# Patient Record
Sex: FEMALE | Race: WHITE | Employment: OTHER | URBAN - METROPOLITAN AREA
[De-identification: names, ages, dates, MRNs, and addresses within clinical notes are randomized per-mention and may not be internally consistent; named-entity substitution may affect disease eponyms.]

---

## 2017-05-01 ENCOUNTER — OFFICE VISIT (OUTPATIENT)
Dept: PODIATRY | Age: 52
End: 2017-05-01
Payer: COMMERCIAL

## 2017-05-01 VITALS
SYSTOLIC BLOOD PRESSURE: 152 MMHG | DIASTOLIC BLOOD PRESSURE: 80 MMHG | WEIGHT: 293 LBS | HEART RATE: 86 BPM | HEIGHT: 63 IN | BODY MASS INDEX: 51.91 KG/M2

## 2017-05-01 DIAGNOSIS — M79.674 PAIN IN TOES OF BOTH FEET: ICD-10-CM

## 2017-05-01 DIAGNOSIS — M79.671 RIGHT FOOT PAIN: ICD-10-CM

## 2017-05-01 DIAGNOSIS — B35.3 TINEA PEDIS OF BOTH FEET: ICD-10-CM

## 2017-05-01 DIAGNOSIS — L85.3 XEROSIS OF SKIN: ICD-10-CM

## 2017-05-01 DIAGNOSIS — M79.675 PAIN IN TOES OF BOTH FEET: ICD-10-CM

## 2017-05-01 DIAGNOSIS — B35.1 ONYCHOMYCOSIS OF TOENAIL: Primary | ICD-10-CM

## 2017-05-01 PROCEDURE — 1036F TOBACCO NON-USER: CPT | Performed by: PODIATRIST

## 2017-05-01 PROCEDURE — G8417 CALC BMI ABV UP PARAM F/U: HCPCS | Performed by: PODIATRIST

## 2017-05-01 PROCEDURE — 3014F SCREEN MAMMO DOC REV: CPT | Performed by: PODIATRIST

## 2017-05-01 PROCEDURE — G8427 DOCREV CUR MEDS BY ELIG CLIN: HCPCS | Performed by: PODIATRIST

## 2017-05-01 PROCEDURE — 99203 OFFICE O/P NEW LOW 30 MIN: CPT | Performed by: PODIATRIST

## 2017-05-01 RX ORDER — CICLOPIROX 7.7 MG/G
GEL TOPICAL
Qty: 1 TUBE | Refills: 0 | Status: SHIPPED | OUTPATIENT
Start: 2017-05-01 | End: 2018-09-13 | Stop reason: ALTCHOICE

## 2017-05-01 RX ORDER — DIAPER,BRIEF,INFANT-TODD,DISP
EACH MISCELLANEOUS
Qty: 1 TUBE | Refills: 1 | Status: SHIPPED | OUTPATIENT
Start: 2017-05-01 | End: 2017-05-08

## 2017-05-01 ASSESSMENT — ENCOUNTER SYMPTOMS
COLOR CHANGE: 0
SHORTNESS OF BREATH: 0
NAUSEA: 0
DIARRHEA: 0
BACK PAIN: 0

## 2017-05-08 ENCOUNTER — CLINICAL DOCUMENTATION (OUTPATIENT)
Dept: PODIATRY | Age: 52
End: 2017-05-08

## 2018-04-23 ENCOUNTER — TELEPHONE (OUTPATIENT)
Dept: BARIATRICS/WEIGHT MGMT | Age: 53
End: 2018-04-23

## 2018-09-13 ENCOUNTER — OFFICE VISIT (OUTPATIENT)
Dept: FAMILY MEDICINE CLINIC | Age: 53
End: 2018-09-13
Payer: COMMERCIAL

## 2018-09-13 VITALS
TEMPERATURE: 97.9 F | DIASTOLIC BLOOD PRESSURE: 90 MMHG | SYSTOLIC BLOOD PRESSURE: 122 MMHG | RESPIRATION RATE: 16 BRPM | OXYGEN SATURATION: 96 % | BODY MASS INDEX: 48.82 KG/M2 | HEIGHT: 65 IN | HEART RATE: 109 BPM | WEIGHT: 293 LBS

## 2018-09-13 DIAGNOSIS — J01.90 ACUTE SINUSITIS, RECURRENCE NOT SPECIFIED, UNSPECIFIED LOCATION: Primary | ICD-10-CM

## 2018-09-13 DIAGNOSIS — J20.9 ACUTE BRONCHITIS, UNSPECIFIED ORGANISM: ICD-10-CM

## 2018-09-13 DIAGNOSIS — H66.90 ACUTE OTITIS MEDIA, UNSPECIFIED OTITIS MEDIA TYPE: ICD-10-CM

## 2018-09-13 DIAGNOSIS — R03.0 ELEVATED BLOOD PRESSURE READING: ICD-10-CM

## 2018-09-13 PROCEDURE — G8417 CALC BMI ABV UP PARAM F/U: HCPCS | Performed by: FAMILY MEDICINE

## 2018-09-13 PROCEDURE — 1036F TOBACCO NON-USER: CPT | Performed by: FAMILY MEDICINE

## 2018-09-13 PROCEDURE — 99213 OFFICE O/P EST LOW 20 MIN: CPT | Performed by: FAMILY MEDICINE

## 2018-09-13 PROCEDURE — 3017F COLORECTAL CA SCREEN DOC REV: CPT | Performed by: FAMILY MEDICINE

## 2018-09-13 PROCEDURE — G8427 DOCREV CUR MEDS BY ELIG CLIN: HCPCS | Performed by: FAMILY MEDICINE

## 2018-09-13 RX ORDER — CLARITHROMYCIN 500 MG/1
500 TABLET, COATED ORAL 2 TIMES DAILY
Qty: 20 TABLET | Refills: 0 | Status: SHIPPED | OUTPATIENT
Start: 2018-09-13 | End: 2018-09-23

## 2018-09-13 RX ORDER — PREDNISONE 20 MG/1
20 TABLET ORAL 2 TIMES DAILY
Qty: 10 TABLET | Refills: 0 | Status: SHIPPED | OUTPATIENT
Start: 2018-09-13 | End: 2018-09-18

## 2018-09-13 ASSESSMENT — ENCOUNTER SYMPTOMS
NAUSEA: 0
SINUS PRESSURE: 1
WHEEZING: 0
HEMOPTYSIS: 1
SORE THROAT: 1
COUGH: 1
DIARRHEA: 0
RHINORRHEA: 1
ABDOMINAL PAIN: 0
VOMITING: 0
BACK PAIN: 0
CHANGE IN BOWEL HABIT: 0
SINUS PAIN: 1
SHORTNESS OF BREATH: 0
CHEST TIGHTNESS: 0

## 2018-09-13 ASSESSMENT — PATIENT HEALTH QUESTIONNAIRE - PHQ9
SUM OF ALL RESPONSES TO PHQ9 QUESTIONS 1 & 2: 0
SUM OF ALL RESPONSES TO PHQ QUESTIONS 1-9: 0
SUM OF ALL RESPONSES TO PHQ QUESTIONS 1-9: 0
1. LITTLE INTEREST OR PLEASURE IN DOING THINGS: 0
2. FEELING DOWN, DEPRESSED OR HOPELESS: 0

## 2018-09-13 NOTE — PROGRESS NOTES
History   Problem Relation Age of Onset    Cancer Mother         thyroid    Diabetes Mother     Anemia Other     Cancer Other     Breast Cancer Other     Diabetes Other     High Blood Pressure Other     Heart Disease Other        Social History   Substance Use Topics    Smoking status: Never Smoker    Smokeless tobacco: Never Used    Alcohol use No      Current Outpatient Prescriptions   Medication Sig Dispense Refill    clarithromycin (BIAXIN) 500 MG tablet Take 1 tablet by mouth 2 times daily for 10 days 20 tablet 0    predniSONE (DELTASONE) 20 MG tablet Take 1 tablet by mouth 2 times daily for 5 days Take one tablet twice a day with food for 5 days 10 tablet 0     No current facility-administered medications for this visit. Allergies   Allergen Reactions    Latex Itching and Rash    Penicillins Anaphylaxis and Other (See Comments)     Passes out, all penicillins        Health Maintenance   Topic Date Due    HIV screen  11/04/1981    DTaP/Tdap/Td vaccine (1 - Tdap) 11/04/1985    Cervical cancer screen  11/04/1987    Diabetes screen  11/04/2006    Lipid screen  01/08/2016    Breast cancer screen  11/04/2016    Shingles Vaccine (1 of 2 - 2 Dose Series) 11/04/2016    Colon cancer screen colonoscopy  11/04/2016    Flu vaccine (1) 09/01/2018       Subjective:      Review of Systems   Constitutional: Positive for appetite change, diaphoresis, fatigue and fever. Negative for chills. HENT: Positive for congestion, ear pain, postnasal drip, rhinorrhea, sinus pain, sinus pressure and sore throat. Negative for sneezing. Eyes:        Eyes water   Respiratory: Positive for cough and hemoptysis. Negative for chest tightness, shortness of breath and wheezing. Cardiovascular: Negative for chest pain. Gastrointestinal: Negative for abdominal pain, change in bowel habit, diarrhea, nausea and vomiting. Genitourinary: Negative. Musculoskeletal: Negative for back pain and neck pain.    Skin: Wt (!) 425 lb (192.8 kg)   SpO2 96%   BMI 70.72 kg/m²     Assessment:       Diagnosis Orders   1. Acute sinusitis, recurrence not specified, unspecified location  clarithromycin (BIAXIN) 500 MG tablet    predniSONE (DELTASONE) 20 MG tablet   2. Acute bronchitis, unspecified organism  clarithromycin (BIAXIN) 500 MG tablet    predniSONE (DELTASONE) 20 MG tablet   3. Acute otitis media, unspecified otitis media type  clarithromycin (BIAXIN) 500 MG tablet   4. Elevated blood pressure reading         Plan:      Patient advised to have her BP monitored. No orders of the defined types were placed in this encounter. Orders Placed This Encounter   Medications    clarithromycin (BIAXIN) 500 MG tablet     Sig: Take 1 tablet by mouth 2 times daily for 10 days     Dispense:  20 tablet     Refill:  0    predniSONE (DELTASONE) 20 MG tablet     Sig: Take 1 tablet by mouth 2 times daily for 5 days Take one tablet twice a day with food for 5 days     Dispense:  10 tablet     Refill:  0       Patient given educational materials - see patient instructions. Discussed use, benefit, and side effects of prescribed medications. All patient questions answered. Pt voiced understanding. Reviewed health maintenance. Instructed to continue current medications, diet and exercise. Patient agreed with treatment plan. Follow up as directed.      Electronically signed by Royal Fam MD on 9/13/2018 at 7:59 PM

## 2018-09-14 NOTE — PATIENT INSTRUCTIONS
Patient Education        Bronchitis: Care Instructions  Your Care Instructions    Bronchitis is inflammation of the bronchial tubes, which carry air to the lungs. The tubes swell and produce mucus, or phlegm. The mucus and inflamed bronchial tubes make you cough. You may have trouble breathing. Most cases of bronchitis are caused by viruses like those that cause colds. Antibiotics usually do not help and they may be harmful. Bronchitis usually develops rapidly and lasts about 2 to 3 weeks in otherwise healthy people. Follow-up care is a key part of your treatment and safety. Be sure to make and go to all appointments, and call your doctor if you are having problems. It's also a good idea to know your test results and keep a list of the medicines you take. How can you care for yourself at home? · Take all medicines exactly as prescribed. Call your doctor if you think you are having a problem with your medicine. · Get some extra rest.  · Take an over-the-counter pain medicine, such as acetaminophen (Tylenol), ibuprofen (Advil, Motrin), or naproxen (Aleve) to reduce fever and relieve body aches. Read and follow all instructions on the label. · Do not take two or more pain medicines at the same time unless the doctor told you to. Many pain medicines have acetaminophen, which is Tylenol. Too much acetaminophen (Tylenol) can be harmful. · Take an over-the-counter cough medicine that contains dextromethorphan to help quiet a dry, hacking cough so that you can sleep. Avoid cough medicines that have more than one active ingredient. Read and follow all instructions on the label. · Breathe moist air from a humidifier, hot shower, or sink filled with hot water. The heat and moisture will thin mucus so you can cough it out. · Do not smoke. Smoking can make bronchitis worse. If you need help quitting, talk to your doctor about stop-smoking programs and medicines.  These can increase your chances of quitting for good.  When should you call for help? Call 911 anytime you think you may need emergency care. For example, call if:    · You have severe trouble breathing.    Call your doctor now or seek immediate medical care if:    · You have new or worse trouble breathing.     · You cough up dark brown or bloody mucus (sputum).     · You have a new or higher fever.     · You have a new rash.    Watch closely for changes in your health, and be sure to contact your doctor if:    · You cough more deeply or more often, especially if you notice more mucus or a change in the color of your mucus.     · You are not getting better as expected. Where can you learn more? Go to https://Orbital Traction.Diagnostic Hybrids. org and sign in to your [employer] account. Enter H333 in the Rec[employer] box to learn more about \"Bronchitis: Care Instructions. \"     If you do not have an account, please click on the \"Sign Up Now\" link. Current as of: December 6, 2017  Content Version: 11.7  © 0181-3312 Smeet. Care instructions adapted under license by [facility] ([facility]). If you have questions about a medical condition or this instruction, always ask your healthcare professional. [place] [address] any warranty or liability for your use of this information. Patient Education        Elevated Blood Pressure: Care Instructions  Your Care Instructions    Blood pressure is a measure of how hard the blood pushes against the walls of your arteries. It's normal for blood pressure to go up and down throughout the day. But if it stays up over time, you have high blood pressure. Two numbers tell you your blood pressure. The first number is the systolic pressure. It shows how hard the blood pushes when your heart is pumping. The second number is the diastolic pressure. It shows how hard the blood pushes between heartbeats, when your heart is relaxed and filling with blood.  An ideal blood pressure in adults is less than license by [facility] ([facility]). If you have questions about a medical condition or this instruction, always ask your healthcare professional. [place] [address] any warranty or liability for your use of this information. Patient Education        Ear Infection (Otitis Media): Care Instructions  Your Care Instructions    An ear infection may start with a cold and affect the middle ear (otitis media). It can hurt a lot. Most ear infections clear up on their own in a couple of days. Most often you will not need antibiotics. This is because many ear infections are caused by a virus. Antibiotics don't work against a virus. Regular doses of pain medicines are the best way to reduce your fever and help you feel better. Follow-up care is a key part of your treatment and safety. Be sure to make and go to all appointments, and call your doctor if you are having problems. It's also a good idea to know your test results and keep a list of the medicines you take. How can you care for yourself at home? · Take pain medicines exactly as directed. ¨ If the doctor gave you a prescription medicine for pain, take it as prescribed. ¨ If you are not taking a prescription pain medicine, take an over-the-counter medicine, such as acetaminophen (Tylenol), ibuprofen (Advil, Motrin), or naproxen (Aleve). Read and follow all instructions on the label. ¨ Do not take two or more pain medicines at the same time unless the doctor told you to. Many pain medicines have acetaminophen, which is Tylenol. Too much acetaminophen (Tylenol) can be harmful. · Plan to take a full dose of pain reliever before bedtime. Getting enough sleep will help you get better. · Try a warm, moist washcloth on the ear. It may help relieve pain. · If your doctor prescribed antibiotics, take them as directed. Do not stop taking them just because you feel better. You need to take the full course of antibiotics. When should you call for help?   Call your doctor now or seek immediate medical care if:    · You have new or increasing ear pain.     · You have new or increasing pus or blood draining from your ear.     · You have a fever with a stiff neck or a severe headache.    Watch closely for changes in your health, and be sure to contact your doctor if:    · You have new or worse symptoms.     · You are not getting better after taking an antibiotic for 2 days. Where can you learn more? Go to https://Ocscpepiceweb.Inventorum. org and sign in to your [employer] account. Enter Z175 in the SimplyGiving.com box to learn more about \"Ear Infection (Otitis Media): Care Instructions. \"     If you do not have an account, please click on the \"Sign Up Now\" link. Current as of: May 12, 2017  Content Version: 11.7  © 5435-8417 Patient Feed, [employer]. Care instructions adapted under license by [facility] ([facility]). If you have questions about a medical condition or this instruction, always ask your healthcare professional. [place] [address] any warranty or liability for your use of this information. Patient Education        Saline Nasal Washes: Care Instructions  Your Care Instructions  Saline nasal washes help keep the nasal passages open by washing out thick or dried mucus. This simple remedy can help relieve symptoms of allergies, sinusitis, and colds. It also can make the nose feel more comfortable by keeping the mucous membranes moist. You may notice a little burning sensation in your nose the first few times you use the solution, but this usually gets better in a few days. Follow-up care is a key part of your treatment and safety. Be sure to make and go to all appointments, and call your doctor if you are having problems. It's also a good idea to know your test results and keep a list of the medicines you take. How can you care for yourself at home?   · You can buy premixed saline solution in a squeeze bottle or other sinus rinse products at a drugstore. Read and follow the instructions on the label. · You also can make your own saline solution by adding 1 teaspoon of salt and 1 teaspoon of baking soda to 2 cups of distilled water. · If you use a homemade solution, pour a small amount into a clean bowl. Using a rubber bulb syringe, squeeze the syringe and place the tip in the salt water. Pull a small amount of the salt water into the syringe by relaxing your hand. · Sit down with your head tilted slightly back. Do not lie down. Put the tip of the bulb syringe or the squeeze bottle a little way into one of your nostrils. Gently drip or squirt a few drops into the nostril. Repeat with the other nostril. Some sneezing and gagging are normal at first.  · Gently blow your nose. · Wipe the syringe or bottle tip clean after each use. · Repeat this 2 or 3 times a day. · Use nasal washes gently if you have nosebleeds often. When should you call for help? Watch closely for changes in your health, and be sure to contact your doctor if:    · You often get nosebleeds.     · You have problems doing the nasal washes. Where can you learn more? Go to https://Zhengtai Datape3D Control Systemseb.Zimory. org and sign in to your Squid Facil account. Enter [id number] [phone] in the [facility] box to learn more about \"Saline Nasal Washes: Care Instructions. \"     If you do not have an account, please click on the \"Sign Up Now\" link. Current as of: May 12, 2017  Content Version: 11.7  © 6200-8963 Godigex[employer]. Care instructions adapted under license by [facility] [employer] [facility] ([facility]). If you have questions about a medical condition or this instruction, always ask your healthcare professional. [place] [address] any warranty or liability for your use of this information. Patient Education        DASH Diet: Care Instructions  Your Care Instructions    The DASH diet is an eating plan that can help lower your blood pressure.  DASH stands for Dietary Approaches to Stop to 2 servings each day. A serving is 3 ounces, about the size of a deck of cards. · Eat 4 to 5 servings of nuts, seeds, and legumes (cooked dried beans, lentils, and split peas) each week. A serving is 1/3 cup of nuts, 2 tablespoons of seeds, or ½ cup of cooked beans or peas. · Limit fats and oils to 2 to 3 servings each day. A serving is 1 teaspoon of vegetable oil or 2 tablespoons of salad dressing. · Limit sweets and added sugars to 5 servings or less a week. A serving is 1 tablespoon jelly or jam, ½ cup sorbet, or 1 cup of lemonade. · Eat less than 2,300 milligrams (mg) of sodium a day. If you limit your sodium to 1,500 mg a day, you can lower your blood pressure even more. Tips for success  · Start small. Do not try to make dramatic changes to your diet all at once. You might feel that you are missing out on your favorite foods and then be more likely to not follow the plan. Make small changes, and stick with them. Once those changes become habit, add a few more changes. · Try some of the following:  ¨ Make it a goal to eat a fruit or vegetable at every meal and at snacks. This will make it easy to get the recommended amount of fruits and vegetables each day. ¨ Try yogurt topped with fruit and nuts for a snack or healthy dessert. ¨ Add lettuce, tomato, cucumber, and onion to sandwiches. ¨ Combine a ready-made pizza crust with low-fat mozzarella cheese and lots of vegetable toppings. Try using tomatoes, squash, spinach, broccoli, carrots, cauliflower, and onions. ¨ Have a variety of cut-up vegetables with a low-fat dip as an appetizer instead of chips and dip. ¨ Sprinkle sunflower seeds or chopped almonds over salads. Or try adding chopped walnuts or almonds to cooked vegetables. ¨ Try some vegetarian meals using beans and peas. Add garbanzo or kidney beans to salads. Make burritos and tacos with mashed lopez beans or black beans. Where can you learn more?   Go to https://chpepiceweb.Matches Fashion. org and sign in to your DIGIONE Companyhart account. Enter E795 in the EZ LIFT [employer] System[employer] box to learn more about \"DASH Diet: Care Instructions. \"     If you do not have an account, please click on the \"Sign Up Now\" link. Current as of: December 6, 2017  Content Version: 11.7  © 6041-6746 Pikum, Healthonomy. Care instructions adapted under license by [address] If you have questions about a medical condition or this instruction, always ask your healthcare professional. [address] any warranty or liability for your use of this information.      Please have your BP monitored

## 2018-09-19 ENCOUNTER — HOSPITAL ENCOUNTER (OUTPATIENT)
Age: 53
Discharge: HOME | End: 2018-09-19
Payer: COMMERCIAL

## 2018-09-19 DIAGNOSIS — M25.50 ARTHRALGIA, UNSPECIFIED JOINT: ICD-10-CM

## 2018-09-19 DIAGNOSIS — R63.5 WEIGHT GAIN: ICD-10-CM

## 2018-09-19 LAB
ANION GAP SERPL CALCULATED.3IONS-SCNC: 18 MMOL/L (ref 9–17)
BUN BLDV-MCNC: 15 MG/DL (ref 6–20)
BUN/CREAT BLD: ABNORMAL (ref 9–20)
CALCIUM SERPL-MCNC: 9.7 MG/DL (ref 8.6–10.4)
CHLORIDE BLD-SCNC: 90 MMOL/L (ref 98–107)
CO2: 24 MMOL/L (ref 20–31)
CREAT SERPL-MCNC: 0.61 MG/DL (ref 0.5–0.9)
GFR AFRICAN AMERICAN: >60 ML/MIN
GFR NON-AFRICAN AMERICAN: >60 ML/MIN
GFR SERPL CREATININE-BSD FRML MDRD: ABNORMAL ML/MIN/{1.73_M2}
GFR SERPL CREATININE-BSD FRML MDRD: ABNORMAL ML/MIN/{1.73_M2}
GLUCOSE BLD-MCNC: 294 MG/DL (ref 70–99)
HCT VFR BLD CALC: 45 % (ref 36–46)
HEMOGLOBIN: 15.4 G/DL (ref 12–16)
INSULIN COMMENT: NORMAL
INSULIN REFERENCE RANGE:: NORMAL
INSULIN: 26.7 MU/L
MCH RBC QN AUTO: 31.2 PG (ref 26–34)
MCHC RBC AUTO-ENTMCNC: 34.2 G/DL (ref 31–37)
MCV RBC AUTO: 91.1 FL (ref 80–100)
NRBC AUTOMATED: NORMAL PER 100 WBC
PDW BLD-RTO: 13 % (ref 11.5–14.9)
PLATELET # BLD: 283 K/UL (ref 150–450)
PMV BLD AUTO: 8.5 FL (ref 6–12)
POTASSIUM SERPL-SCNC: 3.5 MMOL/L (ref 3.7–5.3)
RBC # BLD: 4.93 M/UL (ref 4–5.2)
SODIUM BLD-SCNC: 132 MMOL/L (ref 135–144)
TSH SERPL DL<=0.05 MIU/L-ACNC: 1.32 MIU/L (ref 0.3–5)
WBC # BLD: 8.4 K/UL (ref 3.5–11)

## 2018-09-19 PROCEDURE — 80048 BASIC METABOLIC PNL TOTAL CA: CPT

## 2018-09-19 PROCEDURE — 36415 COLL VENOUS BLD VENIPUNCTURE: CPT

## 2018-09-19 PROCEDURE — 84443 ASSAY THYROID STIM HORMONE: CPT

## 2018-09-19 PROCEDURE — 83525 ASSAY OF INSULIN: CPT

## 2018-09-19 PROCEDURE — 85027 COMPLETE CBC AUTOMATED: CPT

## 2018-09-20 PROBLEM — E11.9 NEW ONSET TYPE 2 DIABETES MELLITUS (HCC): Chronic | Status: ACTIVE | Noted: 2018-09-20

## 2018-09-26 PROBLEM — E66.9 ADULT-ONSET OBESITY: Status: ACTIVE | Noted: 2018-09-26

## 2018-09-27 PROBLEM — Z79.4 TYPE 2 DIABETES MELLITUS WITHOUT COMPLICATION, WITH LONG-TERM CURRENT USE OF INSULIN (HCC): Status: ACTIVE | Noted: 2018-09-27

## 2018-09-27 PROBLEM — E11.9 TYPE 2 DIABETES MELLITUS WITHOUT COMPLICATION, WITH LONG-TERM CURRENT USE OF INSULIN (HCC): Status: ACTIVE | Noted: 2018-09-27

## 2018-10-01 ENCOUNTER — HOSPITAL ENCOUNTER (OUTPATIENT)
Dept: DIABETES SERVICES | Age: 53
Setting detail: THERAPIES SERIES
Discharge: HOME | End: 2018-10-01
Payer: COMMERCIAL

## 2018-10-01 VITALS — HEIGHT: 63 IN | BODY MASS INDEX: 51.91 KG/M2 | WEIGHT: 293 LBS

## 2018-10-01 PROCEDURE — G0108 DIAB MANAGE TRN  PER INDIV: HCPCS

## 2018-10-01 NOTE — LETTER
[facility]  [address] [address]  Phone: [phone]         October 1, 2018    To Betty Manrique[address], [facility]    From: Marina Pappas RN    Patient: Umu Buitrago   YOB: 1965   Date of Visit: 10/1/18     An initial assessment to determine diabetes education needs was completed on 10/1/18. Education plan includes :referred to Diabetes Educator, interpretation of lab results, blood sugar goals, complications of diabetes mellitus, hypoglycemia prevention and treatment, exercise, illness management, self-monitoring of blood glucose skills, nutrition, carbohydrate counting and site rotation. An ongoing plan was created to include: follow up individual    Thank you for the opportunity to provide Diabetes Self Management Education to your patient.

## 2018-10-01 NOTE — PROGRESS NOTES
appointment  after      []ADA  Where do I Begin, Living with Type 2 diabetes ADA home support program     [] Healthy U - Diabetes workshops via [facility] ([facility]) link       [] Starting [employer] program /  [employer] [phone]    []  Support Group / Brian  6 week diabetes education support   classes Naif Bekah 24 [phone]      []   [employer] application  / scholarship program     []ADA care 4 life trent      []  Internet web sites - ADA and D- life    Post Education Referrals:      [] [facility] information sheet and [address] [facility] , [address]       [] Dental care - Dental care of [employer]     [] [facility] ([facility]) link  phone number - for information and referral to [facility]  Clinically  [address] [facility], WEIGHT MANAGEMENT        []Rosa Wilder RN

## 2018-10-30 ENCOUNTER — HOSPITAL ENCOUNTER (OUTPATIENT)
Dept: DIABETES SERVICES | Age: 53
Setting detail: THERAPIES SERIES
Discharge: HOME | End: 2018-10-30
Payer: COMMERCIAL

## 2018-10-30 PROCEDURE — G0108 DIAB MANAGE TRN  PER INDIV: HCPCS

## 2018-12-17 DIAGNOSIS — E11.9 TYPE 2 DIABETES MELLITUS WITHOUT COMPLICATION, WITHOUT LONG-TERM CURRENT USE OF INSULIN (HCC): ICD-10-CM

## 2018-12-17 DIAGNOSIS — E11.9 NEW ONSET TYPE 2 DIABETES MELLITUS (HCC): Chronic | ICD-10-CM

## 2018-12-17 DIAGNOSIS — E11.9 TYPE 2 DIABETES MELLITUS WITHOUT COMPLICATION, WITH LONG-TERM CURRENT USE OF INSULIN (HCC): ICD-10-CM

## 2018-12-17 DIAGNOSIS — M25.50 ARTHRALGIA, UNSPECIFIED JOINT: ICD-10-CM

## 2018-12-17 DIAGNOSIS — I10 ESSENTIAL HYPERTENSION: ICD-10-CM

## 2018-12-17 DIAGNOSIS — Z79.4 TYPE 2 DIABETES MELLITUS WITHOUT COMPLICATION, WITH LONG-TERM CURRENT USE OF INSULIN (HCC): ICD-10-CM

## 2018-12-17 RX ORDER — MELOXICAM 15 MG/1
15 TABLET ORAL DAILY
Qty: 90 TABLET | Refills: 3 | Status: SHIPPED | OUTPATIENT
Start: 2018-12-17 | End: 2020-05-26 | Stop reason: ALTCHOICE

## 2018-12-17 RX ORDER — TRIAMTERENE AND HYDROCHLOROTHIAZIDE 75; 50 MG/1; MG/1
1 TABLET ORAL DAILY
Qty: 90 TABLET | Refills: 3 | Status: SHIPPED | OUTPATIENT
Start: 2018-12-17 | End: 2019-11-15 | Stop reason: SDUPTHER

## 2018-12-20 DIAGNOSIS — E11.9 TYPE 2 DIABETES MELLITUS WITHOUT COMPLICATION, WITH LONG-TERM CURRENT USE OF INSULIN (HCC): ICD-10-CM

## 2018-12-20 DIAGNOSIS — E11.9 NEW ONSET TYPE 2 DIABETES MELLITUS (HCC): Chronic | ICD-10-CM

## 2018-12-20 DIAGNOSIS — M25.50 ARTHRALGIA, UNSPECIFIED JOINT: ICD-10-CM

## 2018-12-20 DIAGNOSIS — E11.9 TYPE 2 DIABETES MELLITUS WITHOUT COMPLICATION, WITHOUT LONG-TERM CURRENT USE OF INSULIN (HCC): ICD-10-CM

## 2018-12-20 DIAGNOSIS — Z79.4 TYPE 2 DIABETES MELLITUS WITHOUT COMPLICATION, WITH LONG-TERM CURRENT USE OF INSULIN (HCC): ICD-10-CM

## 2018-12-20 DIAGNOSIS — I10 ESSENTIAL HYPERTENSION: ICD-10-CM

## 2018-12-20 RX ORDER — MELOXICAM 15 MG/1
15 TABLET ORAL DAILY
Qty: 30 TABLET | Refills: 3 | Status: CANCELLED | OUTPATIENT
Start: 2018-12-20

## 2018-12-20 RX ORDER — TRIAMTERENE AND HYDROCHLOROTHIAZIDE 75; 50 MG/1; MG/1
1 TABLET ORAL DAILY
Qty: 30 TABLET | Refills: 3 | Status: CANCELLED | OUTPATIENT
Start: 2018-12-20

## 2018-12-20 RX ORDER — LANCETS 30 GAUGE
EACH MISCELLANEOUS
Qty: 100 EACH | Refills: 11 | Status: SHIPPED | OUTPATIENT
Start: 2018-12-20 | End: 2019-11-15 | Stop reason: SDUPTHER

## 2019-01-29 ENCOUNTER — OFFICE VISIT (OUTPATIENT)
Dept: PRIMARY CARE CLINIC | Age: 54
End: 2019-01-29
Payer: COMMERCIAL

## 2019-01-29 VITALS
BODY MASS INDEX: 72.2 KG/M2 | SYSTOLIC BLOOD PRESSURE: 124 MMHG | WEIGHT: 293 LBS | DIASTOLIC BLOOD PRESSURE: 82 MMHG | HEART RATE: 108 BPM | OXYGEN SATURATION: 97 %

## 2019-01-29 DIAGNOSIS — R09.81 NASAL CONGESTION: ICD-10-CM

## 2019-01-29 DIAGNOSIS — E11.9 TYPE 2 DIABETES MELLITUS WITHOUT COMPLICATION, WITH LONG-TERM CURRENT USE OF INSULIN (HCC): Primary | ICD-10-CM

## 2019-01-29 DIAGNOSIS — R51.9 SINUS HEADACHE: ICD-10-CM

## 2019-01-29 DIAGNOSIS — Z79.4 TYPE 2 DIABETES MELLITUS WITHOUT COMPLICATION, WITH LONG-TERM CURRENT USE OF INSULIN (HCC): Primary | ICD-10-CM

## 2019-01-29 DIAGNOSIS — E11.9 NEW ONSET TYPE 2 DIABETES MELLITUS (HCC): Chronic | ICD-10-CM

## 2019-01-29 LAB — HBA1C MFR BLD: 8.8 %

## 2019-01-29 PROCEDURE — 83036 HEMOGLOBIN GLYCOSYLATED A1C: CPT | Performed by: FAMILY MEDICINE

## 2019-01-29 PROCEDURE — 99214 OFFICE O/P EST MOD 30 MIN: CPT | Performed by: FAMILY MEDICINE

## 2019-01-29 ASSESSMENT — ENCOUNTER SYMPTOMS: ROS SKIN COMMENTS: DRY SKIN

## 2019-01-31 LAB
CREATINE, URINE: 111.3 MG/DL (ref 28–217)
MICROALBUMIN/CREAT 24H UR: 2.5 MG/DL (ref 1.2–40)
MICROALBUMIN/CREAT UR-RTO: 22 MG/G

## 2019-04-16 ENCOUNTER — OFFICE VISIT (OUTPATIENT)
Dept: PRIMARY CARE CLINIC | Age: 54
End: 2019-04-16
Payer: COMMERCIAL

## 2019-04-16 ENCOUNTER — PROCEDURE VISIT (OUTPATIENT)
Dept: PRIMARY CARE CLINIC | Age: 54
End: 2019-04-16
Payer: COMMERCIAL

## 2019-04-16 VITALS
HEART RATE: 103 BPM | DIASTOLIC BLOOD PRESSURE: 92 MMHG | OXYGEN SATURATION: 96 % | SYSTOLIC BLOOD PRESSURE: 116 MMHG | WEIGHT: 293 LBS | BODY MASS INDEX: 71.67 KG/M2

## 2019-04-16 DIAGNOSIS — G47.9 SLEEP DISORDER: Primary | ICD-10-CM

## 2019-04-16 PROCEDURE — 99213 OFFICE O/P EST LOW 20 MIN: CPT | Performed by: FAMILY MEDICINE

## 2019-04-16 NOTE — PATIENT INSTRUCTIONS
Patient Education        Learning About CPAP for Sleep Apnea  What is CPAP? CPAP is a small machine that you use at home every night while you sleep. It increases air pressure in your throat to keep your airway open. When you have sleep apnea, this can help you sleep better so you feel much better. CPAP stands for \"continuous positive airway pressure. \"  The CPAP machine will have one of the following:  · A mask that covers your nose and mouth  · Prongs that fit into your nose  · A mask that covers your nose only, the most common type. This type is called NCPAP. The N stands for \"nasal.\"  Why is it done? CPAP is usually the best treatment for obstructive sleep apnea. It is the first treatment choice and the most widely used. Your doctor may suggest CPAP if you have:  · Moderate to severe sleep apnea. · Sleep apnea and coronary artery disease (CAD). · Sleep apnea and heart failure. How does it help? · CPAP can help you have more normal sleep, so you feel less sleepy and more alert during the daytime. · CPAP may help keep heart failure or other heart problems from getting worse. · CPAP may help lower your blood pressure. · If you use CPAP, your bed partner may also sleep better because you are not snoring or restless. What are the side effects? Some people who use CPAP have:  · A dry or stuffy nose and a sore throat. · Irritated skin on the face. · Sore eyes. · Bloating. If you have any of these problems, work with your doctor to fix them. Here are some things you can try:  · Be sure the mask or nasal prongs fit well. · See if your doctor can adjust the pressure of your CPAP. · If your nose is dry, try a humidifier. · If your nose is runny or stuffy, try decongestant medicine or a steroid nasal spray. Be safe with medicines. Read and follow all instructions on the label. Do not use the medicine longer than the label says. If these things do not help, you might try a different type of machine. Some machines have air pressure that adjusts on its own. Others have air pressures that are different when you breathe in than when you breathe out. This may reduce discomfort caused by too much pressure in your nose. Where can you learn more? Go to https://chlala.bCODE. org and sign in to your Testlio account. Enter L245 in the BRAINDIGIT box to learn more about \"Learning About CPAP for Sleep Apnea. \"     If you do not have an account, please click on the \"Sign Up Now\" link. Current as of: September 5, 2018  Content Version: 11.9  © 8822-8691 [email or web address]. Care instructions adapted under license by [facility] ([facility]). If you have questions about a medical condition or this instruction, always ask your healthcare professional. [address] any warranty or liability for your use of this information. Patient Education        Sleep Apnea: Care Instructions  Your Care Instructions    Sleep apnea means that you frequently stop breathing for 10 seconds or longer during sleep. It can be mild to severe, based on the number of times an hour that you stop breathing or have slowed breathing. Blocked or narrowed airways in your nose, mouth, or throat can cause sleep apnea. Your airway can become blocked when your throat muscles and tongue relax during sleep. You can treat sleep apnea at home by making lifestyle changes. You also can use a CPAP breathing machine that keeps tissues in the throat from blocking your airway. Or your doctor may suggest that you use a breathing device while you sleep. It helps keep your airway open. This could be a device that you put in your mouth. In some cases, surgery may be needed to remove enlarged tissues in the throat. Follow-up care is a key part of your treatment and safety. Be sure to make and go to all appointments, and call your doctor if you are having problems.  It's also a good idea to know your test results and keep a list of the medicines you take. How can you care for yourself at home? · Lose weight, if needed. It may reduce the number of times you stop breathing or have slowed breathing. · Sleep on your side. It may stop mild apnea. If you tend to roll onto your back, sew a pocket in the back of your pajama top. Put a tennis ball into the pocket, and stitch the pocket shut. This will help keep you from sleeping on your back. · Avoid alcohol and medicines such as sleeping pills and sedatives before bed. · Do not smoke. Smoking can make sleep apnea worse. If you need help quitting, talk to your doctor about stop-smoking programs and medicines. These can increase your chances of quitting for good. · Prop up the head of your bed 4 to 6 inches by putting bricks under the legs of the bed. · Treat breathing problems, such as a stuffy nose, caused by a cold or allergies. · Try a continuous positive airway pressure (CPAP) breathing machine if your doctor recommends it. The machine keeps your airway open when you sleep. · If CPAP does not work for you, ask your doctor if you can try other breathing machines. A bilevel positive airway pressure machine uses one type of air pressure for breathing in and another type for breathing out. Another device raises or lowers air pressure as needed while you breathe. · Talk to your doctor if:  ? Your nose feels dry or bleeds when you use one of these machines. You may need to increase moisture in the air. A humidifier may help. ? Your nose is runny or stuffy from using a breathing machine. Decongestants or a corticosteroid nasal spray may help. ? You are sleepy during the day and it gets in the way of the normal things you do. Do not drive when you are drowsy. When should you call for help?   Watch closely for changes in your health, and be sure to contact your doctor if:    · You still have sleep apnea even though you have made lifestyle changes.     · You are thinking of trying a device such as CPAP.     · You are having problems using a CPAP or similar machine. Where can you learn more? Go to https://[email or web address] and sign in to your Xceive account. Enter N949 in the Power.com box to learn more about \"Sleep Apnea: Care Instructions. \"     If you do not have an account, please click on the \"Sign Up Now\" link. Current as of: September 5, 2018  Content Version: 11.9  © 7704-6179 RateSetter, Incorporated. Care instructions adapted under license by [facility] ([facility]). If you have questions about a medical condition or this instruction, always ask your healthcare professional. [address] any warranty or liability for your use of this information.

## 2019-04-16 NOTE — PROGRESS NOTES
Nasal route 4 times daily  0    Lancets MISC Test BID and on insulin 100 each 11    blood glucose test strips (ASCENSIA AUTODISC VI;ONE TOUCH ULTRA TEST VI) strip Test BID  On insulin, type 2  strip 11    Insulin Pen Needle (B-D ULTRAFINE III SHORT PEN) 31G X 8 MM MISC Inject 1 each into the skin daily May substitute with any brand 100 each 11    triamterene-hydrochlorothiazide (MAXZIDE) 75-50 MG per tablet Take 1 tablet by mouth daily 90 tablet 3    metFORMIN (GLUCOPHAGE) 500 MG tablet Take 1 tablet by mouth 2 times daily (with meals) 180 tablet 3    meloxicam (MOBIC) 15 MG tablet Take 1 tablet by mouth daily 90 tablet 3     No current facility-administered medications for this visit. Allergies   Allergen Reactions    Latex Itching and Rash    Penicillins Anaphylaxis and Other (See Comments)     Passes out, all penicillins        Health Maintenance   Topic Date Due    Diabetic foot exam  11/04/1976    Diabetic retinal exam  11/04/1976    HIV screen  11/04/1981    Hepatitis B Vaccine (1 of 3 - Risk 3-dose series) 11/04/1985    DTaP/Tdap/Td vaccine (1 - Tdap) 11/04/1985    Lipid screen  01/08/2012    Breast cancer screen  11/04/2016    Shingles Vaccine (1 of 2) 11/04/2016    Colon cancer screen colonoscopy  11/04/2016    Potassium monitoring  09/19/2019    Creatinine monitoring  09/19/2019    A1C test (Diabetic or Prediabetic)  01/29/2020    Diabetic microalbuminuria test  01/29/2020    Flu vaccine  Completed    Pneumococcal 0-64 years Vaccine  Completed       Subjective:      Review of Systems   Constitutional: Positive for fatigue. works as  now      Objective:     BP (!) 116/92   Pulse 103   Wt (!) 404 lb 9.6 oz (183.5 kg)   SpO2 96%   BMI 71.67 kg/m²   Physical Exam   Constitutional: She is oriented to person, place, and time. She appears well-developed and well-nourished. No distress. HENT:   Head: Normocephalic and atraumatic.    Right Ear: External ear normal. Left Ear: External ear normal.   Mouth/Throat: Oropharynx is clear and moist.   Small pharyngeal opening   Eyes: Conjunctivae are normal. Right eye exhibits no discharge. Left eye exhibits no discharge. No scleral icterus. Neck: No tracheal deviation present. No thyromegaly present. Neck circumference 21 inches   Cardiovascular: Normal rate, regular rhythm and normal heart sounds. No carotid bruits   Pulmonary/Chest: Effort normal and breath sounds normal. No respiratory distress. She has no wheezes. Musculoskeletal: She exhibits no edema. Lymphadenopathy:     She has no cervical adenopathy. Neurological: She is alert and oriented to person, place, and time. Skin: Skin is warm. No rash noted. Psychiatric: She has a normal mood and affect. Her behavior is normal. Thought content normal.   Nursing note and vitals reviewed. Assessment:       Diagnosis Orders   1. Sleep disorder  Home Sleep Study   2. BMI 70 and over, adult [facility])  Home Sleep Study        Plan:      Return in about 3 weeks (around 5/7/2019) for diabetes mellitus. Reviewed her diet and sugars  Sleep apnea questionnaires. Orders Placed This Encounter   Procedures    Home Sleep Study     Standing Status:   Future     Standing Expiration Date:   10/16/2019     Order Specific Question:   Location For Sleep Study     Answer: [facility] Specific Question:   Select Sleep Lab Location     Answer:   [facility]     No orders of the defined types were placed in this encounter. Patient given educational materials - see patient instructions. Discussed use, benefit, and side effects of prescribed medications. All patient questions answered. Pt voiced understanding. Instructed to continue current medications, diet and exercise. Patient agreed with treatment plan. Follow up as directed.      Electronicallysigned by Johannah Scheuermann, MD on 4/16/2019 at 10:34 AM

## 2019-04-17 PROCEDURE — 95806 SLEEP STUDY UNATT&RESP EFFT: CPT | Performed by: FAMILY MEDICINE

## 2019-04-25 DIAGNOSIS — G47.9 SLEEP DISORDER: ICD-10-CM

## 2019-04-25 DIAGNOSIS — G47.34 NOCTURNAL HYPOXIA: ICD-10-CM

## 2019-04-25 DIAGNOSIS — G47.9 SLEEP DISORDER: Primary | ICD-10-CM

## 2019-04-25 DIAGNOSIS — G47.33 OSA (OBSTRUCTIVE SLEEP APNEA): ICD-10-CM

## 2019-04-26 DIAGNOSIS — E11.9 TYPE 2 DIABETES MELLITUS WITHOUT COMPLICATION, WITH LONG-TERM CURRENT USE OF INSULIN (HCC): ICD-10-CM

## 2019-04-26 DIAGNOSIS — Z79.4 TYPE 2 DIABETES MELLITUS WITHOUT COMPLICATION, WITH LONG-TERM CURRENT USE OF INSULIN (HCC): ICD-10-CM

## 2019-04-26 DIAGNOSIS — E11.9 NEW ONSET TYPE 2 DIABETES MELLITUS (HCC): Chronic | ICD-10-CM

## 2019-04-26 RX ORDER — INSULIN DETEMIR 100 [IU]/ML
INJECTION, SOLUTION SUBCUTANEOUS
Qty: 45 ML | Refills: 0 | Status: SHIPPED | OUTPATIENT
Start: 2019-04-26 | End: 2019-05-24 | Stop reason: SDUPTHER

## 2019-05-06 ENCOUNTER — HOSPITAL ENCOUNTER (OUTPATIENT)
Dept: SLEEP CENTER | Age: 54
Discharge: HOME | End: 2019-05-08
Payer: COMMERCIAL

## 2019-05-06 DIAGNOSIS — G47.33 OSA (OBSTRUCTIVE SLEEP APNEA): Primary | Chronic | ICD-10-CM

## 2019-05-06 PROCEDURE — 95811 POLYSOM 6/>YRS CPAP 4/> PARM: CPT

## 2019-05-07 ENCOUNTER — TELEPHONE (OUTPATIENT)
Dept: PRIMARY CARE CLINIC | Age: 54
End: 2019-05-07

## 2019-05-07 VITALS
OXYGEN SATURATION: 95 % | HEART RATE: 92 BPM | DIASTOLIC BLOOD PRESSURE: 92 MMHG | RESPIRATION RATE: 18 BRPM | SYSTOLIC BLOOD PRESSURE: 116 MMHG | HEIGHT: 63 IN | WEIGHT: 293 LBS | BODY MASS INDEX: 51.91 KG/M2

## 2019-05-07 PROCEDURE — 95811 POLYSOM 6/>YRS CPAP 4/> PARM: CPT | Performed by: PSYCHIATRY & NEUROLOGY

## 2019-05-07 ASSESSMENT — SLEEP AND FATIGUE QUESTIONNAIRES
HOW LIKELY ARE YOU TO NOD OFF OR FALL ASLEEP WHILE SITTING INACTIVE IN A PUBLIC PLACE: 0
HOW LIKELY ARE YOU TO NOD OFF OR FALL ASLEEP WHILE WATCHING TV: 3
HOW LIKELY ARE YOU TO NOD OFF OR FALL ASLEEP WHILE SITTING QUIETLY AFTER LUNCH WITHOUT ALCOHOL: 2
HOW LIKELY ARE YOU TO NOD OFF OR FALL ASLEEP WHILE LYING DOWN TO REST IN THE AFTERNOON WHEN CIRCUMSTANCES PERMIT: 3
HOW LIKELY ARE YOU TO NOD OFF OR FALL ASLEEP WHEN YOU ARE A PASSENGER IN A CAR FOR AN HOUR WITHOUT A BREAK: 0
HOW LIKELY ARE YOU TO NOD OFF OR FALL ASLEEP IN A CAR, WHILE STOPPED FOR A FEW MINUTES IN TRAFFIC: 0
HOW LIKELY ARE YOU TO NOD OFF OR FALL ASLEEP WHILE SITTING AND READING: 3
ESS TOTAL SCORE: 11
HOW LIKELY ARE YOU TO NOD OFF OR FALL ASLEEP WHILE SITTING AND TALKING TO SOMEONE: 0

## 2019-05-07 NOTE — TELEPHONE ENCOUNTER
Kendra Riddle because she failed to show up for her appointment with Dr. Ash Junior on 05/07/2019. I was unable to leave a message because the patient's mailbox was not set up. Patient marked as no show and sent letter.

## 2019-05-21 LAB — STATUS: NORMAL

## 2019-05-24 ENCOUNTER — OFFICE VISIT (OUTPATIENT)
Dept: PRIMARY CARE CLINIC | Age: 54
End: 2019-05-24
Payer: COMMERCIAL

## 2019-05-24 VITALS
WEIGHT: 293 LBS | DIASTOLIC BLOOD PRESSURE: 104 MMHG | HEART RATE: 110 BPM | BODY MASS INDEX: 72.66 KG/M2 | OXYGEN SATURATION: 98 % | SYSTOLIC BLOOD PRESSURE: 132 MMHG

## 2019-05-24 DIAGNOSIS — G47.33 OSA (OBSTRUCTIVE SLEEP APNEA): ICD-10-CM

## 2019-05-24 DIAGNOSIS — E11.9 NEW ONSET TYPE 2 DIABETES MELLITUS (HCC): ICD-10-CM

## 2019-05-24 DIAGNOSIS — E11.9 TYPE 2 DIABETES MELLITUS WITHOUT COMPLICATION, WITH LONG-TERM CURRENT USE OF INSULIN (HCC): Primary | ICD-10-CM

## 2019-05-24 DIAGNOSIS — I10 ESSENTIAL HYPERTENSION: ICD-10-CM

## 2019-05-24 DIAGNOSIS — G47.33 OSA TREATED WITH BIPAP: ICD-10-CM

## 2019-05-24 DIAGNOSIS — Z79.4 TYPE 2 DIABETES MELLITUS WITHOUT COMPLICATION, WITH LONG-TERM CURRENT USE OF INSULIN (HCC): Primary | ICD-10-CM

## 2019-05-24 LAB — HBA1C MFR BLD: 9.9 %

## 2019-05-24 PROCEDURE — 99214 OFFICE O/P EST MOD 30 MIN: CPT | Performed by: FAMILY MEDICINE

## 2019-05-24 PROCEDURE — 83036 HEMOGLOBIN GLYCOSYLATED A1C: CPT | Performed by: FAMILY MEDICINE

## 2019-05-24 ASSESSMENT — ENCOUNTER SYMPTOMS: RESPIRATORY NEGATIVE: 1

## 2019-05-24 NOTE — PROGRESS NOTES
Potassium monitoring  09/19/2019    Creatinine monitoring  09/19/2019    A1C test (Diabetic or Prediabetic)  01/29/2020    Diabetic microalbuminuria test  01/29/2020    Flu vaccine  Completed    Pneumococcal 0-64 years Vaccine  Completed       Subjective:     Review of Systems   Constitutional: Negative for fatigue. Respiratory: Negative. Objective:     BP (!) 132/104   Pulse 110   Wt (!) 410 lb 3.2 oz (186.1 kg)   SpO2 98%   BMI 72.66 kg/m²   Physical Exam   Constitutional: She is oriented to person, place, and time. She appears well-developed and well-nourished. No distress. HENT:   Head: Normocephalic and atraumatic. Right Ear: External ear normal.   Left Ear: External ear normal.   Mouth/Throat: Oropharynx is clear and moist.   Small mouth opening and pharynx   Eyes: Pupils are equal, round, and reactive to light. Conjunctivae are normal. Right eye exhibits no discharge. Left eye exhibits no discharge. No scleral icterus. Neck: No tracheal deviation present. No thyromegaly present. Cardiovascular: Normal rate, regular rhythm and normal heart sounds. No carotid bruits   Pulmonary/Chest: Effort normal and breath sounds normal. No respiratory distress. She has no wheezes. Lymphadenopathy:     She has no cervical adenopathy. Neurological: She is alert and oriented to person, place, and time. Skin: Skin is warm. No rash noted. Psychiatric: She has a normal mood and affect. Her behavior is normal. Thought content normal.   Nursing note and vitals reviewed. Assessment:       Diagnosis Orders   1.  Type 2 diabetes mellitus without complication, with long-term current use of insulin ([facility])  POCT glycosylated hemoglobin (Hb A1C)    AK COLLECTION CAPILLARY BLOOD SPECIMEN    Dulaglutide 1.5 MG/0.5ML SOPN   2. New onset type 2 diabetes mellitus (HCC)  POCT glycosylated hemoglobin (Hb A1C)    AK COLLECTION CAPILLARY BLOOD SPECIMEN    Dulaglutide 1.5 MG/0.5ML SOPN   3. KIANA (obstructive sleep apnea)  DME Order for BiPAP as OP    Breana Grewal MD, Pulmonology, [place]   4. Essential hypertension     5. KIANA treated with BiPAP  DME Order for BiPAP as OP    Breana Grewal MD, Pulmonology[address]:      Return in about 3 months (around 8/24/2019) for diabetes mellitus. Call if not better with sugars in a month. Try trulcity  discussed diet at length    Orders Placed This Encounter   Procedures   Breana Grewal MD, Pulmonology, [place]     Referral Priority:   Routine     Referral Type:   Eval and Treat     Referral Reason:   Specialty Services Required     Referred to Provider:   Franny Silva MD     Requested Specialty:   Pulmonology     Number of Visits Requested:   1    POCT glycosylated hemoglobin (Hb A1C)    IN COLLECTION CAPILLARY BLOOD SPECIMEN    DME Order for BiPAP as OP     BI-LEVEL (RAD, bi-level  pressure capability  without backup rate feature) IPAP 19 cm H2O / EPAP 15 cm H2O    Heated Humidifier    Full Face Mask 1 per 3 months and Cushions/Seals 2 per month    Headgear 1 per 6 months, Chin Strap 1 per 6 months, Disposable Filters 2 per month, Non-disposable Filters 1 per 6 months, Tubing 1 per 3 months, Integrated Heating Element 1 per 3 months, Chambers 1 per 6 months and Other Related Supplies. Replace supplies and accessories as needed. Patient may choose another interface for compliance and comfort. Comments: KIANA needs BIPAP  Diagnosis: KIANA  Length of need: Lifetime     Orders Placed This Encounter   Medications    Dulaglutide 1.5 MG/0.5ML SOPN     Sig: Inject 1.5 mg into the skin once a week     Dispense:  12 pen     Refill:  3       Patient given educational materials - see patient instructions. Do diabetic foot checks at home. Discussed use, benefit, and side effects of prescribed medications. All patient questions answered. Pt voiced understanding. Reviewed health maintenance.   Instructed to continue current medications, diet

## 2019-05-24 NOTE — PATIENT INSTRUCTIONS
Patient Education        Diabetes Foot Health: Care Instructions  Your Care Instructions    When you have diabetes, your feet need extra care and attention. Diabetes can damage the nerve endings and blood vessels in your feet, making you less likely to notice when your feet are injured. Diabetes also limits your body's ability to fight infection and get blood to areas that need it. If you get a minor foot injury, it could become an ulcer or a serious infection. With good foot care, you can prevent most of these problems. Caring for your feet can be quick and easy. Most of the care can be done when you are bathing or getting ready for bed. Follow-up care is a key part of your treatment and safety. Be sure to make and go to all appointments, and call your doctor if you are having problems. It's also a good idea to know your test results and keep a list of the medicines you take. How can you care for yourself at home? · Keep your blood sugar close to normal by watching what and how much you eat, monitoring blood sugar, taking medicines if prescribed, and getting regular exercise. · Do not smoke. Smoking affects blood flow and can make foot problems worse. If you need help quitting, talk to your doctor about stop-smoking programs and medicines. These can increase your chances of quitting for good. · Eat a diet that is low in fats. High fat intake can cause fat to build up in your blood vessels and decrease blood flow. · Inspect your feet daily for blisters, cuts, cracks, or sores. If you cannot see well, use a mirror or have someone help you. · Take care of your feet:  ? Wash your feet every day. Use warm (not hot) water. Check the water temperature with your wrists or other part of your body, not your feet. ? Dry your feet well. Pat them dry. Do not rub the skin on your feet too hard. Dry well between your toes.  If the skin on your feet stays moist, bacteria or a fungus can grow, which can lead to infection. ? Keep your skin soft. Use moisturizing skin cream to keep the skin on your feet soft and prevent calluses and cracks. But do not put the cream between your toes, and stop using any cream that causes a rash. ? Clean underneath your toenails carefully. Do not use a sharp object to clean underneath your toenails. Use the blunt end of a nail file or other rounded tool. ? Trim and file your toenails straight across to prevent ingrown toenails. Use a nail clipper, not scissors. Use an emery board to smooth the edges. · Change socks daily. Socks without seams are best, because seams often rub the feet. You can find socks for people with diabetes from specialty catalogs. · Look inside your shoes every day for things like gravel or torn linings, which could cause blisters or sores. · Buy shoes that fit well:  ? Look for shoes that have plenty of space around the toes. This helps prevent bunions and blisters. ? Try on shoes while wearing the kind of socks you will usually wear with the shoes. ? Avoid plastic shoes. They may rub your feet and cause blisters. Good shoes should be made of materials that are flexible and breathable, such as leather or cloth. ? Break in new shoes slowly by wearing them for no more than an hour a day for several days. Take extra time to check your feet for red areas, blisters, or other problems after you wear new shoes. · Do not go barefoot. Do not wear sandals, and do not wear shoes with very thin soles. Thin soles are easy to puncture. They also do not protect your feet from hot pavement or cold weather. · Have your doctor check your feet during each visit. If you have a foot problem, see your doctor. Do not try to treat an early foot problem at home. Home remedies or treatments that you can buy without a prescription (such as corn removers) can be harmful. · Always get early treatment for foot problems.  A minor irritation can lead to a major problem if not properly cared for early. When should you call for help? Call your doctor now or seek immediate medical care if:    · You have a foot sore, an ulcer or break in the skin that is not healing after 4 days, bleeding corns or calluses, or an ingrown toenail.     · You have blue or black areas, which can mean bruising or blood flow problems.     · You have peeling skin or tiny blisters between your toes or cracking or oozing of the skin.     · You have a fever for more than 24 hours and a foot sore.     · You have new numbness or tingling in your feet that does not go away after you move your feet or change positions.     · You have unexplained or unusual swelling of the foot or ankle.    Watch closely for changes in your health, and be sure to contact your doctor if:    · You cannot do proper foot care. Where can you learn more? Go to https://G2 Crowdpeshae.Rx Networks. org and sign in to your Qualnetics account. Enter A739 in the AXSUN Technologies box to learn more about \"Diabetes Foot Health: Care Instructions. \"     If you do not have an account, please click on the \"Sign Up Now\" link. Current as of: July 25, 2018  Content Version: 12.0  © 5776-0275 ZeeVee. Care instructions adapted under license by [facility] ([facility]). If you have questions about a medical condition or this instruction, always ask your healthcare professional. [place] [address] any warranty or liability for your use of this information. Patient Education        Learning About Diabetes Food Guidelines  Your Care Instructions    Meal planning is important to manage diabetes. It helps keep your blood sugar at a target level (which you set with your doctor). You don't have to eat special foods. You can eat what your family eats, including sweets once in a while. But you do have to pay attention to how often you eat and how much you eat of certain foods.   You may want to work with a dietitian or a certified diabetes amount of carbs at each meal. Do not \"save up\" your daily allowance of carbs to eat at one meal.  · Proteins have very little or no carbs per serving. Examples of proteins are beef, chicken, turkey, fish, eggs, tofu, cheese, cottage cheese, and peanut butter. A serving size of meat is 3 ounces, which is about the size of a deck of cards. Examples of meat substitute serving sizes (equal to 1 ounce of meat) are 1/4 cup of cottage cheese, 1 egg, 1 tablespoon of peanut butter, and ½ cup of tofu. How can you eat out and still eat healthy? · Learn to estimate the serving sizes of foods that have carbohydrate. If you measure food at home, it will be easier to estimate the amount in a serving of restaurant food. · If the meal you order has too much carbohydrate (such as potatoes, corn, or baked beans), ask to have a low-carbohydrate food instead. Ask for a salad or green vegetables. · If you use insulin, check your blood sugar before and after eating out to help you plan how much to eat in the future. · If you eat more carbohydrate at a meal than you had planned, take a walk or do other exercise. This will help lower your blood sugar. What else should you know? · Limit saturated fat, such as the fat from meat and dairy products. This is a healthy choice because people who have diabetes are at higher risk of heart disease. So choose lean cuts of meat and nonfat or low-fat dairy products. Use olive or canola oil instead of butter or shortening when cooking. · Don't skip meals. Your blood sugar may drop too low if you skip meals and take insulin or certain medicines for diabetes. · Check with your doctor before you drink alcohol. Alcohol can cause your blood sugar to drop too low. Alcohol can also cause a bad reaction if you take certain diabetes medicines. Follow-up care is a key part of your treatment and safety. Be sure to make and go to all appointments, and call your doctor if you are having problems.  It's also a good idea to know your test results and keep a list of the medicines you take. Where can you learn more? Go to https://chpepiceweb.Gizmo5. org and sign in to your Tranzlogic account. Enter N623 in the [facility] box to learn more about \"Learning About Diabetes Food Guidelines. \"     If you do not have an account, please click on the \"Sign Up Now\" link. Current as of: July 25, 2018  Content Version: 12.0  © 5049-4817 Healthwise, Incorporated. Care instructions adapted under license by [facility] ([facility]). If you have questions about a medical condition or this instruction, always ask your healthcare professional. [address] any warranty or liability for your use of this information.

## 2019-05-28 ENCOUNTER — OFFICE VISIT (OUTPATIENT)
Dept: BARIATRICS/WEIGHT MGMT | Age: 54
End: 2019-05-28
Payer: COMMERCIAL

## 2019-05-28 VITALS
WEIGHT: 293 LBS | HEART RATE: 72 BPM | BODY MASS INDEX: 51.91 KG/M2 | SYSTOLIC BLOOD PRESSURE: 128 MMHG | HEIGHT: 63 IN | RESPIRATION RATE: 20 BRPM | DIASTOLIC BLOOD PRESSURE: 84 MMHG

## 2019-05-28 DIAGNOSIS — G47.33 OSA (OBSTRUCTIVE SLEEP APNEA): Primary | Chronic | ICD-10-CM

## 2019-05-28 DIAGNOSIS — E11.9 TYPE 2 DIABETES MELLITUS WITHOUT COMPLICATION, WITH LONG-TERM CURRENT USE OF INSULIN (HCC): ICD-10-CM

## 2019-05-28 DIAGNOSIS — Z79.4 TYPE 2 DIABETES MELLITUS WITHOUT COMPLICATION, WITH LONG-TERM CURRENT USE OF INSULIN (HCC): ICD-10-CM

## 2019-05-28 PROCEDURE — 99204 OFFICE O/P NEW MOD 45 MIN: CPT | Performed by: SURGERY

## 2019-05-28 NOTE — PROGRESS NOTES
Subjective     The patient is a 47 y.o. female being seen regarding weight loss surgery. The patient's PCP is Josh Wang MD .  The patient has struggled with her weight for most of her adult life. Yamini's current Body mass index is 71.76 kg/m². (5/28/19). The patient reports that her obesity is significantly affecting her life on a daily basis. She voiced very early on in her conversation that she is very scared and not sure if she wants surgery but feels that it is her only option. She reports feeling very frustrated and limited by her weight. Weight Loss Program History:  The patient states she has tried Weight Watchers, low calorie, Atkins diets. These attempts have been somewhat successful with weight loss, but have failed to sustain adequate weight loss. The patient has also tried self directed diet and exercise in attempts to sustain weight loss. Past Medical History:   Diagnosis Date    Cancer [facility]) 2013    Diabetes mellitus ([facility] [address]     Hypertension     Sleep apnea    . Review of Systems  Do you or have you had any of the following?   Cardiovascular YES NO Respiratory YES NO   High Blood Pressure   [x]   [] COPD   []   [x]   Heart Attack   []   [x] TB/Positive skin Test   []   [x]   Congestive Heart Failure   []   [x] Obstructive Sleep Apnea   [x]   []   Coronary Artery Disease   []   [x] Asthma   []   [x]   Circulation Problems   []   [x]      Activity Intolerance   []   [x] Gastrointestinal YES NO   Peripheral Vascular Disease   []   [x] Gastric Problems   []   [x]        Colorectal problems   []   [x]   Hematological YES NO Ulcer disease   []   [x]   Bleeding Tendencies   []   [x] Liver disease   []   [x]   Blood Transfusion last 30d   []   [x] Gallstones   []   [x]   Anemia   []   [x] Refulx or Heartburn   []   [x]   Blood Clots   []   [x]      High Cholesterol   []   [x] Muscoloskeletal YES NO   High Triglycerides   []   [x] Joint Limitations   []   [x]      Muscle Weakness intact. Psychiatric: The patient has a normal mood and affect. Speech is normal and behavior is normal. Judgment and thought content normal. Cognition and memory are normal.     Assessment     Patient Active Problem List   Diagnosis    New onset type 2 diabetes mellitus ([address]    BMI 70 and over, adult ([facility] [address]    Adult-onset obesity    Type 2 diabetes mellitus without complication, with long-term current use of insulin (HCC)    Sinus headache    Nasal congestion    KIANA (obstructive sleep apnea)    Nocturnal hypoxia       Plan   I explained that I feel that this very nice and lady would benefit very much from having her after surgery. However, she is extremely hesitant at this time and I'm not sure that proceeding with surgery as the right choice for her. I did offer enrollment in our nonsurgical programs. She did agree to attend a nonsurgical information session. I did explain that trying that first would be a good option and would be a good way to work toward surgery if she decided to proceed with that anyway. She and her  both agree with this plan.

## 2019-06-06 ENCOUNTER — OFFICE VISIT (OUTPATIENT)
Dept: PRIMARY CARE CLINIC | Age: 54
End: 2019-06-06
Payer: COMMERCIAL

## 2019-06-06 VITALS
WEIGHT: 293 LBS | OXYGEN SATURATION: 94 % | BODY MASS INDEX: 51.91 KG/M2 | HEIGHT: 63 IN | HEART RATE: 110 BPM | RESPIRATION RATE: 16 BRPM | DIASTOLIC BLOOD PRESSURE: 86 MMHG | SYSTOLIC BLOOD PRESSURE: 122 MMHG

## 2019-06-06 DIAGNOSIS — M54.42 ACUTE LEFT-SIDED LOW BACK PAIN WITH LEFT-SIDED SCIATICA: Primary | ICD-10-CM

## 2019-06-06 PROCEDURE — 99213 OFFICE O/P EST LOW 20 MIN: CPT | Performed by: NURSE PRACTITIONER

## 2019-06-06 RX ORDER — IBUPROFEN 800 MG/1
800 TABLET ORAL
Qty: 60 TABLET | Refills: 0 | Status: SHIPPED | OUTPATIENT
Start: 2019-06-06 | End: 2019-09-18 | Stop reason: SDUPTHER

## 2019-06-06 ASSESSMENT — PATIENT HEALTH QUESTIONNAIRE - PHQ9
1. LITTLE INTEREST OR PLEASURE IN DOING THINGS: 0
SUM OF ALL RESPONSES TO PHQ QUESTIONS 1-9: 0
SUM OF ALL RESPONSES TO PHQ QUESTIONS 1-9: 0
2. FEELING DOWN, DEPRESSED OR HOPELESS: 0
SUM OF ALL RESPONSES TO PHQ9 QUESTIONS 1 & 2: 0

## 2019-06-06 ASSESSMENT — ENCOUNTER SYMPTOMS
PHOTOPHOBIA: 0
COUGH: 0
NAUSEA: 0
BACK PAIN: 1
SHORTNESS OF BREATH: 0
DIARRHEA: 0
CONSTIPATION: 0

## 2019-06-06 NOTE — PROGRESS NOTES
[facility] PRIMARY CARE  [id number]  Dept: [phone]    Ernesto Mustafa is a 47 y.o. female who presents today for her medical conditions/complaintsas noted below. Chief Complaint   Patient presents with    Lower Back Pain     Injured back while swimming on Friday. HPI:     Back Pain   This is a new problem. The current episode started in the past 7 days (swimming last Friday, did not over do it but did  go for a long time). Pertinent negatives include no chest pain, dysuria, fever or headaches. Rested on Saturday and  and felt better. Aggravated this week will packing for vacation. She had bad sciatic pain in past.  She has done physical therapy  Going on vacation . Left sided buttocks pain - tingling, burring in left buttocks. Hurts to stand.   Lifestyle balance program for weight loss    LDL Calculated (mg/dL)   Date Value   2011 89       (goal LDL is <100)   AST (U/L)   Date Value   2011 19     ALT (U/L)   Date Value   2011 18     BUN (mg/dL)   Date Value   2018 15     BP Readings from Last 3 Encounters:   19 122/86   19 128/84   19 (!) 132/104          (goal 120/80)    Past Medical History:   Diagnosis Date    Cancer ([address]     Diabetes mellitus ([address]     Hypertension     Sleep apnea       Past Surgical History:   Procedure Laterality Date     SECTION      HYSTERECTOMY, TOTAL ABDOMINAL      OTHER SURGICAL HISTORY      [id number] multiple procedures to remove cancer       Family History   Problem Relation Age of Onset    Cancer Mother         thyroid    Diabetes Mother     Anemia Other     Cancer Other     Breast Cancer Other     Diabetes Other     High Blood Pressure Other     Heart Disease Other     Hypertension Father        Social History     Tobacco Use    Smoking status: Never Smoker    Smokeless tobacco: Never Used   Substance Use Topics  Alcohol use: No      Current Outpatient Medications   Medication Sig Dispense Refill    ibuprofen (ADVIL;MOTRIN) 800 MG tablet Take 1 tablet by mouth 3 times daily (with meals) 60 tablet 0    Dulaglutide 1.5 MG/0.5ML SOPN Inject 1.5 mg into the skin once a week 12 pen 3    insulin detemir (LEVEMIR FLEXTOUCH) 100 UNIT/ML injection pen Inject 55 Units into the skin daily 45 mL 1    Saline (SIMPLY SALINE) 0.9 % AERS 2 sprays by Nasal route 4 times daily  0    Lancets MISC Test BID and on insulin 100 each 11    blood glucose test strips (ASCENSIA AUTODISC VI;ONE TOUCH ULTRA TEST VI) strip Test BID  On insulin, type 2  strip 11    Insulin Pen Needle (B-D ULTRAFINE III SHORT PEN) 31G X 8 MM MISC Inject 1 each into the skin daily May substitute with any brand 100 each 11    triamterene-hydrochlorothiazide (MAXZIDE) 75-50 MG per tablet Take 1 tablet by mouth daily 90 tablet 3    metFORMIN (GLUCOPHAGE) 500 MG tablet Take 1 tablet by mouth 2 times daily (with meals) 180 tablet 3    meloxicam (MOBIC) 15 MG tablet Take 1 tablet by mouth daily 90 tablet 3     No current facility-administered medications for this visit.       Allergies   Allergen Reactions    Latex Itching and Rash    Penicillins Anaphylaxis and Other (See Comments)     Passes out, all penicillins        Health Maintenance   Topic Date Due    Diabetic foot exam  11/04/1976    Diabetic retinal exam  11/04/1976    HIV screen  11/04/1981    Hepatitis B Vaccine (1 of 3 - Risk 3-dose series) 11/04/1985    DTaP/Tdap/Td vaccine (1 - Tdap) 11/04/1985    Lipid screen  01/08/2012    Breast cancer screen  11/04/2016    Shingles Vaccine (1 of 2) 11/04/2016    Colon cancer screen colonoscopy  11/04/2016    A1C test (Diabetic or Prediabetic)  08/24/2019    Potassium monitoring  09/19/2019    Creatinine monitoring  09/19/2019    Diabetic microalbuminuria test  01/29/2020    Flu vaccine  Completed    Pneumococcal 0-64 years Vaccine  Completed Subjective:      Review of Systems   Constitutional: Negative for chills, fatigue and fever. HENT: Negative for congestion, ear pain and nosebleeds. Eyes: Negative for photophobia and visual disturbance. Respiratory: Negative for cough and shortness of breath. Cardiovascular: Negative for chest pain, palpitations and leg swelling. Gastrointestinal: Negative for constipation, diarrhea and nausea. Genitourinary: Negative for difficulty urinating and dysuria. Musculoskeletal: Positive for back pain and gait problem. Skin: Negative for rash and wound. Neurological: Negative for dizziness, light-headedness and headaches. Hematological: Negative for adenopathy. Does not bruise/bleed easily. Psychiatric/Behavioral: Positive for sleep disturbance. Negative for dysphoric mood. The patient is not nervous/anxious. Objective:     /86   Pulse 110   Resp 16   Ht 5' 3\" (1.6 m)   Wt (!) 406 lb 9.6 oz (184.4 kg)   SpO2 94%   BMI 72.03 kg/m²   Physical Exam   Constitutional: She is oriented to person, place, and time. She appears well-developed and well-nourished. HENT:   Head: Normocephalic and atraumatic. Right Ear: External ear normal.   Left Ear: External ear normal.   Mouth/Throat: Oropharynx is clear and moist.   Eyes: Pupils are equal, round, and reactive to light. EOM are normal.   Neck: Normal range of motion. Neck supple. No thyromegaly present. Cardiovascular: Normal rate, regular rhythm and normal heart sounds. No murmur heard. No carotid bruit   Pulmonary/Chest: Effort normal and breath sounds normal.   Abdominal: Soft. Bowel sounds are normal.   Musculoskeletal:        Lumbar back: She exhibits decreased range of motion, tenderness and pain. Unable to get on exam table  Tenderness at left iliac crest and left sacroiliac joint   Neurological: She is alert and oriented to person, place, and time. No cranial nerve deficit. Skin: Skin is warm and dry.  Capillary

## 2019-06-06 NOTE — PATIENT INSTRUCTIONS
Apply heat and ice to effective area. First apply heat to effective area for 15-20 minutes then immediately afterward apply ice for 15-20 minutes 3x per day. Hold Meloxicam when taking Ibuprofen.     Physical Therapy

## 2019-06-10 ENCOUNTER — TELEPHONE (OUTPATIENT)
Dept: PRIMARY CARE CLINIC | Age: 54
End: 2019-06-10

## 2019-06-10 NOTE — TELEPHONE ENCOUNTER
Left a message for Izzy Birmingham regarding her referral to PT. Please ask where she is going to PT when the patient returns the call. Thank you.

## 2019-06-19 ENCOUNTER — TELEPHONE (OUTPATIENT)
Dept: BARIATRICS/WEIGHT MGMT | Age: 54
End: 2019-06-19

## 2019-06-20 ENCOUNTER — OFFICE VISIT (OUTPATIENT)
Dept: PODIATRY | Age: 54
End: 2019-06-20
Payer: COMMERCIAL

## 2019-06-20 VITALS — WEIGHT: 293 LBS | HEIGHT: 63 IN | BODY MASS INDEX: 51.91 KG/M2

## 2019-06-20 DIAGNOSIS — B35.1 ONYCHOMYCOSIS OF TOENAIL: Primary | ICD-10-CM

## 2019-06-20 DIAGNOSIS — M79.674 PAIN OF TOES OF BOTH FEET: ICD-10-CM

## 2019-06-20 DIAGNOSIS — M79.675 PAIN OF TOES OF BOTH FEET: ICD-10-CM

## 2019-06-20 DIAGNOSIS — E11.51 TYPE 2 DIABETES MELLITUS WITH PERIPHERAL VASCULAR DISEASE (HCC): ICD-10-CM

## 2019-06-20 PROCEDURE — 99213 OFFICE O/P EST LOW 20 MIN: CPT | Performed by: PODIATRIST

## 2019-06-20 ASSESSMENT — ENCOUNTER SYMPTOMS
COLOR CHANGE: 0
DIARRHEA: 0
NAUSEA: 0
SHORTNESS OF BREATH: 0
BACK PAIN: 0

## 2019-06-20 NOTE — PROGRESS NOTES
SUBJECTIVE: Kirby Go is a 47 y.o. female presents to the office today for a diabetic foot exam. Patient relates toe nails are thickened/difficult to trim as well as painful with ambulation and with shoe gear. Chief Complaint   Patient presents with    Nail Problem     b/l nail trim/ last seen Inna William 6/6/19    Diabetes     last blood sugar 173     Review of Systems   Constitutional: Negative for activity change, appetite change, chills, diaphoresis, fatigue and fever. Respiratory: Negative for shortness of breath. Cardiovascular: Negative for leg swelling. Gastrointestinal: Negative for diarrhea and nausea. Endocrine: Negative for cold intolerance, heat intolerance and polyuria. Musculoskeletal: Positive for arthralgias. Negative for back pain, gait problem, joint swelling and myalgias. Skin: Negative for color change, pallor, rash and wound. Allergic/Immunologic: Negative for environmental allergies and food allergies. Neurological: Negative for dizziness, weakness, light-headedness and numbness. Hematological: Does not bruise/bleed easily. Psychiatric/Behavioral: Negative for behavioral problems, confusion and self-injury. The patient is not nervous/anxious. OBJECTIVE: Clinical evaluation of patient reveals nails 1,2,3,4,5 of the right foot and nails 1,2,3,4,5, of the left foot to present with mild thickness, discoloration, brittleness, and subungual debris. There was pain with palpation of the toenails of the bilateral feet. No open lesions noted to either foot today. The toenails are not elongated today. The right DP pulse is not palpable. The left DP pulse is not palpable. The right PT pulse is not palpable. The left PT pulse is not palpable. Protective sensation is present to the right plantar foot as noted with a 5.07 [place] monofilament. Protective sensation is present to the left plantar foot as noted with a 5.07 [place] monofilament. Glucose: 173 mg/dl. ASSESSMENT:    Diagnosis Orders   1. Onychomycosis of toenail  ciclopirox (PENLAC) 8 % solution   2. Pain of toes of both feet  ciclopirox (PENLAC) 8 % solution    VL LOWER EXTREMITY ARTERIAL SEGMENTAL PRESSURES W PPG BILATERAL   3. Type 2 diabetes mellitus with peripheral vascular disease (HCC)  ciclopirox (PENLAC) 8 % solution    VL LOWER EXTREMITY ARTERIAL SEGMENTAL PRESSURES W PPG BILATERAL     PLAN: Patient given a prescription for Penlac nail solution with instructions on proper use. Patient given an order for AMY's to evaluate blood flow to the lower extremities. Return in about 3 months (around 9/20/2019) for At risk diabetic foot care.    6/20/2019      Anthony Covington DPM

## 2019-06-27 ENCOUNTER — OFFICE VISIT (OUTPATIENT)
Dept: BARIATRICS/WEIGHT MGMT | Age: 54
End: 2019-06-27
Payer: COMMERCIAL

## 2019-06-27 VITALS
SYSTOLIC BLOOD PRESSURE: 138 MMHG | BODY MASS INDEX: 50.02 KG/M2 | HEART RATE: 92 BPM | DIASTOLIC BLOOD PRESSURE: 80 MMHG | HEIGHT: 64 IN | WEIGHT: 293 LBS

## 2019-06-27 DIAGNOSIS — E28.2 PCOS (POLYCYSTIC OVARIAN SYNDROME): ICD-10-CM

## 2019-06-27 DIAGNOSIS — Z79.4 TYPE 2 DIABETES MELLITUS WITHOUT COMPLICATION, WITH LONG-TERM CURRENT USE OF INSULIN (HCC): Primary | ICD-10-CM

## 2019-06-27 DIAGNOSIS — Z85.42 HISTORY OF ENDOMETRIAL CANCER: ICD-10-CM

## 2019-06-27 DIAGNOSIS — G47.33 OSA (OBSTRUCTIVE SLEEP APNEA): Chronic | ICD-10-CM

## 2019-06-27 DIAGNOSIS — I10 ESSENTIAL HYPERTENSION: ICD-10-CM

## 2019-06-27 DIAGNOSIS — E11.9 TYPE 2 DIABETES MELLITUS WITHOUT COMPLICATION, WITH LONG-TERM CURRENT USE OF INSULIN (HCC): Primary | ICD-10-CM

## 2019-06-27 DIAGNOSIS — E66.01 MORBID OBESITY WITH BMI OF 60.0-69.9, ADULT (HCC): ICD-10-CM

## 2019-06-27 PROBLEM — R51.9 SINUS HEADACHE: Status: RESOLVED | Noted: 2019-01-29 | Resolved: 2019-06-27

## 2019-06-27 PROBLEM — R09.81 NASAL CONGESTION: Status: RESOLVED | Noted: 2019-01-29 | Resolved: 2019-06-27

## 2019-06-27 PROCEDURE — 99204 OFFICE O/P NEW MOD 45 MIN: CPT | Performed by: NURSE PRACTITIONER

## 2019-06-27 NOTE — PROGRESS NOTES
name: Not on file    Number of children: Not on file    Years of education: Not on file    Highest education level: Not on file   Occupational History    Not on file   Social Needs    Financial resource strain: Not on file    Food insecurity:     Worry: Not on file     Inability: Not on file    Transportation needs:     Medical: Not on file     Non-medical: Not on file   Tobacco Use    Smoking status: Never Smoker    Smokeless tobacco: Never Used   Substance and Sexual Activity    Alcohol use: No    Drug use: No    Sexual activity: Not on file   Lifestyle    Physical activity:     Days per week: Not on file     Minutes per session: Not on file    Stress: Not on file   Relationships    Social connections:     Talks on phone: Not on file     Gets together: Not on file     Attends Evangelical service: Not on file     Active member of club or organization: Not on file     Attends meetings of clubs or organizations: Not on file     Relationship status: Not on file    Intimate partner violence:     Fear of current or ex partner: Not on file     Emotionally abused: Not on file     Physically abused: Not on file     Forced sexual activity: Not on file   Other Topics Concern    Not on file   Social History Narrative    Not on file       Current Medications:  Current Outpatient Medications   Medication Sig Dispense Refill    ciclopirox (PENLAC) 8 % solution Apply topically nightly. Remove once weekly with alcohol or nail polish remover.  1 Bottle 3    ibuprofen (ADVIL;MOTRIN) 800 MG tablet Take 1 tablet by mouth 3 times daily (with meals) 60 tablet 0    Dulaglutide 1.5 MG/0.5ML SOPN Inject 1.5 mg into the skin once a week 12 pen 3    insulin detemir (LEVEMIR FLEXTOUCH) 100 UNIT/ML injection pen Inject 55 Units into the skin daily 45 mL 1    Saline (SIMPLY SALINE) 0.9 % AERS 2 sprays by Nasal route 4 times daily  0    Lancets MISC Test BID and on insulin 100 each 11    blood glucose test strips (ASCENSIA AUTODISC VI;ONE TOUCH ULTRA TEST VI) strip Test BID  On insulin, type 2  strip 11    Insulin Pen Needle (B-D ULTRAFINE III SHORT PEN) 31G X 8 MM MISC Inject 1 each into the skin daily May substitute with any brand 100 each 11    triamterene-hydrochlorothiazide (MAXZIDE) 75-50 MG per tablet Take 1 tablet by mouth daily 90 tablet 3    metFORMIN (GLUCOPHAGE) 500 MG tablet Take 1 tablet by mouth 2 times daily (with meals) 180 tablet 3    meloxicam (MOBIC) 15 MG tablet Take 1 tablet by mouth daily 90 tablet 3     No current facility-administered medications for this visit. Vital Signs:  /80 (Site: Left Lower Arm, Position: Sitting, Cuff Size: Large Adult)   Pulse 92   Ht 5' 4\" (1.626 m)   Wt (!) 399 lb 6.4 oz (181.2 kg)   BMI 68.56 kg/m²     BMI/Height/Weight:  Body mass index is 68.56 kg/m². Waist Circumference 66\"            Review of Systems - A review of systems was performed. All was negative unless otherwise documented in HPI. Constitutional: Negative for fever, chills and diaphoresis. HENT: Negative for hearing loss and trouble swallowing. Eyes: Negative for photophobia and visual disturbance. Respiratory: Negative for cough, shortness of breath and wheezing. Cardiovascular: Negative for chest pain and palpitations. Gastrointestinal: Negative for nausea, vomiting, abdominal pain, diarrhea, constipation, blood in stool and abdominal distention. Endocrine: Negative for polydipsia, polyphagia and polyuria. Genitourinary: Negative for dysuria, frequency, hematuria and difficulty urinating. Musculoskeletal: Negative for myalgias, joint swelling. Skin: Negative for pallor and rash. Neurological: Negative for dizziness, tremors, light-headedness and headaches. Psychiatric/Behavioral: Negative for sleep disturbance and dysphoric mood. Objective:      Physical Exam   Vital signs reviewed. General: Well-developed and well-nourished. No acute distress.    Skin: Warm, dry and intact. HEENT: Normocephalic. EOMs intact. Conjunctivae normal. Neck supple. Cardiovascular: Normal rate, regular rhythm. Pulmonary/Chest: Normal effort. Lungs clear to auscultation. No rales, rhonchi or wheezing. Abdominal: Positive bowel sounds. Soft, nontender. Nondistended. Musculoskeletal: Movement x4. Bilateral lower extremity edema. Neurological: Gait normal. Alert and oriented to person, place, and time. Psychiatric: Normal mood and affect. Speech and behavior normal. Judgment and thought content normal. Cognition and memory intact. Assessment:       Diagnosis Orders   1. Type 2 diabetes mellitus without complication, with long-term current use of insulin ([facility])  CBC Auto Differential    Comprehensive Metabolic Panel    Lipid Panel    TSH without Reflex    Uric Acid    EKG 12 Lead   2. KIANA (obstructive sleep apnea)  CBC Auto Differential    Comprehensive Metabolic Panel    Lipid Panel    TSH without Reflex    Uric Acid    EKG 12 Lead   3. Morbid obesity with BMI of 60.0-69.9, adult ([facility])  CBC Auto Differential    Comprehensive Metabolic Panel    Lipid Panel    TSH without Reflex    Uric Acid    EKG 12 Lead   4. Essential hypertension  CBC Auto Differential    Comprehensive Metabolic Panel    Lipid Panel    TSH without Reflex    Uric Acid    EKG 12 Lead   5. PCOS (polycystic ovarian syndrome)     6. History of endometrial cancer         Plan:      Patient would like to do Decision Free 3+2 diet. Type of shake is . Advised patient on consistently getting in minimum food prescription and practicing \"More is Better\" to stay full. Instructed patient to order minimum food prescription weekly. Advised on hydration of 64oz of non-caloric fluid minimum daily. Encouraged plain water and caffeine only in moderation if at all. Avoid alcohol. Level of medical supervision is High Level. Follow-up appts and class schedule reviewed. Consent signed.        [x] Order EKG

## 2019-07-01 ENCOUNTER — TELEPHONE (OUTPATIENT)
Dept: PRIMARY CARE CLINIC | Age: 54
End: 2019-07-01

## 2019-07-01 ENCOUNTER — HOSPITAL ENCOUNTER (OUTPATIENT)
Dept: VASCULAR LAB | Age: 54
Discharge: HOME | End: 2019-07-01
Payer: COMMERCIAL

## 2019-07-01 PROCEDURE — 93923 UPR/LXTR ART STDY 3+ LVLS: CPT

## 2019-07-09 ENCOUNTER — HOSPITAL ENCOUNTER (OUTPATIENT)
Age: 54
Discharge: HOME | End: 2019-07-09
Payer: COMMERCIAL

## 2019-07-09 DIAGNOSIS — E11.9 TYPE 2 DIABETES MELLITUS WITHOUT COMPLICATION, WITH LONG-TERM CURRENT USE OF INSULIN (HCC): ICD-10-CM

## 2019-07-09 DIAGNOSIS — Z79.4 TYPE 2 DIABETES MELLITUS WITHOUT COMPLICATION, WITH LONG-TERM CURRENT USE OF INSULIN (HCC): ICD-10-CM

## 2019-07-09 DIAGNOSIS — G47.33 OSA (OBSTRUCTIVE SLEEP APNEA): Chronic | ICD-10-CM

## 2019-07-09 DIAGNOSIS — I10 ESSENTIAL HYPERTENSION: ICD-10-CM

## 2019-07-09 DIAGNOSIS — E66.01 MORBID OBESITY WITH BMI OF 60.0-69.9, ADULT (HCC): ICD-10-CM

## 2019-07-09 LAB
-: NORMAL
ALBUMIN SERPL-MCNC: 4 G/DL (ref 3.5–5.2)
ALBUMIN/GLOBULIN RATIO: ABNORMAL (ref 1–2.5)
ALP BLD-CCNC: 101 U/L (ref 35–104)
ALT SERPL-CCNC: 45 U/L (ref 5–33)
ANION GAP SERPL CALCULATED.3IONS-SCNC: 16 MMOL/L (ref 9–17)
AST SERPL-CCNC: 32 U/L
BILIRUB SERPL-MCNC: 0.19 MG/DL (ref 0.3–1.2)
BUN BLDV-MCNC: 12 MG/DL (ref 6–20)
BUN/CREAT BLD: ABNORMAL (ref 9–20)
CALCIUM SERPL-MCNC: 9.3 MG/DL (ref 8.6–10.4)
CHLORIDE BLD-SCNC: 98 MMOL/L (ref 98–107)
CHOLESTEROL/HDL RATIO: 4.5
CHOLESTEROL: 220 MG/DL
CO2: 24 MMOL/L (ref 20–31)
CREAT SERPL-MCNC: 0.66 MG/DL (ref 0.5–0.9)
GFR AFRICAN AMERICAN: >60 ML/MIN
GFR NON-AFRICAN AMERICAN: >60 ML/MIN
GFR SERPL CREATININE-BSD FRML MDRD: ABNORMAL ML/MIN/{1.73_M2}
GFR SERPL CREATININE-BSD FRML MDRD: ABNORMAL ML/MIN/{1.73_M2}
GLUCOSE BLD-MCNC: 180 MG/DL (ref 70–99)
HDLC SERPL-MCNC: 49 MG/DL
LDL CHOLESTEROL: 118 MG/DL (ref 0–130)
POTASSIUM SERPL-SCNC: 4.5 MMOL/L (ref 3.7–5.3)
REASON FOR REJECTION: NORMAL
SODIUM BLD-SCNC: 138 MMOL/L (ref 135–144)
TOTAL PROTEIN: 7.1 G/DL (ref 6.4–8.3)
TRIGL SERPL-MCNC: 264 MG/DL
TSH SERPL DL<=0.05 MIU/L-ACNC: 0.8 MIU/L (ref 0.3–5)
URIC ACID: 6.4 MG/DL (ref 2.4–5.7)
VLDLC SERPL CALC-MCNC: ABNORMAL MG/DL (ref 1–30)
ZZ NTE CLEAN UP: ORDERED TEST: NORMAL
ZZ NTE WITH NAME CLEAN UP: SPECIMEN SOURCE: NORMAL

## 2019-07-09 PROCEDURE — 93005 ELECTROCARDIOGRAM TRACING: CPT

## 2019-07-09 PROCEDURE — 80061 LIPID PANEL: CPT

## 2019-07-09 PROCEDURE — 80053 COMPREHEN METABOLIC PANEL: CPT

## 2019-07-09 PROCEDURE — 84550 ASSAY OF BLOOD/URIC ACID: CPT

## 2019-07-09 PROCEDURE — 84443 ASSAY THYROID STIM HORMONE: CPT

## 2019-07-09 PROCEDURE — 36415 COLL VENOUS BLD VENIPUNCTURE: CPT

## 2019-07-10 ENCOUNTER — TELEPHONE (OUTPATIENT)
Dept: BARIATRICS/WEIGHT MGMT | Age: 54
End: 2019-07-10

## 2019-07-10 LAB
EKG ATRIAL RATE: 92 BPM
EKG P AXIS: 39 DEGREES
EKG P-R INTERVAL: 162 MS
EKG Q-T INTERVAL: 364 MS
EKG QRS DURATION: 92 MS
EKG QTC CALCULATION (BAZETT): 450 MS
EKG R AXIS: 0 DEGREES
EKG T AXIS: 24 DEGREES
EKG VENTRICULAR RATE: 92 BPM

## 2019-07-10 PROCEDURE — 93010 ELECTROCARDIOGRAM REPORT: CPT | Performed by: INTERNAL MEDICINE

## 2019-07-16 DIAGNOSIS — E11.9 NEW ONSET TYPE 2 DIABETES MELLITUS (HCC): Chronic | ICD-10-CM

## 2019-07-16 DIAGNOSIS — Z79.4 TYPE 2 DIABETES MELLITUS WITHOUT COMPLICATION, WITH LONG-TERM CURRENT USE OF INSULIN (HCC): ICD-10-CM

## 2019-07-16 DIAGNOSIS — E11.9 TYPE 2 DIABETES MELLITUS WITHOUT COMPLICATION, WITH LONG-TERM CURRENT USE OF INSULIN (HCC): ICD-10-CM

## 2019-07-17 ENCOUNTER — CLINICAL DOCUMENTATION (OUTPATIENT)
Dept: PODIATRY | Age: 54
End: 2019-07-17

## 2019-07-17 ENCOUNTER — OFFICE VISIT (OUTPATIENT)
Dept: BARIATRICS/WEIGHT MGMT | Age: 54
End: 2019-07-17
Payer: COMMERCIAL

## 2019-07-17 VITALS
BODY MASS INDEX: 50.02 KG/M2 | HEIGHT: 64 IN | SYSTOLIC BLOOD PRESSURE: 120 MMHG | WEIGHT: 293 LBS | HEART RATE: 80 BPM | DIASTOLIC BLOOD PRESSURE: 80 MMHG

## 2019-07-17 DIAGNOSIS — Z79.4 TYPE 2 DIABETES MELLITUS WITHOUT COMPLICATION, WITH LONG-TERM CURRENT USE OF INSULIN (HCC): Primary | ICD-10-CM

## 2019-07-17 DIAGNOSIS — E11.9 TYPE 2 DIABETES MELLITUS WITHOUT COMPLICATION, WITH LONG-TERM CURRENT USE OF INSULIN (HCC): Primary | ICD-10-CM

## 2019-07-17 DIAGNOSIS — E28.2 PCOS (POLYCYSTIC OVARIAN SYNDROME): ICD-10-CM

## 2019-07-17 DIAGNOSIS — I10 ESSENTIAL HYPERTENSION: ICD-10-CM

## 2019-07-17 DIAGNOSIS — Z85.42 HISTORY OF ENDOMETRIAL CANCER: ICD-10-CM

## 2019-07-17 DIAGNOSIS — E66.01 MORBID OBESITY WITH BMI OF 60.0-69.9, ADULT (HCC): ICD-10-CM

## 2019-07-17 DIAGNOSIS — G47.33 OSA (OBSTRUCTIVE SLEEP APNEA): Chronic | ICD-10-CM

## 2019-07-17 PROCEDURE — 99213 OFFICE O/P EST LOW 20 MIN: CPT | Performed by: NURSE PRACTITIONER

## 2019-07-17 NOTE — PROGRESS NOTES
Medically Supervised Weight Loss Follow-up Progress Note      Subjective     Patient is here for weight management program to follow-up for the chronic conditions of Type 2 DM, HTN, PCOS, KIANA, Hx Endometrial Cancer. Patient continues on Decision Free 3+2 diet plan. Consistently getting in minimum food script and fluids and HMR multivitamin daily. Physical activity includes walking. Total weight loss to date is 6 lbs. Diabetes regimen includes Trulicity, Levemir Insulin 55 units at HS, and metformin. FBS running 158-197. Last A1C 9.9%. No current issues. Allergies: Allergies   Allergen Reactions    Latex Itching and Rash    Penicillins Anaphylaxis and Other (See Comments)     Passes out, all penicillins        Past Medical History:     Past Medical History:   Diagnosis Date    Acute left-sided low back pain with left-sided sciatica     Arthritis     BMI 70 and over, adult ([address]     Cancer ([facility] [address] 2013    Diabetes mellitus ([facility] [address]     High blood pressure     Hypertension     Sleep apnea    .     Past Surgical History:  Past Surgical History:   Procedure Laterality Date     SECTION      HYSTERECTOMY, TOTAL ABDOMINAL      OTHER SURGICAL HISTORY      [id number] multiple procedures to remove cancer       Family History:  Family History   Problem Relation Age of Onset    Cancer Mother         thyroid    Diabetes Mother     Anemia Other     Cancer Other     Breast Cancer Other     Diabetes Other     High Blood Pressure Other     Heart Disease Other     Hypertension Father        Social History:  Social History     Socioeconomic History    Marital status:      Spouse name: Not on file    Number of children: Not on file    Years of education: Not on file    Highest education level: Not on file   Occupational History    Not on file   Social Needs    Financial resource strain: Not on file    Food insecurity:     Worry: Not on file     Inability: Not on file   [place] Soft, nontender. Nondistended. Musculoskeletal: Movement x4. Bilateral lower extremity edema. Neurological: Gait normal. Alert and oriented to person, place, and time. Psychiatric: Normal mood and affect. Speech and behavior normal. Judgment and thought content normal. Cognition and memory intact. Assessment:       Diagnosis Orders   1. Type 2 diabetes mellitus without complication, with long-term current use of insulin ([facility])  Comprehensive Metabolic Panel    CBC Auto Differential   2. Essential hypertension  Comprehensive Metabolic Panel    CBC Auto Differential   3. PCOS (polycystic ovarian syndrome)  Comprehensive Metabolic Panel    CBC Auto Differential   4. History of endometrial cancer  Comprehensive Metabolic Panel    CBC Auto Differential   5. KIANA (obstructive sleep apnea)  Comprehensive Metabolic Panel    CBC Auto Differential   6. Morbid obesity with BMI of 60.0-69.9, adult ([facility])  Comprehensive Metabolic Panel    CBC Auto Differential       Plan:      Vital signs reviewed  Blood sugar logs reviewed. Stable. Plan:  Continue current regimen. Lab work per [facility] protocol  Return to clinic as scheduled per [facility] protocol. Follow up:  Return in about 1 week (around 7/24/2019). This encounters orders:  Orders Placed This Encounter   Procedures    Comprehensive Metabolic Panel     Standing Status:   Future     Standing Expiration Date:   7/16/2020    CBC Auto Differential     Standing Status:   Future     Standing Expiration Date:   7/16/2020       This encounters prescriptions:  No orders of the defined types were placed in this encounter.       Electronically signed by:  Chuck Egan CNP

## 2019-07-21 ENCOUNTER — HOSPITAL ENCOUNTER (OUTPATIENT)
Age: 54
Discharge: HOME | End: 2019-07-21
Payer: COMMERCIAL

## 2019-07-21 DIAGNOSIS — G47.33 OSA (OBSTRUCTIVE SLEEP APNEA): Chronic | ICD-10-CM

## 2019-07-21 DIAGNOSIS — E11.9 TYPE 2 DIABETES MELLITUS WITHOUT COMPLICATION, WITH LONG-TERM CURRENT USE OF INSULIN (HCC): ICD-10-CM

## 2019-07-21 DIAGNOSIS — Z79.4 TYPE 2 DIABETES MELLITUS WITHOUT COMPLICATION, WITH LONG-TERM CURRENT USE OF INSULIN (HCC): ICD-10-CM

## 2019-07-21 DIAGNOSIS — E28.2 PCOS (POLYCYSTIC OVARIAN SYNDROME): ICD-10-CM

## 2019-07-21 DIAGNOSIS — E66.01 MORBID OBESITY WITH BMI OF 60.0-69.9, ADULT (HCC): ICD-10-CM

## 2019-07-21 DIAGNOSIS — Z85.42 HISTORY OF ENDOMETRIAL CANCER: ICD-10-CM

## 2019-07-21 DIAGNOSIS — I10 ESSENTIAL HYPERTENSION: ICD-10-CM

## 2019-07-21 PROCEDURE — 85025 COMPLETE CBC W/AUTO DIFF WBC: CPT

## 2019-07-21 PROCEDURE — 80053 COMPREHEN METABOLIC PANEL: CPT

## 2019-07-21 PROCEDURE — 36415 COLL VENOUS BLD VENIPUNCTURE: CPT

## 2019-07-22 LAB
ABSOLUTE EOS #: 0.15 K/UL (ref 0–0.44)
ABSOLUTE IMMATURE GRANULOCYTE: 0.06 K/UL (ref 0–0.3)
ABSOLUTE LYMPH #: 1.73 K/UL (ref 1.1–3.7)
ABSOLUTE MONO #: 0.85 K/UL (ref 0.1–1.2)
ALBUMIN SERPL-MCNC: 4.3 G/DL (ref 3.5–5.2)
ALBUMIN/GLOBULIN RATIO: 1.2 (ref 1–2.5)
ALP BLD-CCNC: 107 U/L (ref 35–104)
ALT SERPL-CCNC: 55 U/L (ref 5–33)
ANION GAP SERPL CALCULATED.3IONS-SCNC: 22 MMOL/L (ref 9–17)
AST SERPL-CCNC: 36 U/L
BASOPHILS # BLD: 1 % (ref 0–2)
BASOPHILS ABSOLUTE: 0.06 K/UL (ref 0–0.2)
BILIRUB SERPL-MCNC: 0.25 MG/DL (ref 0.3–1.2)
BUN BLDV-MCNC: 12 MG/DL (ref 6–20)
BUN/CREAT BLD: ABNORMAL (ref 9–20)
CALCIUM SERPL-MCNC: 10.1 MG/DL (ref 8.6–10.4)
CHLORIDE BLD-SCNC: 96 MMOL/L (ref 98–107)
CO2: 22 MMOL/L (ref 20–31)
CREAT SERPL-MCNC: 0.87 MG/DL (ref 0.5–0.9)
DIFFERENTIAL TYPE: ABNORMAL
EOSINOPHILS RELATIVE PERCENT: 2 % (ref 1–4)
GFR AFRICAN AMERICAN: >60 ML/MIN
GFR NON-AFRICAN AMERICAN: >60 ML/MIN
GFR SERPL CREATININE-BSD FRML MDRD: ABNORMAL ML/MIN/{1.73_M2}
GFR SERPL CREATININE-BSD FRML MDRD: ABNORMAL ML/MIN/{1.73_M2}
GLUCOSE BLD-MCNC: 169 MG/DL (ref 70–99)
HCT VFR BLD CALC: 42.1 % (ref 36.3–47.1)
HEMOGLOBIN: 13.5 G/DL (ref 11.9–15.1)
IMMATURE GRANULOCYTES: 1 %
LYMPHOCYTES # BLD: 18 % (ref 24–43)
MCH RBC QN AUTO: 31.1 PG (ref 25.2–33.5)
MCHC RBC AUTO-ENTMCNC: 32.1 G/DL (ref 28.4–34.8)
MCV RBC AUTO: 97 FL (ref 82.6–102.9)
MONOCYTES # BLD: 9 % (ref 3–12)
NRBC AUTOMATED: 0 PER 100 WBC
PDW BLD-RTO: 13.3 % (ref 11.8–14.4)
PLATELET # BLD: 315 K/UL (ref 138–453)
PLATELET ESTIMATE: ABNORMAL
PMV BLD AUTO: 10.7 FL (ref 8.1–13.5)
POTASSIUM SERPL-SCNC: 4 MMOL/L (ref 3.7–5.3)
RBC # BLD: 4.34 M/UL (ref 3.95–5.11)
RBC # BLD: ABNORMAL 10*6/UL
SEG NEUTROPHILS: 69 % (ref 36–65)
SEGMENTED NEUTROPHILS ABSOLUTE COUNT: 6.56 K/UL (ref 1.5–8.1)
SODIUM BLD-SCNC: 140 MMOL/L (ref 135–144)
TOTAL PROTEIN: 7.8 G/DL (ref 6.4–8.3)
WBC # BLD: 9.4 K/UL (ref 3.5–11.3)
WBC # BLD: ABNORMAL 10*3/UL

## 2019-07-24 ENCOUNTER — OFFICE VISIT (OUTPATIENT)
Dept: BARIATRICS/WEIGHT MGMT | Age: 54
End: 2019-07-24
Payer: COMMERCIAL

## 2019-07-24 VITALS
WEIGHT: 293 LBS | HEART RATE: 64 BPM | DIASTOLIC BLOOD PRESSURE: 68 MMHG | HEIGHT: 63 IN | BODY MASS INDEX: 51.91 KG/M2 | SYSTOLIC BLOOD PRESSURE: 120 MMHG

## 2019-07-24 DIAGNOSIS — I10 ESSENTIAL HYPERTENSION: ICD-10-CM

## 2019-07-24 DIAGNOSIS — E28.2 PCOS (POLYCYSTIC OVARIAN SYNDROME): ICD-10-CM

## 2019-07-24 DIAGNOSIS — Z79.4 TYPE 2 DIABETES MELLITUS WITHOUT COMPLICATION, WITH LONG-TERM CURRENT USE OF INSULIN (HCC): Primary | ICD-10-CM

## 2019-07-24 DIAGNOSIS — E11.9 TYPE 2 DIABETES MELLITUS WITHOUT COMPLICATION, WITH LONG-TERM CURRENT USE OF INSULIN (HCC): Primary | ICD-10-CM

## 2019-07-24 DIAGNOSIS — G47.33 OSA (OBSTRUCTIVE SLEEP APNEA): Chronic | ICD-10-CM

## 2019-07-24 DIAGNOSIS — E66.01 MORBID OBESITY WITH BMI OF 60.0-69.9, ADULT (HCC): ICD-10-CM

## 2019-07-24 DIAGNOSIS — Z85.42 HISTORY OF ENDOMETRIAL CANCER: ICD-10-CM

## 2019-07-24 PROCEDURE — 99213 OFFICE O/P EST LOW 20 MIN: CPT | Performed by: NURSE PRACTITIONER

## 2019-07-24 NOTE — PROGRESS NOTES
 Transportation needs:     Medical: Not on file     Non-medical: Not on file   Tobacco Use    Smoking status: Never Smoker    Smokeless tobacco: Never Used   Substance and Sexual Activity    Alcohol use: No    Drug use: No    Sexual activity: Not on file   Lifestyle    Physical activity:     Days per week: Not on file     Minutes per session: Not on file    Stress: Not on file   Relationships    Social connections:     Talks on phone: Not on file     Gets together: Not on file     Attends Holiness service: Not on file     Active member of club or organization: Not on file     Attends meetings of clubs or organizations: Not on file     Relationship status: Not on file    Intimate partner violence:     Fear of current or ex partner: Not on file     Emotionally abused: Not on file     Physically abused: Not on file     Forced sexual activity: Not on file   Other Topics Concern    Not on file   Social History Narrative    Not on file       Current Medications:  Current Outpatient Medications   Medication Sig Dispense Refill    insulin detemir (LEVEMIR FLEXTOUCH) 100 UNIT/ML injection pen Inject 55 Units into the skin nightly 50 mL 0    ciclopirox (PENLAC) 8 % solution Apply topically nightly. Remove once weekly with alcohol or nail polish remover.  1 Bottle 3    ibuprofen (ADVIL;MOTRIN) 800 MG tablet Take 1 tablet by mouth 3 times daily (with meals) 60 tablet 0    Dulaglutide 1.5 MG/0.5ML SOPN Inject 1.5 mg into the skin once a week 12 pen 3    Saline (SIMPLY SALINE) 0.9 % AERS 2 sprays by Nasal route 4 times daily  0    Lancets MISC Test BID and on insulin 100 each 11    blood glucose test strips (ASCENSIA AUTODISC VI;ONE TOUCH ULTRA TEST VI) strip Test BID  On insulin, type 2  strip 11    Insulin Pen Needle (B-D ULTRAFINE III SHORT PEN) 31G X 8 MM MISC Inject 1 each into the skin daily May substitute with any brand 100 each 11    triamterene-hydrochlorothiazide (MAXZIDE) 75-50 MG per

## 2019-07-30 ENCOUNTER — OFFICE VISIT (OUTPATIENT)
Dept: PULMONOLOGY | Age: 54
End: 2019-07-30
Payer: COMMERCIAL

## 2019-07-30 VITALS
WEIGHT: 293 LBS | RESPIRATION RATE: 12 BRPM | BODY MASS INDEX: 51.91 KG/M2 | SYSTOLIC BLOOD PRESSURE: 125 MMHG | HEIGHT: 63 IN | OXYGEN SATURATION: 98 % | HEART RATE: 98 BPM | DIASTOLIC BLOOD PRESSURE: 82 MMHG

## 2019-07-30 DIAGNOSIS — I10 ESSENTIAL HYPERTENSION: ICD-10-CM

## 2019-07-30 DIAGNOSIS — Z79.4 TYPE 2 DIABETES MELLITUS WITHOUT COMPLICATION, WITH LONG-TERM CURRENT USE OF INSULIN (HCC): ICD-10-CM

## 2019-07-30 DIAGNOSIS — E11.9 TYPE 2 DIABETES MELLITUS WITHOUT COMPLICATION, WITH LONG-TERM CURRENT USE OF INSULIN (HCC): ICD-10-CM

## 2019-07-30 DIAGNOSIS — G47.33 OSA TREATED WITH BIPAP: Primary | ICD-10-CM

## 2019-07-30 DIAGNOSIS — E66.01 CLASS 3 SEVERE OBESITY DUE TO EXCESS CALORIES WITH SERIOUS COMORBIDITY AND BODY MASS INDEX (BMI) OF 60.0 TO 69.9 IN ADULT (HCC): ICD-10-CM

## 2019-07-30 PROCEDURE — 99203 OFFICE O/P NEW LOW 30 MIN: CPT | Performed by: INTERNAL MEDICINE

## 2019-07-30 ASSESSMENT — ENCOUNTER SYMPTOMS
RESPIRATORY NEGATIVE: 1
EYES NEGATIVE: 1
GASTROINTESTINAL NEGATIVE: 1

## 2019-07-31 ENCOUNTER — OFFICE VISIT (OUTPATIENT)
Dept: BARIATRICS/WEIGHT MGMT | Age: 54
End: 2019-07-31
Payer: COMMERCIAL

## 2019-07-31 ENCOUNTER — TELEPHONE (OUTPATIENT)
Dept: PULMONOLOGY | Age: 54
End: 2019-07-31

## 2019-07-31 VITALS
HEIGHT: 63 IN | BODY MASS INDEX: 51.91 KG/M2 | DIASTOLIC BLOOD PRESSURE: 82 MMHG | RESPIRATION RATE: 20 BRPM | HEART RATE: 84 BPM | SYSTOLIC BLOOD PRESSURE: 136 MMHG | WEIGHT: 293 LBS

## 2019-07-31 DIAGNOSIS — Z85.42 HISTORY OF ENDOMETRIAL CANCER: ICD-10-CM

## 2019-07-31 DIAGNOSIS — G47.33 OSA (OBSTRUCTIVE SLEEP APNEA): Chronic | ICD-10-CM

## 2019-07-31 DIAGNOSIS — E11.9 TYPE 2 DIABETES MELLITUS WITHOUT COMPLICATION, WITH LONG-TERM CURRENT USE OF INSULIN (HCC): Primary | ICD-10-CM

## 2019-07-31 DIAGNOSIS — Z79.4 TYPE 2 DIABETES MELLITUS WITHOUT COMPLICATION, WITH LONG-TERM CURRENT USE OF INSULIN (HCC): Primary | ICD-10-CM

## 2019-07-31 DIAGNOSIS — E66.01 MORBID OBESITY WITH BMI OF 60.0-69.9, ADULT (HCC): ICD-10-CM

## 2019-07-31 DIAGNOSIS — E28.2 PCOS (POLYCYSTIC OVARIAN SYNDROME): ICD-10-CM

## 2019-07-31 DIAGNOSIS — I10 ESSENTIAL HYPERTENSION: ICD-10-CM

## 2019-07-31 PROCEDURE — 99213 OFFICE O/P EST LOW 20 MIN: CPT | Performed by: NURSE PRACTITIONER

## 2019-07-31 NOTE — PROGRESS NOTES
Medically Supervised Weight Loss Follow-up Progress Note      Subjective     Patient is here for weight management program to follow-up for the chronic conditions of Type 2 DM, HTN, PCOS, KIANA, Hx Endometrial Cancer. Patient continues on Decision Free 3+2 diet plan. Was on vacation and hosted a family reunion and did not follow the diet. Did not check her blood sugars. Gained weight. Back on track. Total weight loss to date is 8 lbs. Diabetes regimen includes Trulicity, Levemir Insulin 55 units at HS, and metformin. FBS this am was 138. Last A1C 9.9%. No current issues. Allergies: Allergies   Allergen Reactions    Latex Itching and Rash    Penicillins Anaphylaxis and Other (See Comments)     Passes out, all penicillins        Past Medical History:     Past Medical History:   Diagnosis Date    Acute left-sided low back pain with left-sided sciatica     Arthritis     BMI 70 and over, adult ([facility]     Cancer ([facility] 2013    Diabetes mellitus ([facility]     High blood pressure     Hypertension     Sleep apnea    .     Past Surgical History:  Past Surgical History:   Procedure Laterality Date     SECTION      HYSTERECTOMY, TOTAL ABDOMINAL      OTHER SURGICAL HISTORY      [id number] multiple procedures to remove cancer       Family History:  Family History   Problem Relation Age of Onset    Cancer Mother         thyroid    Diabetes Mother     Anemia Other     Cancer Other     Breast Cancer Other     Diabetes Other     High Blood Pressure Other     Heart Disease Other     Hypertension Father        Social History:  Social History     Socioeconomic History    Marital status:      Spouse name: Not on file    Number of children: Not on file    Years of education: Not on file    Highest education level: Not on file   Occupational History    Not on file   Social Needs    Financial resource strain: Not on file    Food insecurity:     Worry: Not on file     Inability: Not on file  Transportation needs:     Medical: Not on file     Non-medical: Not on file   Tobacco Use    Smoking status: Never Smoker    Smokeless tobacco: Never Used   Substance and Sexual Activity    Alcohol use: No    Drug use: No    Sexual activity: Not on file   Lifestyle    Physical activity:     Days per week: Not on file     Minutes per session: Not on file    Stress: Not on file   Relationships    Social connections:     Talks on phone: Not on file     Gets together: Not on file     Attends Muslim service: Not on file     Active member of club or organization: Not on file     Attends meetings of clubs or organizations: Not on file     Relationship status: Not on file    Intimate partner violence:     Fear of current or ex partner: Not on file     Emotionally abused: Not on file     Physically abused: Not on file     Forced sexual activity: Not on file   Other Topics Concern    Not on file   Social History Narrative    Not on file       Current Medications:  Current Outpatient Medications   Medication Sig Dispense Refill    insulin detemir (LEVEMIR FLEXTOUCH) 100 UNIT/ML injection pen Inject 55 Units into the skin nightly 50 mL 0    ciclopirox (PENLAC) 8 % solution Apply topically nightly. Remove once weekly with alcohol or nail polish remover.  1 Bottle 3    ibuprofen (ADVIL;MOTRIN) 800 MG tablet Take 1 tablet by mouth 3 times daily (with meals) 60 tablet 0    Dulaglutide 1.5 MG/0.5ML SOPN Inject 1.5 mg into the skin once a week 12 pen 3    Saline (SIMPLY SALINE) 0.9 % AERS 2 sprays by Nasal route 4 times daily  0    Lancets MISC Test BID and on insulin 100 each 11    blood glucose test strips (ASCENSIA AUTODISC VI;ONE TOUCH ULTRA TEST VI) strip Test BID  On insulin, type 2  strip 11    Insulin Pen Needle (B-D ULTRAFINE III SHORT PEN) 31G X 8 MM MISC Inject 1 each into the skin daily May substitute with any brand 100 each 11    triamterene-hydrochlorothiazide (MAXZIDE) 75-50 MG per tablet Take 1 tablet by mouth daily 90 tablet 3    metFORMIN (GLUCOPHAGE) 500 MG tablet Take 1 tablet by mouth 2 times daily (with meals) 180 tablet 3    meloxicam (MOBIC) 15 MG tablet Take 1 tablet by mouth daily 90 tablet 3     No current facility-administered medications for this visit. Vital Signs:  /82 (Site: Right Upper Arm, Position: Sitting, Cuff Size: Large Adult)   Pulse 84   Resp 20   Ht 5' 2.99\" (1.6 m)   Wt (!) 391 lb (177.4 kg)   BMI 69.28 kg/m²     BMI/Height/Weight:  Body mass index is 69.28 kg/m². Review of Systems - Review of systems was performed. All were negative unless otherwise documented in HPI. Constitutional: Negative for fever, chills and diaphoresis. HENT: Negative for hearing loss and trouble swallowing. Eyes: Negative for photophobia and visual disturbance. Respiratory: Negative for cough, shortness of breath and wheezing. Cardiovascular: Negative for chest pain and palpitations. Gastrointestinal: Negative for nausea, vomiting, abdominal pain, diarrhea, constipation, blood in stool and abdominal distention. Endocrine: Negative for polydipsia, polyphagia and polyuria. Genitourinary: Negative for dysuria, frequency, hematuria and difficulty urinating. Musculoskeletal: Negative for myalgias, joint swelling. Skin: Negative for pallor and rash. Neurological: Negative for dizziness, tremors, light-headedness and headaches. Psychiatric/Behavioral: Negative for sleep disturbance and dysphoric mood. Objective:      Physical Exam   Vital signs reviewed. General: Well-developed and well-nourished. No acute distress. Skin: Warm, dry and intact. HEENT: Normocephalic. EOMs intact. Conjunctivae normal. Neck supple. Cardiovascular: Normal rate, regular rhythm. Pulmonary/Chest: Normal effort. Lungs clear to auscultation. No rales, rhonchi or wheezing. Abdominal: Positive bowel sounds. Soft, nontender. Nondistended.    Musculoskeletal: Movement x4. Bilateral lower extremity edema. Neurological: Gait normal. Alert and oriented to person, place, and time. Psychiatric: Normal mood and affect. Speech and behavior normal. Judgment and thought content normal. Cognition and memory intact. Assessment:       Diagnosis Orders   1. Type 2 diabetes mellitus without complication, with long-term current use of insulin ([facility]     2. Essential hypertension     3. PCOS (polycystic ovarian syndrome)     4. History of endometrial cancer     5. KIANA (obstructive sleep apnea)     6. Morbid obesity with BMI of 60.0-69.9, adult ([facility]         Plan:      Vital signs reviewed  Did not bring blood sugar logs. Blank logs given to her and encouraged her to be compliant with SMBG for next appt. Plan:  Continue current regimen. Back on track. Return to clinic as scheduled per HMR protocol. Follow up:  Return in about 1 week (around 8/7/2019). This encounters orders:  No orders of the defined types were placed in this encounter. This encounters prescriptions:  No orders of the defined types were placed in this encounter.       Electronically signed by:  Nelly Robert CNP

## 2019-08-26 ENCOUNTER — OFFICE VISIT (OUTPATIENT)
Dept: PRIMARY CARE CLINIC | Age: 54
End: 2019-08-26
Payer: COMMERCIAL

## 2019-08-26 VITALS
SYSTOLIC BLOOD PRESSURE: 134 MMHG | BODY MASS INDEX: 68.32 KG/M2 | WEIGHT: 293 LBS | OXYGEN SATURATION: 97 % | HEART RATE: 99 BPM | DIASTOLIC BLOOD PRESSURE: 88 MMHG

## 2019-08-26 DIAGNOSIS — E11.9 NEW ONSET TYPE 2 DIABETES MELLITUS (HCC): Primary | ICD-10-CM

## 2019-08-26 DIAGNOSIS — I10 ESSENTIAL HYPERTENSION: ICD-10-CM

## 2019-08-26 DIAGNOSIS — E11.9 TYPE 2 DIABETES MELLITUS WITHOUT COMPLICATION, WITH LONG-TERM CURRENT USE OF INSULIN (HCC): ICD-10-CM

## 2019-08-26 DIAGNOSIS — Z79.4 TYPE 2 DIABETES MELLITUS WITHOUT COMPLICATION, WITH LONG-TERM CURRENT USE OF INSULIN (HCC): ICD-10-CM

## 2019-08-26 DIAGNOSIS — Z23 NEEDS FLU SHOT: ICD-10-CM

## 2019-08-26 DIAGNOSIS — G47.33 OSA (OBSTRUCTIVE SLEEP APNEA): ICD-10-CM

## 2019-08-26 LAB — HBA1C MFR BLD: 7.8 %

## 2019-08-26 PROCEDURE — 99214 OFFICE O/P EST MOD 30 MIN: CPT | Performed by: FAMILY MEDICINE

## 2019-08-26 PROCEDURE — 90471 IMMUNIZATION ADMIN: CPT | Performed by: FAMILY MEDICINE

## 2019-08-26 PROCEDURE — 83036 HEMOGLOBIN GLYCOSYLATED A1C: CPT | Performed by: FAMILY MEDICINE

## 2019-08-26 PROCEDURE — 90686 IIV4 VACC NO PRSV 0.5 ML IM: CPT | Performed by: FAMILY MEDICINE

## 2019-08-28 ENCOUNTER — OFFICE VISIT (OUTPATIENT)
Dept: BARIATRICS/WEIGHT MGMT | Age: 54
End: 2019-08-28
Payer: COMMERCIAL

## 2019-08-28 VITALS
HEART RATE: 78 BPM | HEIGHT: 63 IN | RESPIRATION RATE: 18 BRPM | SYSTOLIC BLOOD PRESSURE: 128 MMHG | WEIGHT: 293 LBS | DIASTOLIC BLOOD PRESSURE: 74 MMHG | BODY MASS INDEX: 51.91 KG/M2

## 2019-08-28 DIAGNOSIS — Z79.4 TYPE 2 DIABETES MELLITUS WITHOUT COMPLICATION, WITH LONG-TERM CURRENT USE OF INSULIN (HCC): Primary | ICD-10-CM

## 2019-08-28 DIAGNOSIS — G47.33 OSA (OBSTRUCTIVE SLEEP APNEA): Chronic | ICD-10-CM

## 2019-08-28 DIAGNOSIS — E66.01 MORBID OBESITY WITH BMI OF 60.0-69.9, ADULT (HCC): ICD-10-CM

## 2019-08-28 DIAGNOSIS — R74.8 ELEVATED LIVER ENZYMES: ICD-10-CM

## 2019-08-28 DIAGNOSIS — Z85.42 HISTORY OF ENDOMETRIAL CANCER: ICD-10-CM

## 2019-08-28 DIAGNOSIS — I10 ESSENTIAL HYPERTENSION: ICD-10-CM

## 2019-08-28 DIAGNOSIS — E28.2 PCOS (POLYCYSTIC OVARIAN SYNDROME): ICD-10-CM

## 2019-08-28 DIAGNOSIS — E11.9 TYPE 2 DIABETES MELLITUS WITHOUT COMPLICATION, WITH LONG-TERM CURRENT USE OF INSULIN (HCC): Primary | ICD-10-CM

## 2019-08-28 PROCEDURE — 99213 OFFICE O/P EST LOW 20 MIN: CPT | Performed by: NURSE PRACTITIONER

## 2019-08-28 NOTE — PROGRESS NOTES
Medically Supervised Weight Loss Follow-up Progress Note      Subjective     Patient is here for weight management program to follow-up for the chronic conditions of Type 2 DM, HTN, PCOS, KIANA, Hx Endometrial Cancer. Patient continues on Decision Free 3+2 diet plan. She has been using the HMR shakes, but eating grocery store food instead of HMR entrees. Only able to tolerate 2 types of entrees. She would like to eat vegetables and fruit. Total weight loss to date is 20 lbs. Diabetes regimen includes Trulicity, Levemir Insulin, and metformin. She has decreased her insulin to 27 units. Blood sugars running in low 100's. No hypoglycemia. A1C down from 9.9% to 7.8%. No current issues. Allergies: Allergies   Allergen Reactions    Latex Itching and Rash    Penicillins Anaphylaxis and Other (See Comments)     Passes out, all penicillins        Past Medical History:     Past Medical History:   Diagnosis Date    Acute left-sided low back pain with left-sided sciatica     Arthritis     BMI 70 and over, adult ([facility]     Cancer ([facility]     Diabetes mellitus ([facility]     High blood pressure     Hypertension     Sleep apnea    .     Past Surgical History:  Past Surgical History:   Procedure Laterality Date     SECTION      HYSTERECTOMY, TOTAL ABDOMINAL      OTHER SURGICAL HISTORY      [id number] multiple procedures to remove cancer       Family History:  Family History   Problem Relation Age of Onset    Cancer Mother         thyroid    Diabetes Mother     Anemia Other     Cancer Other     Breast Cancer Other     Diabetes Other     High Blood Pressure Other     Heart Disease Other     Hypertension Father        Social History:  Social History     Socioeconomic History    Marital status:      Spouse name: Not on file    Number of children: Not on file    Years of education: Not on file    Highest education level: Not on file   Occupational History    Not on file   Social Needs    Financial resource strain: Not on file    Food insecurity:     Worry: Not on file     Inability: Not on file    Transportation needs:     Medical: Not on file     Non-medical: Not on file   Tobacco Use    Smoking status: Never Smoker    Smokeless tobacco: Never Used   Substance and Sexual Activity    Alcohol use: No    Drug use: No    Sexual activity: Not on file   Lifestyle    Physical activity:     Days per week: Not on file     Minutes per session: Not on file    Stress: Not on file   Relationships    Social connections:     Talks on phone: Not on file     Gets together: Not on file     Attends Rastafarian service: Not on file     Active member of club or organization: Not on file     Attends meetings of clubs or organizations: Not on file     Relationship status: Not on file    Intimate partner violence:     Fear of current or ex partner: Not on file     Emotionally abused: Not on file     Physically abused: Not on file     Forced sexual activity: Not on file   Other Topics Concern    Not on file   Social History Narrative    Not on file       Current Medications:  Current Outpatient Medications   Medication Sig Dispense Refill    insulin detemir (LEVEMIR FLEXTOUCH) 100 UNIT/ML injection pen Inject 55 Units into the skin nightly 50 mL 0    ciclopirox (PENLAC) 8 % solution Apply topically nightly. Remove once weekly with alcohol or nail polish remover.  (Patient not taking: Reported on 8/26/2019) 1 Bottle 3    ibuprofen (ADVIL;MOTRIN) 800 MG tablet Take 1 tablet by mouth 3 times daily (with meals) (Patient not taking: Reported on 8/26/2019) 60 tablet 0    Dulaglutide 1.5 MG/0.5ML SOPN Inject 1.5 mg into the skin once a week 12 pen 3    Saline (SIMPLY SALINE) 0.9 % AERS 2 sprays by Nasal route 4 times daily  0    Lancets MISC Test BID and on insulin 100 each 11    blood glucose test strips (ASCENSIA AUTODISC VI;ONE TOUCH ULTRA TEST VI) strip Test BID  On insulin, type 2  strip 11

## 2019-09-11 ENCOUNTER — TELEPHONE (OUTPATIENT)
Dept: BARIATRICS/WEIGHT MGMT | Age: 54
End: 2019-09-11

## 2019-09-18 DIAGNOSIS — M54.42 ACUTE LEFT-SIDED LOW BACK PAIN WITH LEFT-SIDED SCIATICA: ICD-10-CM

## 2019-09-18 RX ORDER — IBUPROFEN 800 MG/1
TABLET ORAL
Qty: 60 TABLET | Refills: 0 | Status: SHIPPED | OUTPATIENT
Start: 2019-09-18 | End: 2019-12-26

## 2019-10-05 DIAGNOSIS — Z79.4 TYPE 2 DIABETES MELLITUS WITHOUT COMPLICATION, WITH LONG-TERM CURRENT USE OF INSULIN (HCC): ICD-10-CM

## 2019-10-05 DIAGNOSIS — E11.9 NEW ONSET TYPE 2 DIABETES MELLITUS (HCC): Chronic | ICD-10-CM

## 2019-10-05 DIAGNOSIS — E11.9 TYPE 2 DIABETES MELLITUS WITHOUT COMPLICATION, WITH LONG-TERM CURRENT USE OF INSULIN (HCC): ICD-10-CM

## 2019-10-07 RX ORDER — INSULIN DETEMIR 100 [IU]/ML
INJECTION, SOLUTION SUBCUTANEOUS
Qty: 48 ML | Refills: 1 | Status: SHIPPED | OUTPATIENT
Start: 2019-10-07 | End: 2019-11-15 | Stop reason: SDUPTHER

## 2019-11-15 DIAGNOSIS — E11.9 TYPE 2 DIABETES MELLITUS WITHOUT COMPLICATION, WITHOUT LONG-TERM CURRENT USE OF INSULIN (HCC): ICD-10-CM

## 2019-11-15 DIAGNOSIS — I10 ESSENTIAL HYPERTENSION: ICD-10-CM

## 2019-11-15 RX ORDER — TRIAMTERENE AND HYDROCHLOROTHIAZIDE 75; 50 MG/1; MG/1
TABLET ORAL
Qty: 90 TABLET | Refills: 0 | Status: SHIPPED | OUTPATIENT
Start: 2019-11-15 | End: 2020-01-22

## 2019-11-20 DIAGNOSIS — E11.9 NEW ONSET TYPE 2 DIABETES MELLITUS (HCC): Chronic | ICD-10-CM

## 2019-12-10 ENCOUNTER — TELEPHONE (OUTPATIENT)
Dept: PRIMARY CARE CLINIC | Age: 54
End: 2019-12-10

## 2019-12-24 DIAGNOSIS — M54.42 ACUTE LEFT-SIDED LOW BACK PAIN WITH LEFT-SIDED SCIATICA: ICD-10-CM

## 2019-12-26 RX ORDER — IBUPROFEN 800 MG/1
TABLET ORAL
Qty: 60 TABLET | Refills: 0 | Status: SHIPPED | OUTPATIENT
Start: 2019-12-26 | End: 2020-01-24

## 2020-01-22 ENCOUNTER — OFFICE VISIT (OUTPATIENT)
Dept: PRIMARY CARE CLINIC | Age: 55
End: 2020-01-22
Payer: COMMERCIAL

## 2020-01-22 VITALS
HEART RATE: 103 BPM | WEIGHT: 293 LBS | BODY MASS INDEX: 66.34 KG/M2 | DIASTOLIC BLOOD PRESSURE: 84 MMHG | OXYGEN SATURATION: 98 % | SYSTOLIC BLOOD PRESSURE: 128 MMHG | TEMPERATURE: 98.6 F

## 2020-01-22 LAB
CREATININE URINE POCT: 50
HBA1C MFR BLD: 7.7 %
MICROALBUMIN/CREAT 24H UR: 10 MG/G{CREAT}
MICROALBUMIN/CREAT UR-RTO: ABNORMAL

## 2020-01-22 PROCEDURE — 82044 UR ALBUMIN SEMIQUANTITATIVE: CPT | Performed by: FAMILY MEDICINE

## 2020-01-22 PROCEDURE — 83036 HEMOGLOBIN GLYCOSYLATED A1C: CPT | Performed by: FAMILY MEDICINE

## 2020-01-22 PROCEDURE — 99214 OFFICE O/P EST MOD 30 MIN: CPT | Performed by: FAMILY MEDICINE

## 2020-01-22 RX ORDER — LISINOPRIL 2.5 MG/1
2.5 TABLET ORAL DAILY
Qty: 90 TABLET | Refills: 1 | Status: SHIPPED | OUTPATIENT
Start: 2020-01-22 | End: 2020-04-16 | Stop reason: SDUPTHER

## 2020-01-22 ASSESSMENT — ENCOUNTER SYMPTOMS
SORE THROAT: 1
COUGH: 1
BACK PAIN: 1

## 2020-01-22 ASSESSMENT — PATIENT HEALTH QUESTIONNAIRE - PHQ9
SUM OF ALL RESPONSES TO PHQ QUESTIONS 1-9: 0
SUM OF ALL RESPONSES TO PHQ QUESTIONS 1-9: 0
1. LITTLE INTEREST OR PLEASURE IN DOING THINGS: 0
SUM OF ALL RESPONSES TO PHQ9 QUESTIONS 1 & 2: 0
2. FEELING DOWN, DEPRESSED OR HOPELESS: 0

## 2020-01-22 NOTE — PROGRESS NOTES
 OTHER SURGICAL HISTORY      [id number] multiple procedures to remove cancer       Family History   Problem Relation Age of Onset    Cancer Mother         thyroid    Diabetes Mother     Anemia Other     Cancer Other     Breast Cancer Other     Diabetes Other     High Blood Pressure Other     Heart Disease Other     Hypertension Father        Social History     Tobacco Use    Smoking status: Never Smoker    Smokeless tobacco: Never Used   Substance Use Topics    Alcohol use: No      Current Outpatient Medications   Medication Sig Dispense Refill    insulin detemir (LEVEMIR FLEXTOUCH) 100 UNIT/ML injection pen Inject 50 Units into the skin daily 48 mL 1    blood glucose test strips (ASCENSIA AUTODISC VI;ONE TOUCH ULTRA TEST VI) strip Test BID On insulin, type 2  strip 11    triamterene-hydrochlorothiazide (MAXZIDE) 75-50 MG per tablet Take 1 tablet by mouth daily 90 tablet 3    metFORMIN (GLUCOPHAGE) 500 MG tablet Take 1 tablet by mouth 2 times daily (with meals) 180 tablet 3    Lancets MISC Test BID and on insulin 100 each 11    Insulin Pen Needle (B-D ULTRAFINE III SHORT PEN) 31G X 8 MM MISC Inject 1 each into the skin daily May substitute with any brand 100 each 11    Dulaglutide 1.5 MG/0.5ML SOPN Inject 1.5 mg into the skin once a week 12 pen 3    Saline (SIMPLY SALINE) 0.9 % AERS 2 sprays by Nasal route 4 times daily  0    ibuprofen (ADVIL;MOTRIN) 800 MG tablet TAKE 1 TABLET BY MOUTH THREE TIMES A DAY WITH MEALS (Patient not taking: Reported on 1/22/2020) 60 tablet 0    meloxicam (MOBIC) 15 MG tablet Take 1 tablet by mouth daily (Patient not taking: Reported on 8/26/2019) 90 tablet 3     No current facility-administered medications for this visit.       Allergies   Allergen Reactions    Latex Itching and Rash    Penicillins Anaphylaxis and Other (See Comments)     Passes out, all penicillins        Health Maintenance   Topic Date Due    Diabetic foot exam  11/04/1976    HIV screen

## 2020-01-22 NOTE — PATIENT INSTRUCTIONS
Patient Education        Back Stretches: Exercises  Introduction  Here are some examples of exercises for stretching your back. Start each exercise slowly. Ease off the exercise if you start to have pain. Your doctor or physical therapist will tell you when you can start these exercises and which ones will work best for you. How to do the exercises  Overhead stretch   1. Stand comfortably with your feet shoulder-width apart. 2. Looking straight ahead, raise both arms over your head and reach toward the ceiling. Do not allow your head to tilt back. 3. Hold for 15 to 30 seconds, then lower your arms to your sides. 4. Repeat 2 to 4 times. Side stretch   1. Stand comfortably with your feet shoulder-width apart. 2. Raise one arm over your head, and then lean to the other side. 3. Slide your hand down your leg as you let the weight of your arm gently stretch your side muscles. Hold for 15 to 30 seconds. 4. Repeat 2 to 4 times on each side. Press-up   1. Lie on your stomach, supporting your body with your forearms. 2. Press your elbows down into the floor to raise your upper back. As you do this, relax your stomach muscles and allow your back to arch without using your back muscles. As your press up, do not let your hips or pelvis come off the floor. 3. Hold for 15 to 30 seconds, then relax. 4. Repeat 2 to 4 times. Relax and rest   1. Lie on your back with a rolled towel under your neck and a pillow under your knees. Extend your arms comfortably to your sides. 2. Relax and breathe normally. 3. Remain in this position for about 10 minutes. 4. If you can, do this 2 or 3 times each day. Follow-up care is a key part of your treatment and safety. Be sure to make and go to all appointments, and call your doctor if you are having problems. It's also a good idea to know your test results and keep a list of the medicines you take. Where can you learn more? Go to https://[email or web address].[employer]. org and sign in to your [employer] account. Enter C453 in the [employer] box to learn more about \"Back Stretches: Exercises. \"     If you do not have an account, please click on the \"Sign Up Now\" link. Current as of: June 26, 2019  Content Version: 12.3  © 3882-9845 [employer]. Care instructions adapted under license by [facility] ([facility]). If you have questions about a medical condition or this instruction, always ask your healthcare professional. [place] [address] any warranty or liability for your use of this information. Patient Education        Low Back Pain: Exercises  Introduction  Here are some examples of exercises for you to try. The exercises may be suggested for a condition or for rehabilitation. Start each exercise slowly. Ease off the exercises if you start to have pain. You will be told when to start these exercises and which ones will work best for you. How to do the exercises  Press-up   1. Lie on your stomach, supporting your body with your forearms. 2. Press your elbows down into the floor to raise your upper back. As you do this, relax your stomach muscles and allow your back to arch without using your back muscles. As your press up, do not let your hips or pelvis come off the floor. 3. Hold for 15 to 30 seconds, then relax. 4. Repeat 2 to 4 times. Alternate arm and leg (bird dog) exercise   1. Start on the floor, on your hands and knees. 2. Tighten your belly muscles. 3. Raise one leg off the floor, and hold it straight out behind you. Be careful not to let your hip drop down, because that will twist your trunk. 4. Hold for about 6 seconds, then lower your leg and switch to the other leg. 5. Repeat 8 to 12 times on each leg. 6. Over time, work up to holding for 10 to 30 seconds each time. 7. If you feel stable and secure with your leg raised, try raising the opposite arm straight out in front of you at the same time.     Knee-to-chest exercise 1. Lie on your back with your knees bent and your feet flat on the floor. 2. Bring one knee to your chest, keeping the other foot flat on the floor (or keeping the other leg straight, whichever feels better on your lower back). 3. Keep your lower back pressed to the floor. Hold for at least 15 to 30 seconds. 4. Relax, and lower the knee to the starting position. 5. Repeat with the other leg. Repeat 2 to 4 times with each leg. 6. To get more stretch, put your other leg flat on the floor while pulling your knee to your chest.    Curl-ups   1. Lie on the floor on your back with your knees bent at a 90-degree angle. Your feet should be flat on the floor, about 12 inches from your buttocks. 2. Cross your arms over your chest. If this bothers your neck, try putting your hands behind your neck (not your head), with your elbows spread apart. 3. Slowly tighten your belly muscles and raise your shoulder blades off the floor. 4. Keep your head in line with your body, and do not press your chin to your chest.  5. Hold this position for 1 or 2 seconds, then slowly lower yourself back down to the floor. 6. Repeat 8 to 12 times. Pelvic tilt exercise   1. Lie on your back with your knees bent. 2. \"Brace\" your stomach. This means to tighten your muscles by pulling in and imagining your belly button moving toward your spine. You should feel like your back is pressing to the floor and your hips and pelvis are rocking back. 3. Hold for about 6 seconds while you breathe smoothly. 4. Repeat 8 to 12 times. Heel dig bridging   1. Lie on your back with both knees bent and your ankles bent so that only your heels are digging into the floor. Your knees should be bent about 90 degrees. 2. Then push your heels into the floor, squeeze your buttocks, and lift your hips off the floor until your shoulders, hips, and knees are all in a straight line.   3. Hold for about 6 seconds as you continue to breathe normally, and then you have questions about a medical condition or this instruction, always ask your healthcare professional. [place] [address] any warranty or liability for your use of this information.

## 2020-01-24 ENCOUNTER — OFFICE VISIT (OUTPATIENT)
Dept: PRIMARY CARE CLINIC | Age: 55
End: 2020-01-24
Payer: COMMERCIAL

## 2020-01-24 VITALS
SYSTOLIC BLOOD PRESSURE: 140 MMHG | DIASTOLIC BLOOD PRESSURE: 82 MMHG | WEIGHT: 293 LBS | OXYGEN SATURATION: 96 % | BODY MASS INDEX: 65.52 KG/M2 | HEART RATE: 105 BPM

## 2020-01-24 PROCEDURE — 99212 OFFICE O/P EST SF 10 MIN: CPT | Performed by: FAMILY MEDICINE

## 2020-01-24 NOTE — PROGRESS NOTES
mouth daily 90 tablet 1    blood glucose test strips (ASCENSIA AUTODISC VI;ONE TOUCH ULTRA TEST VI) strip Test BID On insulin, type 2  strip 11    triamterene-hydrochlorothiazide (MAXZIDE) 75-50 MG per tablet Take 1 tablet by mouth daily 90 tablet 3    metFORMIN (GLUCOPHAGE) 500 MG tablet Take 1 tablet by mouth 2 times daily (with meals) 180 tablet 3    Lancets MISC Test BID and on insulin 100 each 11    Insulin Pen Needle (B-D ULTRAFINE III SHORT PEN) 31G X 8 MM MISC Inject 1 each into the skin daily May substitute with any brand 100 each 11    Dulaglutide 1.5 MG/0.5ML SOPN Inject 1.5 mg into the skin once a week 12 pen 3    Saline (SIMPLY SALINE) 0.9 % AERS 2 sprays by Nasal route 4 times daily  0    meloxicam (MOBIC) 15 MG tablet Take 1 tablet by mouth daily 90 tablet 3     No current facility-administered medications for this visit. Allergies   Allergen Reactions    Latex Itching and Rash    Penicillins Anaphylaxis and Other (See Comments)     Passes out, all penicillins        Health Maintenance   Topic Date Due    HIV screen  11/04/1981    Hepatitis B vaccine (1 of 3 - Risk 3-dose series) 11/04/1985    Breast cancer screen  11/04/2016    Shingles Vaccine (1 of 2) 11/04/2016    Colon cancer screen colonoscopy  11/04/2016    Lipid screen  07/09/2020    Potassium monitoring  07/21/2020    Creatinine monitoring  07/21/2020    Diabetic retinal exam  10/24/2020    Diabetic foot exam  01/22/2021    A1C test (Diabetic or Prediabetic)  01/22/2021    Diabetic microalbuminuria test  01/22/2021    DTaP/Tdap/Td vaccine (2 - Td) 10/27/2027    Flu vaccine  Completed    Pneumococcal 0-64 years Vaccine  Completed       Subjective:      Review of Systems   Constitutional: Negative. Objective:     BP (!) 140/82   Pulse 105   Wt (!) 369 lb 12.8 oz (167.7 kg)   SpO2 96%   BMI 65.52 kg/m²   Physical Exam  Constitutional:       Appearance: Normal appearance. She is obese. Neurological:      Mental Status: She is alert. Psychiatric:         Mood and Affect: Mood normal.         Behavior: Behavior normal.         Thought Content: Thought content normal.      Comments: anxious         Assessment:       Diagnosis Orders   1. Type 2 diabetes mellitus without complication, with long-term current use of insulin ([facility]          Plan:      Return for diabetes mellitus. Avoid NSAIDs, make sure to drink water. Important to get DM under control. Microalbuminuria can improve. No orders of the defined types were placed in this encounter. No orders of the defined types were placed in this encounter. Patient given educational materials - see patient instructions. Discussed use, benefit, and side effects of prescribed medications. All patient questions answered. Pt voiced understanding. Reviewed health maintenance. Instructed to continue current medications, diet and exercise. Patient agreed with treatment plan. Follow up as directed.      Electronicallysigned by Deni Nascimento MD on 1/24/2020 at 12:01 PM

## 2020-04-16 RX ORDER — INSULIN DETEMIR 100 [IU]/ML
50 INJECTION, SOLUTION SUBCUTANEOUS DAILY
Qty: 48 ML | Refills: 1 | Status: SHIPPED | OUTPATIENT
Start: 2020-04-16 | End: 2020-12-01 | Stop reason: SDUPTHER

## 2020-04-16 RX ORDER — LISINOPRIL 2.5 MG/1
2.5 TABLET ORAL DAILY
Qty: 90 TABLET | Refills: 1 | Status: SHIPPED | OUTPATIENT
Start: 2020-04-16 | End: 2020-10-21

## 2020-04-16 RX ORDER — TRIAMTERENE AND HYDROCHLOROTHIAZIDE 75; 50 MG/1; MG/1
1 TABLET ORAL DAILY
Qty: 90 TABLET | Refills: 3 | Status: SHIPPED | OUTPATIENT
Start: 2020-04-16 | End: 2020-12-01 | Stop reason: SDUPTHER

## 2020-05-26 ENCOUNTER — TELEMEDICINE (OUTPATIENT)
Dept: PRIMARY CARE CLINIC | Age: 55
End: 2020-05-26
Payer: COMMERCIAL

## 2020-05-26 ENCOUNTER — TELEPHONE (OUTPATIENT)
Dept: PRIMARY CARE CLINIC | Age: 55
End: 2020-05-26

## 2020-05-26 VITALS — BODY MASS INDEX: 63.61 KG/M2 | WEIGHT: 293 LBS

## 2020-05-26 PROCEDURE — 99213 OFFICE O/P EST LOW 20 MIN: CPT | Performed by: FAMILY MEDICINE

## 2020-05-26 PROCEDURE — 3051F HG A1C>EQUAL 7.0%<8.0%: CPT | Performed by: FAMILY MEDICINE

## 2020-05-26 ASSESSMENT — ENCOUNTER SYMPTOMS
BACK PAIN: 1
RESPIRATORY NEGATIVE: 1

## 2020-05-26 NOTE — PROGRESS NOTES
Elizabeth Barnhartis a 49 y.o. female who presents today for her medical conditions/complaints as notedbelow. Chief Complaint   Patient presents with    Diabetes         HPI:   Diabetes   She presents for her follow-up diabetic visit. She has type 2 diabetes mellitus. Her disease course has been stable. There are no hypoglycemic associated symptoms. There are no hypoglycemic complications. There are no diabetic complications. Current diabetic treatment includes insulin injections and oral agent (monotherapy). Her weight is decreasing steadily. She is following a generally healthy diet. An ACE inhibitor/angiotensin II receptor blocker is being taken. sugars: am:   HS: 150-185  Has been watching her diet, hasn't been to the gym since Covid. Just moved to a new house. She would like to restart PT. With the rainy weather: had a lot of back pain.    LDL Cholesterol (mg/dL)   Date Value   2019 118     LDL Calculated (mg/dL)   Date Value   2011 89       (goal LDL is <100)   AST (U/L)   Date Value   2019 36 (H)     ALT (U/L)   Date Value   2019 55 (H)     BUN (mg/dL)   Date Value   2019 12     BP Readings from Last 3 Encounters:   20 (!) 140/82   20 128/84   19 128/74          (goal 120/80)    Past Medical History:   Diagnosis Date    Acute left-sided low back pain with left-sided sciatica     Arthritis     BMI 70 and over, adult ([address]     Cancer ([address]     Diabetes mellitus ([address]     High blood pressure     Hypertension     Sleep apnea       Past Surgical History:   Procedure Laterality Date     SECTION      HYSTERECTOMY, TOTAL ABDOMINAL      OTHER SURGICAL HISTORY      [id number] multiple procedures to remove cancer       Family History   Problem Relation Age of Onset    Cancer Mother         thyroid    Diabetes Mother     Anemia Other     Cancer Other     Breast Cancer Other     Diabetes Other     High Blood Pressure Other patient's risk of exposure to COVID-19 and provide necessary medical care. The patient (and/or legal guardian) has also been advised to contact this office for worsening conditions or problems, and seek emergency medical treatment and/or call 911 if deemed necessary. Patient identification was verified at the start of the visit: Yes    Total time spent for this encounter: Not billed by time    Services were provided through a video synchronous discussion virtually to substitute for in-person clinic visit. Patient  located at her home. Provider in the office. --Ruth Mello MD on 5/26/2020 at 1:26 PM    An electronic signature was used to authenticate this note. Orders Placed This Encounter   Procedures    External Referral To Physical Therapy     Referral Priority:   Routine     Referral Type:   Eval and Treat     Referral Reason:   Specialty Services Required     Requested Specialty:   Physical Therapy     Number of Visits Requested:   1     No orders of the defined types were placed in this encounter. Patient given educational materials - see patient instructions. Discussed use, benefit, and side effects of prescribed medications. All patient questions answered. Pt voiced understanding. Reviewed health maintenance. Instructed to continue current medications, diet and try to  exercise. Patient agreed with treatment plan. Follow up as directed.      Electronicallysigned by Ruth Mello MD on 5/26/2020 at 1:26 PM

## 2020-06-12 ENCOUNTER — TELEPHONE (OUTPATIENT)
Dept: PRIMARY CARE CLINIC | Age: 55
End: 2020-06-12

## 2020-06-12 RX ORDER — DULAGLUTIDE 1.5 MG/.5ML
INJECTION, SOLUTION SUBCUTANEOUS
Qty: 4 PEN | Refills: 5 | Status: SHIPPED | OUTPATIENT
Start: 2020-06-12 | End: 2020-12-01

## 2020-07-09 ENCOUNTER — PATIENT MESSAGE (OUTPATIENT)
Dept: PRIMARY CARE CLINIC | Age: 55
End: 2020-07-09

## 2020-07-09 NOTE — TELEPHONE ENCOUNTER
The recall was for extended release metformin made by [employer] and [employer]   Once again it is a drug recall due to contamination occurring during the manufacturing process. The drug itself is safe    Your metformin doesn't appear to be the extended release form. So I dont' think you have to worry about this.  Check with your pharmacy to make sure

## 2020-07-09 NOTE — TELEPHONE ENCOUNTER
From: Jaiden Moreland  To: Uriel Kate MD  Sent: 7/9/2020 9:01 AM EDT  Subject: Prescription Question    Good morning,    I've seen press regarding the recent recalls of Metformin. Should I be concerned? How do I know if my prescription is affected by this?     Thank you,  Genia Bella

## 2020-07-27 ENCOUNTER — TELEPHONE (OUTPATIENT)
Dept: PRIMARY CARE CLINIC | Age: 55
End: 2020-07-27

## 2020-08-14 ENCOUNTER — OFFICE VISIT (OUTPATIENT)
Dept: PRIMARY CARE CLINIC | Age: 55
End: 2020-08-14
Payer: COMMERCIAL

## 2020-08-14 VITALS
TEMPERATURE: 98.6 F | OXYGEN SATURATION: 97 % | SYSTOLIC BLOOD PRESSURE: 160 MMHG | HEART RATE: 110 BPM | BODY MASS INDEX: 64.04 KG/M2 | DIASTOLIC BLOOD PRESSURE: 80 MMHG | WEIGHT: 293 LBS

## 2020-08-14 LAB — HBA1C MFR BLD: 7.3 %

## 2020-08-14 PROCEDURE — 83036 HEMOGLOBIN GLYCOSYLATED A1C: CPT | Performed by: FAMILY MEDICINE

## 2020-08-14 PROCEDURE — 3051F HG A1C>EQUAL 7.0%<8.0%: CPT | Performed by: FAMILY MEDICINE

## 2020-08-14 PROCEDURE — 99214 OFFICE O/P EST MOD 30 MIN: CPT | Performed by: FAMILY MEDICINE

## 2020-08-14 ASSESSMENT — ENCOUNTER SYMPTOMS: NAUSEA: 1

## 2020-08-14 NOTE — PROGRESS NOTES
SURGICAL HISTORY      [id number] multiple procedures to remove cancer       Family History   Problem Relation Age of Onset    Cancer Mother         thyroid    Diabetes Mother     Anemia Other     Cancer Other     Breast Cancer Other     Diabetes Other     High Blood Pressure Other     Heart Disease Other     Hypertension Father        Social History     Tobacco Use    Smoking status: Never Smoker    Smokeless tobacco: Never Used   Substance Use Topics    Alcohol use: No      Current Outpatient Medications   Medication Sig Dispense Refill    TRULICITY 1.5 YW/6.9QY SOPN INJECT 1.5 MG INTO THE SKIN ONCE A WEEK 4 pen 5    triamterene-hydrochlorothiazide (MAXZIDE) 75-50 MG per tablet Take 1 tablet by mouth daily 90 tablet 3    insulin detemir (LEVEMIR FLEXTOUCH) 100 UNIT/ML injection pen Inject 50 Units into the skin daily (Patient taking differently: Inject 55 Units into the skin daily ) 48 mL 1    lisinopril (PRINIVIL;ZESTRIL) 2.5 MG tablet Take 1 tablet by mouth daily 90 tablet 1    metFORMIN (GLUCOPHAGE) 500 MG tablet Take 1 tablet by mouth 2 times daily (with meals) 180 tablet 3    blood glucose test strips (ASCENSIA AUTODISC VI;ONE TOUCH ULTRA TEST VI) strip Test BID On insulin, type 2  strip 11    Lancets MISC Test BID and on insulin 100 each 11    Insulin Pen Needle (B-D ULTRAFINE III SHORT PEN) 31G X 8 MM MISC Inject 1 each into the skin daily May substitute with any brand 100 each 11    Saline (SIMPLY SALINE) 0.9 % AERS 2 sprays by Nasal route 4 times daily  0     No current facility-administered medications for this visit.       Allergies   Allergen Reactions    Latex Itching and Rash    Penicillins Anaphylaxis and Other (See Comments)     Passes out, all penicillins        Health Maintenance   Topic Date Due    HIV screen  11/04/1981    Hepatitis B vaccine (1 of 3 - Risk 3-dose series) 11/04/1985    Breast cancer screen  11/04/2016    Shingles Vaccine (1 of 2) 11/04/2016    Colon cancer screen colonoscopy  11/04/2016    Lipid screen  07/09/2020    Potassium monitoring  07/21/2020    Creatinine monitoring  07/21/2020    Flu vaccine (1) 09/01/2020    Diabetic retinal exam  10/24/2020    Diabetic foot exam  01/22/2021    A1C test (Diabetic or Prediabetic)  01/22/2021    Diabetic microalbuminuria test  01/22/2021    DTaP/Tdap/Td vaccine (2 - Td) 10/27/2027    Pneumococcal 0-64 years Vaccine  Completed    Hepatitis A vaccine  Aged Out    Hib vaccine  Aged Out    Meningococcal (ACWY) vaccine  Aged Out       Subjective:      Review of Systems   Gastrointestinal: Positive for nausea (in am off and on.). drinking broth helps. Objective:     BP (!) 160/80   Pulse 110   Temp 98.6 °F (37 °C)   Wt (!) 361 lb 6.4 oz (163.9 kg)   SpO2 97%   BMI 64.04 kg/m²   Physical Exam  Vitals signs and nursing note reviewed. Constitutional:       General: She is not in acute distress. Appearance: She is well-developed. HENT:      Head: Normocephalic and atraumatic. Right Ear: Tympanic membrane and ear canal normal.      Left Ear: Tympanic membrane and ear canal normal.   Eyes:      General: No scleral icterus. Right eye: No discharge. Left eye: No discharge. Conjunctiva/sclera: Conjunctivae normal.      Pupils: Pupils are equal, round, and reactive to light. Neck:      Thyroid: No thyromegaly. Trachea: No tracheal deviation. Cardiovascular:      Rate and Rhythm: Normal rate and regular rhythm. Heart sounds: Normal heart sounds. Comments: No carotid bruits  Pulmonary:      Effort: Pulmonary effort is normal. No respiratory distress. Breath sounds: Normal breath sounds. No wheezing. Musculoskeletal:      Right lower leg: No edema. Left lower leg: No edema. Comments: Lymphedema: lower legs     Lymphadenopathy:      Cervical: No cervical adenopathy. Skin:     General: Skin is warm. Findings: No rash.    Neurological: Mental Status: She is alert and oriented to person, place, and time. Psychiatric:         Mood and Affect: Mood normal.         Behavior: Behavior normal.         Thought Content: Thought content normal.         Assessment:       Diagnosis Orders   1. Type 2 diabetes mellitus without complication, with long-term current use of insulin (HCC)  NC COLLECTION CAPILLARY BLOOD SPECIMEN    POCT glycosylated hemoglobin (Hb A1C)   2. Essential hypertension          Plan:      Return in about 3 months (around 11/14/2020) for diabetes mellitus, hypertension. May have to consider cutting trulicity down if nausea continue  Ok to try cr for 3-4 months. Orders Placed This Encounter   Procedures    POCT glycosylated hemoglobin (Hb A1C)    NC COLLECTION CAPILLARY BLOOD SPECIMEN     No orders of the defined types were placed in this encounter. Patient given educational materials - see patient instructions. Discussed use, benefit, and side effects of prescribed medications. All patient questions answered. Pt voiced understanding. Reviewed health maintenance. Instructed to continue current medications, diet and exercise. Patient agreed with treatment plan. Follow up as directed.      Electronicallysigned by Ana Salcedo MD on 8/14/2020 at 11:31 AM

## 2020-08-14 NOTE — PATIENT INSTRUCTIONS
usual.  · You have new or worsening belly pain. · You have a new or higher fever. · You vomit blood or what looks like coffee grounds. Watch closely for changes in your health, and be sure to contact your doctor if:  · You have ongoing nausea and vomiting. · Your vomiting is getting worse. · Your vomiting lasts longer than 2 days. · You are not getting better as expected. Where can you learn more? Go to https://Verold.Fleksy. org and sign in to your adicate timeads account. Enter 00 [id number] in the [employer] box to learn more about \"Nausea and Vomiting: Care Instructions. \"     If you do not have an account, please click on the \"Sign Up Now\" link. Current as of: June 26, 2019               Content Version: 12.5  © 9989-8872 Healthwise, Incorporated. Care instructions adapted under license by [facility] ([facility]). If you have questions about a medical condition or this instruction, always ask your healthcare professional. [place] [address] any warranty or liability for your use of this information. Patient Education        dulaglutide  Pronunciation:  DOO la GLOO tide  Brand:  Trulicity Pen  What is the most important information I should know about dulaglutide? You should not use dulaglutide if you have Multiple Endocrine Neoplasia syndrome type 2 (MEN 2), or a personal or family history of medullary thyroid carcinoma (a type of thyroid cancer). Do not use dulaglutide if you are in a state of diabetic ketoacidosis (call your doctor for treatment). In animal studies, dulaglutide caused thyroid tumors or thyroid cancer. It is not known whether these effects would occur in people using regular doses. Ask your doctor about your risk. Call your doctor at once if you have signs of a thyroid tumor, such as swelling or a lump in your neck, trouble swallowing, a hoarse voice, or shortness of breath. What is dulaglutide?   Dulaglutide is an injectable diabetes medicine that helps control blood sugar levels. Dulaglutide is used together with diet and exercise to improve blood sugar control in adults with type 2 diabetes mellitus. Dulaglutide is usually given after other diabetes medicines have been tried without success. This medicine is not for treating type 1 diabetes. Dulaglutide may also be used for purposes not listed in this medication guide. What should I discuss with my healthcare provider before using dulaglutide? You should not use dulaglutide if you are allergic to it, or if you have:  · multiple endocrine neoplasia type 2 (tumors in your glands);  · a personal or family history of medullary thyroid carcinoma (a type of thyroid cancer); or  · diabetic ketoacidosis (call your doctor for treatment). Tell your doctor if you have ever had:  · pancreatitis;  · a stomach or intestinal disorder;  · gastroesophageal reflux disease (GERD) or slow digestion;  · liver or kidney disease;  · if you also use insulin or oral diabetes medicine; or  · if you have been sick with vomiting or diarrhea. In animal studies, dulaglutide caused thyroid tumors or thyroid cancer. It is not known whether these effects would occur in people using regular doses. Ask your doctor about your risk. It is not known whether this medicine will harm an unborn baby. Tell your doctor if you are pregnant or plan to become pregnant. It may not be safe to breast-feed while using this medicine. Ask your doctor about any risk. Dulaglutide is not approved for use by anyone younger than 25years old. How should I use dulaglutide? Follow all directions on your prescription label and read all medication guides or instruction sheets. Use the medicine exactly as directed. Dulaglutide is injected under the skin once per week. Use dulaglutide on the same day each week at the same time of day. If you change your dosing day, allow at least 3 days to pass between doses. You may use dulaglutide with or without food.   Read and carefully follow any Instructions for Use provided with your medicine. Do not use dulaglutide if you don't understand all instructions for proper use. Ask your doctor or pharmacist if you have questions. Your care provider will show you where on your body to inject dulaglutide. Use a different place each time you give an injection. Do not inject into the same place two times in a row. Low blood sugar (hypoglycemia) can happen to everyone who has diabetes. Symptoms include headache, hunger, sweating, irritability, dizziness, nausea, fast heart rate, and feeling anxious or shaky. To quickly treat low blood sugar, always keep a fast-acting source of sugar with you such as fruit juice, hard candy, crackers, raisins, or non-diet soda. Your doctor can prescribe a glucagon emergency injection kit to use in case you have severe hypoglycemia and cannot eat or drink. Be sure your family and close friends know how to give you this injection in an emergency. Also watch for signs of high blood sugar (hyperglycemia) such as increased thirst or urination, blurred vision, headache, and tiredness. Blood sugar levels can be affected by stress, illness, surgery, exercise, alcohol use, or skipping meals. Ask your doctor before changing your dose or medication schedule. Each injection pen or prefilled syringe is for one use only. Throw away after one use, even if there is still medicine left inside. Use a puncture-proof \"sharps\" container. Follow state or local laws about how to dispose of this container. Keep it out of the reach of children and pets. Store dulaglutide in the refrigerator, protected from light. Do not use past the expiration date on the medicine label. Do not freeze dulaglutide, and throw away the medicine if it has become frozen. You may also store dulaglutide at room temperature for up to 14 days before use. What happens if I miss a dose?   Use the medicine as soon as you can, but skip the missed dose if your next dose is due in less than 3 days. Do not use two doses at one time. Do not use this medicine twice within a 72-hour period. What happens if I overdose? Seek emergency medical attention or call the Poison Help line at 1-823.763.1631. What should I avoid while using dulaglutide? Never share an injection pen or prefilled syringe with another person, even if the needle has been changed. Sharing these devices can allow infections or disease to pass from one person to another. What are the possible side effects of dulaglutide? Stop using dulaglutide and get emergency medical help if you have signs of an allergic reaction: hives; difficult breathing; feeling light-headed; swelling of your face, lips, tongue, or throat. Call your doctor at once if you have:  · pancreatitis --severe pain in your upper stomach spreading to your back, nausea and vomiting;  · signs of a thyroid tumor --swelling or a lump in your neck, trouble swallowing, a hoarse voice, or if you feel short of breath;  · low blood sugar --headache, hunger, weakness, sweating, confusion, irritability, dizziness, fast heart rate, or feeling jittery; or  · kidney problems --little or no urination, swelling in your feet or ankles, feeling tired or short of breath. Tell your doctor if you are sick with vomiting or diarrhea, or if you are sweating more than usual. You can easily become dehydrated while using dulaglutide. Common side effects may include:  · nausea, vomiting, stomach pain;  · diarrhea; or  · loss of appetite. This is not a complete list of side effects and others may occur. Call your doctor for medical advice about side effects. You may report side effects to FDA at 8-850-FDA-7830. What other drugs will affect dulaglutide? Dulaglutide can slow your digestion, and it may take longer for your body to absorb any medicines you take by mouth.   Other drugs may affect dulaglutide, including prescription and over-the-counter medicines, vitamins, and herbal products. Tell your doctor about all your current medicines and any medicine you start or stop using. Where can I get more information? Your pharmacist can provide more information about dulaglutide. Remember, keep this and all other medicines out of the reach of children, never share your medicines with others, and use this medication only for the indication prescribed. Every effort has been made to ensure that the information provided by [address] [place] [address]  is accurate, up-to-date, and complete, but no guarantee is made to that effect. Drug information contained herein may be time sensitive. [facility] information has been compiled for use by healthcare practitioners and consumers in the [place] and therefore [facility] does not warrant that uses outside of the [place] are appropriate, unless specifically indicated otherwise. [facility]'s drug information does not endorse drugs, diagnose patients or recommend therapy. [facility]'s drug information is an informational resource designed to assist licensed healthcare practitioners in caring for their patients and/or to serve consumers viewing this service as a supplement to, and not a substitute for, the expertise, skill, knowledge and judgment of healthcare practitioners. The absence of a warning for a given drug or drug combination in no way should be construed to indicate that the drug or drug combination is safe, effective or appropriate for any given patient. [facility] does not assume any responsibility for any aspect of healthcare administered with the aid of information [facility] provides. The information contained herein is not intended to cover all possible uses, directions, precautions, warnings, drug interactions, allergic reactions, or adverse effects. If you have questions about the drugs you are taking, check with your doctor, nurse or pharmacist.  Copyright 3500-4560 [employer] [address] [place] [address] Avenue: 2.02.  Revision date: 7/15/2019. Care instructions adapted under license by [facility] ([facility]). If you have questions about a medical condition or this instruction, always ask your healthcare professional. [address] any warranty or liability for your use of this information.

## 2020-09-08 ENCOUNTER — TELEMEDICINE (OUTPATIENT)
Dept: PULMONOLOGY | Age: 55
End: 2020-09-08
Payer: COMMERCIAL

## 2020-09-08 PROCEDURE — 99213 OFFICE O/P EST LOW 20 MIN: CPT | Performed by: INTERNAL MEDICINE

## 2020-09-08 ASSESSMENT — ENCOUNTER SYMPTOMS
EYES NEGATIVE: 1
RESPIRATORY NEGATIVE: 1
GASTROINTESTINAL NEGATIVE: 1

## 2020-09-08 NOTE — PROGRESS NOTES
2020    TELEHEALTH EVALUATION -- Audio/Visual (During [id number] public health emergency)    Patient and physician are located in their individual locations. This is visit is completed via Fieldwire application []/ Doxy. me[x] / Telephone []     HPI:    Albert Heath (:  1965) has requested an audio/video evaluation for the following concern(s):    Patient returns for follow up of sleep apnea. Since last visit 14 months ago, patient has been very compliant with BiPAP. Uses every night, for the entire night. Reports refreshing and restorative sleep. Denies snoring. Reports normal daytime alertness. Believes benefiting greatly. Compliance download from 20 to 20 shows 98% compliance for average of 7.5hrs per night. Residual AHI 0.5. BiPAP pressure 19/15 cm H2O. Needs new mask tubing and supplies. Review of Systems   Constitutional: Negative. HENT: Negative. Eyes: Negative. Respiratory: Negative. Cardiovascular: Negative. Gastrointestinal: Negative. All other systems reviewed and are negative. Prior to Visit Medications    Medication Sig Taking?  Authorizing Provider   TRULICITY 1.5 BF/9.7UM SOPN INJECT 1.5 MG INTO THE SKIN ONCE A WEEK Yes Guy Bumpers, MD   insulin detemir (LEVEMIR FLEXTOUCH) 100 UNIT/ML injection pen Inject 50 Units into the skin daily  Patient taking differently: Inject 55 Units into the skin daily  Yes Guy Bumpers, MD   metFORMIN (GLUCOPHAGE) 500 MG tablet Take 1 tablet by mouth 2 times daily (with meals) Yes Guy Bumpers, MD   blood glucose test strips (ASCENSIA AUTODISC VI;ONE TOUCH ULTRA TEST VI) strip Test BID On insulin, type 2 DM Yes Cayla Garcia MD   Lancets MISC Test BID and on insulin Yes Guy Bumpers, MD   Insulin Pen Needle (B-D ULTRAFINE III SHORT PEN) 31G X 8 MM MISC Inject 1 each into the skin daily May substitute with any brand Yes Guy Bumpers, MD   Saline (SIMPLY SALINE) 0.9 % AERS 2 sprays by Nasal route 4 times cranial nerve (limited exam to video visit) [x] No focal weakness observed       [] Abnormal          Speech       [x] Normal   [] Abnormal     Skin:        [x] No rash on visible skin  [x] Normal  [] Abnormal     Psychiatric:       [x] Normal  [] Abnormal        [x] Normal Mood  [] Anxious appearing        Other Pertinent Exam Findings:       ASSESSMENT:  1. KIANA treated with BiPAP    2. Class 3 severe obesity due to excess calories with serious comorbidity and body mass index (BMI) of 60.0 to 69.9 in [facility])          Plan:   1. New BiPAP mask tubing and supplies. 2. Weight loss. 3. Avoid sedative hypnotics and alcohol at bedtime. 4. Encouraged flu shot. 5. Discussed strategies to mitigate risk of COVID-19 infection. 6. Return in 1 year. An  electronic signature was used to authenticate this note. --Tabby Angel DO on 9/8/2020 at 9:27 AM    9}    Pursuant to the emergency declaration under the [facility], [facility] waiver authority and the [employer] Act, this Virtual  Visit was conducted, with patient's consent, to reduce the patient's risk of exposure to COVID-19 and provide continuity of care for an established patient. Services were provided through a video synchronous discussion virtually to substitute for in-person clinic visit.

## 2020-09-08 NOTE — PATIENT INSTRUCTIONS
Order and dictation faxed to [id number] at [phone]  [facility] 9/8/2020

## 2020-10-20 ENCOUNTER — PATIENT MESSAGE (OUTPATIENT)
Dept: PRIMARY CARE CLINIC | Age: 55
End: 2020-10-20

## 2020-10-21 RX ORDER — LISINOPRIL 2.5 MG/1
TABLET ORAL
Qty: 90 TABLET | Refills: 1 | Status: SHIPPED | OUTPATIENT
Start: 2020-10-21 | End: 2020-12-01 | Stop reason: SDUPTHER

## 2020-10-27 RX ORDER — LANCETS 30 GAUGE
EACH MISCELLANEOUS
Qty: 100 EACH | Refills: 11 | Status: SHIPPED | OUTPATIENT
Start: 2020-10-27 | End: 2021-04-05 | Stop reason: SDUPTHER

## 2020-10-27 NOTE — TELEPHONE ENCOUNTER
From: Brett Wang  To: Dayan Connolly MD  Sent: 10/20/2020 3:16 PM EDT  Subject: Prescription Question    Hi,     Can you please renew my test strips, lancets and glucometer prescription. I use [facility] in [place].      Thank you,  January Blanton

## 2020-12-01 RX ORDER — INSULIN DETEMIR 100 [IU]/ML
50 INJECTION, SOLUTION SUBCUTANEOUS DAILY
Qty: 48 ML | Refills: 1 | Status: SHIPPED | OUTPATIENT
Start: 2020-12-01 | End: 2021-04-05 | Stop reason: SDUPTHER

## 2020-12-01 RX ORDER — TRIAMTERENE AND HYDROCHLOROTHIAZIDE 75; 50 MG/1; MG/1
1 TABLET ORAL DAILY
Qty: 90 TABLET | Refills: 3 | Status: SHIPPED | OUTPATIENT
Start: 2020-12-01 | End: 2021-04-05

## 2020-12-01 RX ORDER — LISINOPRIL 2.5 MG/1
TABLET ORAL
Qty: 90 TABLET | Refills: 1 | Status: SHIPPED | OUTPATIENT
Start: 2020-12-01 | End: 2021-04-05 | Stop reason: SDUPTHER

## 2020-12-01 RX ORDER — DULAGLUTIDE 1.5 MG/.5ML
INJECTION, SOLUTION SUBCUTANEOUS
Qty: 12 PEN | Refills: 3 | Status: SHIPPED | OUTPATIENT
Start: 2020-12-01 | End: 2021-04-05 | Stop reason: SDUPTHER

## 2020-12-01 NOTE — TELEPHONE ENCOUNTER
Lisinopril last filled 10/21/2020. Metformin last filled 04/16/2020  Levemir last filled 04/16/2020  Triameterene last filled 04/16/2020.

## 2020-12-08 RX ORDER — PEN NEEDLE, DIABETIC 31 GX5/16"
1 NEEDLE, DISPOSABLE MISCELLANEOUS DAILY
Qty: 100 EACH | Refills: 11 | Status: SHIPPED | OUTPATIENT
Start: 2020-12-08 | End: 2021-04-05 | Stop reason: SDUPTHER

## 2020-12-08 NOTE — TELEPHONE ENCOUNTER
OV 8/14/20    Insulin Pen Needle (B-D ULTRAFINE III SHORT PEN) 31G X 8 MM [facility]/pharmacy #[id number] [place], [place] - [employer]

## 2020-12-21 ENCOUNTER — OFFICE VISIT (OUTPATIENT)
Dept: PRIMARY CARE CLINIC | Age: 55
End: 2020-12-21
Payer: COMMERCIAL

## 2020-12-21 VITALS
HEIGHT: 63 IN | SYSTOLIC BLOOD PRESSURE: 132 MMHG | OXYGEN SATURATION: 98 % | WEIGHT: 293 LBS | DIASTOLIC BLOOD PRESSURE: 80 MMHG | TEMPERATURE: 96.9 F | BODY MASS INDEX: 51.91 KG/M2 | HEART RATE: 116 BPM

## 2020-12-21 LAB — HBA1C MFR BLD: 7.7 %

## 2020-12-21 PROCEDURE — 3051F HG A1C>EQUAL 7.0%<8.0%: CPT | Performed by: FAMILY MEDICINE

## 2020-12-21 PROCEDURE — 99214 OFFICE O/P EST MOD 30 MIN: CPT | Performed by: FAMILY MEDICINE

## 2020-12-21 PROCEDURE — 83036 HEMOGLOBIN GLYCOSYLATED A1C: CPT | Performed by: FAMILY MEDICINE

## 2020-12-21 SDOH — ECONOMIC STABILITY: FOOD INSECURITY: WITHIN THE PAST 12 MONTHS, THE FOOD YOU BOUGHT JUST DIDN'T LAST AND YOU DIDN'T HAVE MONEY TO GET MORE.: NEVER TRUE

## 2020-12-21 SDOH — ECONOMIC STABILITY: TRANSPORTATION INSECURITY
IN THE PAST 12 MONTHS, HAS LACK OF TRANSPORTATION KEPT YOU FROM MEETINGS, WORK, OR FROM GETTING THINGS NEEDED FOR DAILY LIVING?: NO

## 2020-12-21 SDOH — ECONOMIC STABILITY: INCOME INSECURITY: HOW HARD IS IT FOR YOU TO PAY FOR THE VERY BASICS LIKE FOOD, HOUSING, MEDICAL CARE, AND HEATING?: NOT HARD AT ALL

## 2020-12-21 SDOH — ECONOMIC STABILITY: FOOD INSECURITY: WITHIN THE PAST 12 MONTHS, YOU WORRIED THAT YOUR FOOD WOULD RUN OUT BEFORE YOU GOT MONEY TO BUY MORE.: NEVER TRUE

## 2020-12-21 SDOH — ECONOMIC STABILITY: TRANSPORTATION INSECURITY
IN THE PAST 12 MONTHS, HAS THE LACK OF TRANSPORTATION KEPT YOU FROM MEDICAL APPOINTMENTS OR FROM GETTING MEDICATIONS?: NO

## 2020-12-21 ASSESSMENT — ENCOUNTER SYMPTOMS: BACK PAIN: 1

## 2020-12-21 NOTE — PROGRESS NOTES
Brayan Jackson a 48 y.o. female who presents today for her medical conditions/complaints as notedbelow. Chief Complaint   Patient presents with    Diabetes     Patient checks bs at home    Medication Check    Hypertension     Patient doesn't check bp at home         HPI:   Diabetes  She presents for her follow-up diabetic visit. She has type 2 diabetes mellitus. Her disease course has been stable. There are no hypoglycemic associated symptoms. There are no diabetic associated symptoms. There are no hypoglycemic complications. There are no diabetic complications. Risk factors for coronary artery disease include obesity and sedentary lifestyle. Current diabetic treatment includes insulin injections. Her weight is increasing steadily. An ACE inhibitor/angiotensin II receptor blocker is being taken. 110-200 +  Oral pill and trulicity weekly. She has been to weight loss clinic in past with diet and their meals, she got tired of their food and was trying on her own. She has gained some weight back. She has stopped exercising. Not really watching diet. Her arthritis pain is worse. She tried  Marijuana gummies but they made her too hungry.    LDL Cholesterol (mg/dL)   Date Value   2019 118     LDL Calculated (mg/dL)   Date Value   2011 89       (goal LDL is <100)   AST (U/L)   Date Value   2019 36 (H)     ALT (U/L)   Date Value   2019 55 (H)     BUN (mg/dL)   Date Value   2019 12     BP Readings from Last 3 Encounters:   20 132/80   20 (!) 160/80   20 (!) 140/82          (goal 120/80)    Past Medical History:   Diagnosis Date    Acute left-sided low back pain with left-sided sciatica     Arthritis     BMI 70 and over, adult ([address]     Cancer ([facility] 2013    Diabetes mellitus ([facility]     High blood pressure     Hypertension     Sleep apnea       Past Surgical History:   Procedure Laterality Date     SECTION      HYSTERECTOMY, TOTAL ABDOMINAL      OTHER SURGICAL HISTORY      [id number] multiple procedures to remove cancer       Family History   Problem Relation Age of Onset    Cancer Mother         thyroid    Diabetes Mother     Anemia Other     Cancer Other     Breast Cancer Other     Diabetes Other     High Blood Pressure Other     Heart Disease Other     Hypertension Father        Social History     Tobacco Use    Smoking status: Never Smoker    Smokeless tobacco: Never Used   Substance Use Topics    Alcohol use: No      Current Outpatient Medications   Medication Sig Dispense Refill    Insulin Pen Needle (B-D ULTRAFINE III SHORT PEN) 31G X 8 MM MISC Inject 1 each into the skin daily May substitute with any brand 100 each 11    Dulaglutide (TRULICITY) 1.5 VY/2.9QS SOPN INJECT 1.5 MG INTO THE SKIN ONCE A WEEK 12 pen 3    lisinopril (PRINIVIL;ZESTRIL) 2.5 MG tablet TAKE 1 TABLET BY MOUTH EVERY DAY 90 tablet 1    metFORMIN (GLUCOPHAGE) 500 MG tablet Take 1 tablet by mouth 2 times daily (with meals) 180 tablet 3    triamterene-hydroCHLOROthiazide (MAXZIDE) 75-50 MG per tablet Take 1 tablet by mouth daily 90 tablet 3    insulin detemir (LEVEMIR FLEXTOUCH) 100 UNIT/ML injection pen Inject 50 Units into the skin daily 48 mL 1    blood glucose test strips (ASCENSIA AUTODISC VI;ONE TOUCH ULTRA TEST VI) strip Test BID On insulin, type 2  strip 11    Lancets MISC Test  each 11    Saline (SIMPLY SALINE) 0.9 % AERS 2 sprays by Nasal route 4 times daily  0     No current facility-administered medications for this visit.       Allergies   Allergen Reactions    Latex Itching and Rash    Penicillins Anaphylaxis and Other (See Comments)     Passes out, all penicillins        Health Maintenance   Topic Date Due    HIV screen  11/04/1981    Hepatitis B vaccine (1 of 3 - Risk 3-dose series) 11/04/1985    Breast cancer screen  11/04/2016    Shingles Vaccine (1 of 2) 11/04/2016    Colon cancer screen colonoscopy  11/04/2016  Lipid screen  07/09/2020    Potassium monitoring  07/21/2020    Creatinine monitoring  07/21/2020    Diabetic retinal exam  10/24/2020    Diabetic foot exam  01/22/2021    Diabetic microalbuminuria test  01/22/2021    A1C test (Diabetic or Prediabetic)  12/21/2021    DTaP/Tdap/Td vaccine (2 - Td) 10/27/2027    Flu vaccine  Completed    Pneumococcal 0-64 years Vaccine  Completed    Hepatitis A vaccine  Aged Out    Hib vaccine  Aged Out    Meningococcal (ACWY) vaccine  Aged Out       Subjective:      Review of Systems   Constitutional: Negative. Musculoskeletal: Positive for arthralgias and back pain. Topical CBD does not helps her arthritis. Objective:     /80   Pulse 116   Temp 96.9 °F (36.1 °C)   Ht 5' 3\" (1.6 m)   Wt (!) 368 lb 6.4 oz (167.1 kg)   SpO2 98%   BMI 65.26 kg/m²   Physical Exam  Vitals signs and nursing note reviewed. Constitutional:       General: She is not in acute distress. Appearance: She is well-developed. She is obese. She is not ill-appearing. HENT:      Head: Normocephalic and atraumatic. Right Ear: External ear normal.      Left Ear: External ear normal.   Eyes:      General: No scleral icterus. Right eye: No discharge. Left eye: No discharge. Conjunctiva/sclera: Conjunctivae normal.      Pupils: Pupils are equal, round, and reactive to light. Neck:      Thyroid: No thyromegaly. Trachea: No tracheal deviation. Cardiovascular:      Rate and Rhythm: Normal rate and regular rhythm. Heart sounds: Normal heart sounds. Pulmonary:      Effort: Pulmonary effort is normal. No respiratory distress. Breath sounds: Normal breath sounds. No wheezing. Lymphadenopathy:      Cervical: No cervical adenopathy. Skin:     General: Skin is warm. Findings: No rash. Neurological:      Mental Status: She is alert and oriented to person, place, and time.    Psychiatric:         Mood and Affect: Mood normal.

## 2021-03-14 ENCOUNTER — PATIENT MESSAGE (OUTPATIENT)
Dept: PRIMARY CARE CLINIC | Age: 56
End: 2021-03-14

## 2021-03-19 ENCOUNTER — TELEPHONE (OUTPATIENT)
Dept: PRIMARY CARE CLINIC | Age: 56
End: 2021-03-19

## 2021-03-19 NOTE — TELEPHONE ENCOUNTER
PT INSURANCE WOULD LIKE TO KNOW IF naltrexone-buPROPion (CONTRAVE) 8-90 MG per extended release tablet  Medication can be changed to a formulary drug [place].     Please advise

## 2021-03-19 NOTE — TELEPHONE ENCOUNTER
Please call patient and find out if she wants to go on saxenda shots or not. Her insurance is inquiring.

## 2021-03-22 NOTE — TELEPHONE ENCOUNTER
saxenda is basically the same thing as trulicity.  So she would have to go off trulicity weekly and go on victoza daily shots ( which is the same thing at [facility])   Personally I think she should go on contrave

## 2021-03-22 NOTE — TELEPHONE ENCOUNTER
Pt asking if you feel this is good for her? Wants to do what you think is best, since she is diabetic. Please advise.

## 2021-03-22 NOTE — TELEPHONE ENCOUNTER
Pt asking, if she goes with the contrave, will she continue with the Trulicity? Also asked if you are familiar with Plenity? Pt apologized for all the issues with her ins and all the questions. Told her, that's what were here for and we are very familiar re ins.

## 2021-03-22 NOTE — TELEPHONE ENCOUNTER
Yes you can take Contrave with Trulicity. Plenity is a device not a medication.  It expands in your stomach and gut  I can't really recommend it right now

## 2021-04-05 ENCOUNTER — VIRTUAL VISIT (OUTPATIENT)
Dept: PULMONOLOGY | Age: 56
End: 2021-04-05
Payer: COMMERCIAL

## 2021-04-05 ENCOUNTER — PATIENT MESSAGE (OUTPATIENT)
Dept: PULMONOLOGY | Age: 56
End: 2021-04-05

## 2021-04-05 DIAGNOSIS — G47.33 OSA TREATED WITH BIPAP: Primary | ICD-10-CM

## 2021-04-05 DIAGNOSIS — I10 ESSENTIAL HYPERTENSION: ICD-10-CM

## 2021-04-05 DIAGNOSIS — E66.01 CLASS 3 SEVERE OBESITY DUE TO EXCESS CALORIES WITH SERIOUS COMORBIDITY AND BODY MASS INDEX (BMI) OF 60.0 TO 69.9 IN ADULT (HCC): ICD-10-CM

## 2021-04-05 PROCEDURE — 99441 PR PHYS/QHP TELEPHONE EVALUATION 5-10 MIN: CPT | Performed by: INTERNAL MEDICINE

## 2021-04-05 RX ORDER — TRIAMTERENE AND HYDROCHLOROTHIAZIDE 75; 50 MG/1; MG/1
TABLET ORAL
Qty: 90 TABLET | Refills: 3 | Status: SHIPPED | OUTPATIENT
Start: 2021-04-05 | End: 2021-06-16 | Stop reason: SDUPTHER

## 2021-04-05 ASSESSMENT — SLEEP AND FATIGUE QUESTIONNAIRES
HOW LIKELY ARE YOU TO NOD OFF OR FALL ASLEEP WHILE SITTING AND TALKING TO SOMEONE: 0
HOW LIKELY ARE YOU TO NOD OFF OR FALL ASLEEP WHILE LYING DOWN TO REST IN THE AFTERNOON WHEN CIRCUMSTANCES PERMIT: 3
HOW LIKELY ARE YOU TO NOD OFF OR FALL ASLEEP WHILE SITTING INACTIVE IN A PUBLIC PLACE: 0
HOW LIKELY ARE YOU TO NOD OFF OR FALL ASLEEP WHILE SITTING AND READING: 2
HOW LIKELY ARE YOU TO NOD OFF OR FALL ASLEEP WHEN YOU ARE A PASSENGER IN A CAR FOR AN HOUR WITHOUT A BREAK: 0

## 2021-04-05 ASSESSMENT — ENCOUNTER SYMPTOMS
APNEA: 1
GASTROINTESTINAL NEGATIVE: 1
EYES NEGATIVE: 1
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
## 2021-04-13 ENCOUNTER — PATIENT MESSAGE (OUTPATIENT)
Dept: PULMONOLOGY | Age: 56
End: 2021-04-13

## 2021-04-13 NOTE — TELEPHONE ENCOUNTER
From: Max Garcia  To: Dorthula Sacks, DO  Sent: 4/13/2021 2:41 PM EDT  Subject: Prescription Question    Hi, [facility] can't find a prescription for a new bipap and supplies. They asked if you could please fax it to them at [phone].      Thank you

## 2021-04-29 ENCOUNTER — TELEPHONE (OUTPATIENT)
Dept: PRIMARY CARE CLINIC | Age: 56
End: 2021-04-29

## 2021-06-16 DIAGNOSIS — E11.9 TYPE 2 DIABETES MELLITUS WITHOUT COMPLICATION, WITH LONG-TERM CURRENT USE OF INSULIN (HCC): ICD-10-CM

## 2021-06-16 DIAGNOSIS — I10 ESSENTIAL HYPERTENSION: ICD-10-CM

## 2021-06-16 DIAGNOSIS — Z79.4 TYPE 2 DIABETES MELLITUS WITHOUT COMPLICATION, WITH LONG-TERM CURRENT USE OF INSULIN (HCC): ICD-10-CM

## 2021-06-16 RX ORDER — DULAGLUTIDE 1.5 MG/.5ML
INJECTION, SOLUTION SUBCUTANEOUS
Qty: 4 PEN | Refills: 4 | Status: SHIPPED | OUTPATIENT
Start: 2021-06-16 | End: 2021-09-25 | Stop reason: SDUPTHER

## 2021-06-16 RX ORDER — INSULIN DETEMIR 100 [IU]/ML
50 INJECTION, SOLUTION SUBCUTANEOUS DAILY
Qty: 48 ML | Refills: 1 | Status: SHIPPED | OUTPATIENT
Start: 2021-06-16 | End: 2021-09-25 | Stop reason: SDUPTHER

## 2021-06-16 RX ORDER — LISINOPRIL 2.5 MG/1
TABLET ORAL
Qty: 90 TABLET | Refills: 1 | Status: SHIPPED | OUTPATIENT
Start: 2021-06-16 | End: 2021-09-25 | Stop reason: SDUPTHER

## 2021-06-16 RX ORDER — TRIAMTERENE AND HYDROCHLOROTHIAZIDE 75; 50 MG/1; MG/1
TABLET ORAL
Qty: 90 TABLET | Refills: 3 | Status: SHIPPED | OUTPATIENT
Start: 2021-06-16 | End: 2021-09-25 | Stop reason: SDUPTHER

## 2021-09-10 ENCOUNTER — TELEPHONE (OUTPATIENT)
Dept: PRIMARY CARE CLINIC | Age: 56
End: 2021-09-10
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
## 2021-09-14 ENCOUNTER — TELEMEDICINE (OUTPATIENT)
Dept: PULMONOLOGY | Age: 56
End: 2021-09-14
Payer: COMMERCIAL

## 2021-09-14 DIAGNOSIS — G47.33 OSA TREATED WITH BIPAP: Primary | ICD-10-CM

## 2021-09-14 DIAGNOSIS — E66.01 CLASS 3 SEVERE OBESITY DUE TO EXCESS CALORIES WITH SERIOUS COMORBIDITY AND BODY MASS INDEX (BMI) OF 60.0 TO 69.9 IN ADULT (HCC): ICD-10-CM

## 2021-09-14 PROCEDURE — 99213 OFFICE O/P EST LOW 20 MIN: CPT | Performed by: INTERNAL MEDICINE

## 2021-09-14 PROCEDURE — VIRTUALHLTH VIRTUAL HEALTH SAME DAY: Performed by: INTERNAL MEDICINE

## 2021-09-14 ASSESSMENT — ENCOUNTER SYMPTOMS
RESPIRATORY NEGATIVE: 1
GASTROINTESTINAL NEGATIVE: 1
EYES NEGATIVE: 1
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
## 2021-09-14 NOTE — PATIENT INSTRUCTIONS
Faxing DME order to [facility] with office note. [facility]    [facility]   FAXED 9/15/21 [facility]   MAILED AVS

## 2021-09-17 ENCOUNTER — PATIENT MESSAGE (OUTPATIENT)
Dept: PRIMARY CARE CLINIC | Age: 56
End: 2021-09-17
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
## 2021-09-25 ENCOUNTER — OFFICE VISIT (OUTPATIENT)
Dept: PRIMARY CARE CLINIC | Age: 56
End: 2021-09-25
Payer: COMMERCIAL

## 2021-09-25 VITALS
BODY MASS INDEX: 51.91 KG/M2 | HEART RATE: 131 BPM | HEIGHT: 63 IN | WEIGHT: 293 LBS | SYSTOLIC BLOOD PRESSURE: 144 MMHG | DIASTOLIC BLOOD PRESSURE: 96 MMHG | OXYGEN SATURATION: 98 %

## 2021-09-25 DIAGNOSIS — Z79.4 TYPE 2 DIABETES MELLITUS WITHOUT COMPLICATION, WITH LONG-TERM CURRENT USE OF INSULIN (HCC): ICD-10-CM

## 2021-09-25 DIAGNOSIS — I10 ESSENTIAL HYPERTENSION: ICD-10-CM

## 2021-09-25 DIAGNOSIS — E11.9 TYPE 2 DIABETES MELLITUS WITHOUT COMPLICATION, WITH LONG-TERM CURRENT USE OF INSULIN (HCC): ICD-10-CM

## 2021-09-25 DIAGNOSIS — E11.9 TYPE 2 DIABETES MELLITUS WITHOUT COMPLICATION, WITHOUT LONG-TERM CURRENT USE OF INSULIN (HCC): Primary | ICD-10-CM

## 2021-09-25 DIAGNOSIS — R09.81 NASAL CONGESTION: ICD-10-CM

## 2021-09-25 DIAGNOSIS — Z23 NEED FOR VACCINATION: ICD-10-CM

## 2021-09-25 LAB — HBA1C MFR BLD: 8.1 %

## 2021-09-25 PROCEDURE — 3052F HG A1C>EQUAL 8.0%<EQUAL 9.0%: CPT | Performed by: FAMILY MEDICINE

## 2021-09-25 PROCEDURE — 90732 PPSV23 VACC 2 YRS+ SUBQ/IM: CPT | Performed by: FAMILY MEDICINE

## 2021-09-25 PROCEDURE — 99214 OFFICE O/P EST MOD 30 MIN: CPT | Performed by: FAMILY MEDICINE

## 2021-09-25 PROCEDURE — 90674 CCIIV4 VAC NO PRSV 0.5 ML IM: CPT | Performed by: FAMILY MEDICINE

## 2021-09-25 PROCEDURE — 90471 IMMUNIZATION ADMIN: CPT | Performed by: FAMILY MEDICINE

## 2021-09-25 PROCEDURE — 83036 HEMOGLOBIN GLYCOSYLATED A1C: CPT | Performed by: FAMILY MEDICINE

## 2021-09-25 PROCEDURE — 90472 IMMUNIZATION ADMIN EACH ADD: CPT | Performed by: FAMILY MEDICINE

## 2021-09-25 RX ORDER — LISINOPRIL 10 MG/1
10 TABLET ORAL DAILY
Qty: 90 TABLET | Refills: 2 | Status: SHIPPED | OUTPATIENT
Start: 2021-09-25 | End: 2022-05-23 | Stop reason: SDUPTHER

## 2021-09-25 RX ORDER — TRIAMTERENE AND HYDROCHLOROTHIAZIDE 75; 50 MG/1; MG/1
TABLET ORAL
Qty: 90 TABLET | Refills: 3 | Status: SHIPPED | OUTPATIENT
Start: 2021-09-25 | End: 2022-10-25

## 2021-09-25 RX ORDER — DULAGLUTIDE 1.5 MG/.5ML
INJECTION, SOLUTION SUBCUTANEOUS
Qty: 4 PEN | Refills: 4 | Status: SHIPPED | OUTPATIENT
Start: 2021-09-25 | End: 2022-05-19 | Stop reason: DRUGHIGH

## 2021-09-25 RX ORDER — INSULIN DETEMIR 100 [IU]/ML
70 INJECTION, SOLUTION SUBCUTANEOUS DAILY
Qty: 63 ML | Refills: 3 | Status: SHIPPED | OUTPATIENT
Start: 2021-09-25 | End: 2022-02-17 | Stop reason: SDUPTHER

## 2021-09-25 RX ORDER — FLUTICASONE PROPIONATE 50 MCG
2 SPRAY, SUSPENSION (ML) NASAL DAILY
Qty: 48 G | Refills: 1 | Status: SHIPPED | OUTPATIENT
Start: 2021-09-25 | End: 2022-01-24 | Stop reason: ALTCHOICE

## 2021-09-25 RX ORDER — LISINOPRIL 2.5 MG/1
TABLET ORAL
Qty: 90 TABLET | Refills: 1 | Status: SHIPPED | OUTPATIENT
Start: 2021-09-25 | End: 2021-09-25 | Stop reason: SDUPTHER

## 2021-09-25 NOTE — PATIENT INSTRUCTIONS
Patient Education        A Healthy Lifestyle: Care Instructions  Your Care Instructions     A healthy lifestyle can help you feel good, stay at a healthy weight, and have plenty of energy for both work and play. A healthy lifestyle is something you can share with your whole family. A healthy lifestyle also can lower your risk for serious health problems, such as high blood pressure, heart disease, and diabetes. You can follow a few steps listed below to improve your health and the health of your family. Follow-up care is a key part of your treatment and safety. Be sure to make and go to all appointments, and call your doctor if you are having problems. It's also a good idea to know your test results and keep a list of the medicines you take. How can you care for yourself at home? · Do not eat too much sugar, fat, or fast foods. You can still have dessert and treats now and then. The goal is moderation. · Start small to improve your eating habits. Pay attention to portion sizes, drink less juice and soda pop, and eat more fruits and vegetables. ? Eat a healthy amount of food. A 3-ounce serving of meat, for example, is about the size of a deck of cards. Fill the rest of your plate with vegetables and whole grains. ? Limit the amount of soda and sports drinks you have every day. Drink more water when you are thirsty. ? Eat plenty of fruits and vegetables every day. Have an apple or some carrot sticks as an afternoon snack instead of a candy bar. Try to have fruits and/or vegetables at every meal.  · Make exercise part of your daily routine. You may want to start with simple activities, such as walking, bicycling, or slow swimming. Try to be active 30 to 60 minutes every day. You do not need to do all 30 to 60 minutes all at once. For example, you can exercise 3 times a day for 10 or 20 minutes.  Moderate exercise is safe for most people, but it is always a good idea to talk to your doctor before starting an exercise program.  · Keep moving. Poncho Webster the lawn, work in the garden, or [employer]. Take the stairs instead of the elevator at work. · If you smoke, quit. People who smoke have an increased risk for heart attack, stroke, cancer, and other lung illnesses. Quitting is hard, but there are ways to boost your chance of quitting tobacco for good. ? Use nicotine gum, patches, or lozenges. ? Ask your doctor about stop-smoking programs and medicines. ? Keep trying. In addition to reducing your risk of diseases in the future, you will notice some benefits soon after you stop using tobacco. If you have shortness of breath or asthma symptoms, they will likely get better within a few weeks after you quit. · Limit how much alcohol you drink. Moderate amounts of alcohol (up to 2 drinks a day for men, 1 drink a day for women) are okay. But drinking too much can lead to liver problems, high blood pressure, and other health problems. Family health  If you have a family, there are many things you can do together to improve your health. · Eat meals together as a family as often as possible. · Eat healthy foods. This includes fruits, vegetables, lean meats and dairy, and whole grains. · Include your family in your fitness plan. Most people think of activities such as jogging or tennis as the way to fitness, but there are many ways you and your family can be more active. Anything that makes you breathe hard and gets your heart pumping is exercise. Here are some tips:  ? Walk to do errands or to take your child to school or the bus.  ? Go for a family bike ride after dinner instead of watching TV. Where can you learn more? Go to https://ubaldo.healthDivitel. org and sign in to your [employer] account. Enter S961 in the Arieso box to learn more about \"A Healthy Lifestyle: Care Instructions. \"     If you do not have an account, please click on the \"Sign Up Now\" link.   Current as of: June 16, 2021               Content Version: 13.0  © 8454-5616 Healthwise, Incorporated. Care instructions adapted under license by [facility] ([facility]). If you have questions about a medical condition or this instruction, always ask your healthcare professional. [address] any warranty or liability for your use of this information.
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
## 2021-11-02 ENCOUNTER — OFFICE VISIT (OUTPATIENT)
Dept: PRIMARY CARE CLINIC | Age: 56
End: 2021-11-02

## 2021-11-02 VITALS
SYSTOLIC BLOOD PRESSURE: 134 MMHG | OXYGEN SATURATION: 97 % | WEIGHT: 293 LBS | BODY MASS INDEX: 51.91 KG/M2 | HEIGHT: 63 IN | HEART RATE: 108 BPM | DIASTOLIC BLOOD PRESSURE: 72 MMHG

## 2021-11-02 DIAGNOSIS — Z12.11 COLON CANCER SCREENING: ICD-10-CM

## 2021-11-02 DIAGNOSIS — L91.8 SKIN TAG: Primary | ICD-10-CM

## 2021-11-02 DIAGNOSIS — E11.9 TYPE 2 DIABETES MELLITUS WITHOUT COMPLICATION, WITHOUT LONG-TERM CURRENT USE OF INSULIN (HCC): ICD-10-CM

## 2021-11-02 PROCEDURE — 99999 PR OFFICE/OUTPT VISIT,PROCEDURE ONLY: CPT | Performed by: FAMILY MEDICINE

## 2021-11-02 ASSESSMENT — PATIENT HEALTH QUESTIONNAIRE - PHQ9
SUM OF ALL RESPONSES TO PHQ QUESTIONS 1-9: 0
1. LITTLE INTEREST OR PLEASURE IN DOING THINGS: 0
2. FEELING DOWN, DEPRESSED OR HOPELESS: 0
SUM OF ALL RESPONSES TO PHQ9 QUESTIONS 1 & 2: 0
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
## 2021-11-08 ENCOUNTER — TELEPHONE (OUTPATIENT)
Dept: BARIATRICS/WEIGHT MGMT | Age: 56
End: 2021-11-08

## 2021-12-02 ENCOUNTER — PROCEDURE VISIT (OUTPATIENT)
Dept: PRIMARY CARE CLINIC | Age: 56
End: 2021-12-02
Payer: COMMERCIAL

## 2021-12-02 VITALS
SYSTOLIC BLOOD PRESSURE: 136 MMHG | HEIGHT: 63 IN | BODY MASS INDEX: 51.91 KG/M2 | WEIGHT: 293 LBS | DIASTOLIC BLOOD PRESSURE: 84 MMHG | OXYGEN SATURATION: 96 % | HEART RATE: 113 BPM

## 2021-12-02 DIAGNOSIS — L91.8 CUTANEOUS SKIN TAGS: Primary | ICD-10-CM

## 2021-12-02 PROCEDURE — 11201 RMVL SKIN TAGS EA ADDL 10: CPT | Performed by: FAMILY MEDICINE

## 2021-12-02 PROCEDURE — 11200 RMVL SKIN TAGS UP TO&INC 15: CPT | Performed by: FAMILY MEDICINE

## 2021-12-02 NOTE — PROGRESS NOTES
[facility] Primary Care  [address]  Phone: [phone]  Fax: [phone]    Emily Santiago is a 55 y.o. female who presents today for Skin Tag Removal.    Consent form was signed. Patient understands that this is not covered by her insurance. Patient has multiple irritating skin tags on her neck. The right and left side of the neck had multiple tags in the center of the neck. After Cleaning with alcohol, #7 skin tags were anesthestized with 2% Lidocaine without epi  and 28 were not anesthestized. All skin tags snipped with Iris Scissors. drysol  usd for hemostasis as needed. Patient tolerated procedure well      Plan:  Keep area(s) dry today then cleanse twice daily and apply Neosporin or Vaseline petroleum jelly twice daily for 5 days,      Diagnosis Orders   1. Cutaneous skin tags  91598 - [place] REMOVAL OF SKIN TAGS, UP TO 15    73483 - [place] REMOVAL OF SKIN TAGS, EACH ADD 10    87590 - [place] REMOVAL OF SKIN TAGS, EACH ADD 10     No follow-ups on file.     Electronically signed by Sasha Villalta MD on 12/2/21 at 9:54 AM EST

## 2021-12-06 ENCOUNTER — PATIENT MESSAGE (OUTPATIENT)
Dept: PRIMARY CARE CLINIC | Age: 56
End: 2021-12-06

## 2021-12-06 DIAGNOSIS — G89.29 CHRONIC LOW BACK PAIN, UNSPECIFIED BACK PAIN LATERALITY, UNSPECIFIED WHETHER SCIATICA PRESENT: Primary | ICD-10-CM

## 2021-12-06 DIAGNOSIS — M54.50 CHRONIC LOW BACK PAIN, UNSPECIFIED BACK PAIN LATERALITY, UNSPECIFIED WHETHER SCIATICA PRESENT: Primary | ICD-10-CM

## 2021-12-06 NOTE — TELEPHONE ENCOUNTER
Prescription and physical therapy prescription printing out.   Find out where patient wants to be sent or if she is going to pick them up

## 2021-12-16 ENCOUNTER — OFFICE VISIT (OUTPATIENT)
Dept: BARIATRICS/WEIGHT MGMT | Age: 56
End: 2021-12-16
Payer: COMMERCIAL

## 2021-12-16 VITALS
HEIGHT: 64 IN | BODY MASS INDEX: 50.02 KG/M2 | RESPIRATION RATE: 20 BRPM | SYSTOLIC BLOOD PRESSURE: 130 MMHG | HEART RATE: 72 BPM | WEIGHT: 293 LBS | DIASTOLIC BLOOD PRESSURE: 80 MMHG

## 2021-12-16 DIAGNOSIS — I10 ESSENTIAL HYPERTENSION: ICD-10-CM

## 2021-12-16 DIAGNOSIS — E11.9 TYPE 2 DIABETES MELLITUS WITHOUT COMPLICATION, WITH LONG-TERM CURRENT USE OF INSULIN (HCC): Primary | ICD-10-CM

## 2021-12-16 DIAGNOSIS — G47.33 OSA (OBSTRUCTIVE SLEEP APNEA): ICD-10-CM

## 2021-12-16 DIAGNOSIS — E66.01 MORBID OBESITY WITH BMI OF 60.0-69.9, ADULT (HCC): ICD-10-CM

## 2021-12-16 DIAGNOSIS — Z79.4 TYPE 2 DIABETES MELLITUS WITHOUT COMPLICATION, WITH LONG-TERM CURRENT USE OF INSULIN (HCC): Primary | ICD-10-CM

## 2021-12-16 PROCEDURE — 3052F HG A1C>EQUAL 8.0%<EQUAL 9.0%: CPT | Performed by: SURGERY

## 2021-12-16 PROCEDURE — 99214 OFFICE O/P EST MOD 30 MIN: CPT | Performed by: SURGERY

## 2021-12-16 NOTE — PROGRESS NOTES
[facility] PHYSICIANS  [facility]  [address] [place] [address] [place]  [address] [place] [id number]  Dept: [phone]    SURGICAL WEIGHT MANAGEMENT PROGRAM  PROGRESS NOTE INITIAL EVALUATION     Patient: Ben Kohli        Service Date: 2021      HPI:     Chief Complaint   Patient presents with    Bariatric, Initial Visit    Weight Loss       The patient is a pleasant 55y.o. year old female  with morbid obesity, who stands Height: 5' 4\" (162.6 cm) tall with a weight of Weight: (!) 366 lb (166 kg) , resulting in a BMI of Body mass index is 62.82 kg/m². . The patient suffers from multiple co-morbidities as a result of morbid obesity, including: Type 2 Diabetes Mellitus, Hypertension, Obstructive Sleep Apnea, GERD and Degenerative Joint Disease (DJD). She has suffered from obesity for many years. She does complain of abdominal pain with meals and indigestion. She does have CPAP and sees Dr. Patti Carmen. The patient denies  a history of myocardial infarction, deep vein thrombosis, pulmonary embolism, renal failure and hepatic failure. The patient has failed multiple attempts at non-surgical weight loss, and is now seeking surgical intervention to promote permanent and consistent weight loss. She  has chosen Sleeve Gastrectomy. She is well educated regarding it, as she has recently viewed our weight loss surgery informational seminar .      Medical History:  Past Medical History:   Diagnosis Date    Acute left-sided low back pain with left-sided sciatica     Arthritis     BMI 70 and over, adult ([address]     Cancer ([address]     Diabetes mellitus ([address]     High blood pressure     Hypertension     Sleep apnea        Surgical History:  Past Surgical History:   Procedure Laterality Date     SECTION      HYSTERECTOMY, TOTAL ABDOMINAL      OTHER SURGICAL HISTORY      [id number] multiple procedures to remove cancer       Family History:      Problem Relation Age of Onset    Cancer Mother thyroid    Diabetes Mother     Anemia Other     Cancer Other     Breast Cancer Other     Diabetes Other     High Blood Pressure Other     Heart Disease Other     Hypertension Father        Social History:   Social History     Tobacco Use    Smoking status: Never Smoker    Smokeless tobacco: Never Used   Vaping Use    Vaping Use: Never used   Substance Use Topics    Alcohol use: No    Drug use: No       Current Med List:  Current Outpatient Medications   Medication Sig Dispense Refill    Handicap Placard MISC by Does not apply route For orthopedic disease. Expires 12/6/2026 1 each 0    Dulaglutide (TRULICITY) 1.5 RO/2.4KP SOPN Inject 1.5 MG into the skin once a week. 4 pen 4    metFORMIN (GLUCOPHAGE) 500 MG tablet Take 1 tablet by mouth 2 times daily (with meals) 180 tablet 3    insulin detemir (LEVEMIR FLEXTOUCH) 100 UNIT/ML injection pen Inject 70 Units into the skin daily 63 mL 3    triamterene-hydroCHLOROthiazide (MAXZIDE) 75-50 MG per tablet TAKE 1 TABLET BY MOUTH EVERY DAY 90 tablet 3    fluticasone (FLONASE) 50 MCG/ACT nasal spray 2 sprays by Each Nostril route daily 48 g 1    lisinopril (PRINIVIL;ZESTRIL) 10 MG tablet Take 1 tablet by mouth daily TAKE 1 TABLET BY MOUTH EVERY DAY 90 tablet 2    Lancets MISC 1 each by Does not apply route 3 times daily 100 each 5    blood glucose monitor strips Test 3  times a day & as needed for symptoms of irregular blood glucose. Dispense sufficient amount for indicated testing frequency plus additional to accommodate PRN testing needs. Type 2 DM on insulin 100 strip 3    Insulin Pen Needle (B-D ULTRAFINE III SHORT PEN) 31G X 8 MM MISC Inject 1 each into the skin daily May substitute with any brand 100 each 11    Saline (SIMPLY SALINE) 0.9 % AERS 2 sprays by Nasal route 4 times daily  0     No current facility-administered medications for this visit.         Allergies   Allergen Reactions    Latex Itching and Rash    Penicillins Anaphylaxis and Other (See Comments)     Passes out, all penicillins        SOCIAL:      This patient is alone for the evaluation today. [] HIV Risk Factors (i.e.) intravenous drug abuser; at risk sexual behavior; received blood products    [] TB Risk Factors (i.e.) Medically underserved, institutional care, foreign born, endemic area; exposure to active case    [] Hepatitis B&C Risk Factors (i.e.) Received blood transfusion prior to 1992; recreational drug use; high risk sexual behaviors; tattoos or body piercings; contact with blood or needle sticks in the workplace    Comprehension    Ability to grasp concepts and respond to questions:   [x] High   [] Medium   [] Low    Motivation    [x] Asks Questions; eager to learn   [] Needs education   [] Extreme anxiety    [] uncooperative   [] Denies need for education    English Speaking Ability    [x] Speaks English well   [x] Reads English well   [x] Understands spoken english    [x] Understands written English   [] No need for interpretive support      [] Might benefit from interpretive support   []  required for all services     REVIEW OF SYSTEMS: (Negative unless marked otherwise)     See review of Systems scanned into media    PRESENT ILLNESS:     Weight Parameters  Weight (!) 366 lb (166 kg)   Height 5' 4\" (1.626 m)   BMI Body mass index is 62.82 kg/m².    IBW     EBW               IMMUNIZATION STATUS  Immunization History   Administered Date(s) Administered    COVID-19, Pfizer, PF, 30mcg/0.3mL 03/19/2021, 04/09/2021, 10/09/2021    Influenza A ([id number]) Vaccine PF IM 11/30/2009    Influenza Virus Vaccine 11/01/2016, 10/27/2017, 09/26/2018    Influenza, MDCK Quadv, IM, PF (Flucelvax 2 yrs and older) 09/25/2021    Influenza, [place] Gian, IM, PF (6 mo and older Fluzone, Flulaval, Fluarix, and 3 yrs and older Afluria) 11/01/2016, 10/27/2017, 09/26/2018, 08/26/2019, 09/08/2020    Pneumococcal Polysaccharide (Auymcylkb01) 09/11/2000, 09/25/2021    Tdap (Boostrix, Adacel) 10/27/2017       FALLS ASSESSMENT    [] LOW RISK FOR FALLS    [] MODERATE RISK FOR FALLS    [] Difficulty walking/selfcare    [] Falls in the past 2 months    [] Suspicion of Clinician    [] Other:      SMOKING CESSATION     [] Not needed     [] Instructed to stop smoking    [] Pamphlet community resources given     VTE SCREEN    [] Family hx DVT/PE  /   [] Personal hx of DVT/PE    [x] Denies any family or personal hx of DVT/PE    Physician Review    [x] Past medical, family, & social history reviewed and discussed with patient. Review of surgery and post-surgical changes (by surgeon for surgical patients only)    [x] Lifelong diet expectations reviewed with patient    [x] Need for lifelong vitamin supplementation reviewed with patient    PHYSICAL EXAMINATION:      /80 (Site: Left Upper Arm, Position: Sitting, Cuff Size: Large Adult)   Pulse 72   Resp 20   Ht 5' 4\" (1.626 m)   Wt (!) 366 lb (166 kg)   BMI 62.82 kg/m²     Constitutional:  Vital signs are normal. The patient appears well-developed   HEENT:      Head: Normocephalic. Atraumatic     Eyes: pupils are equal and reactive. No scleral icterus is present. Neck: No mass and no thyromegaly present. Cardiovascular: Normal rate, regular rhythm, S1 normal and S2 normal.  Bilateral pulses present. Pulmonary/Chest: Effort normal and breath sounds normal. No retractions. Abdominal: Soft. Normal appearance. There is no organomegaly. No tenderness. There is no rigidity, no rebound, no guarding and no Lagunas's sign. Musculoskeletal:      Right lower leg: Normal. No tenderness and no edema. Left lower leg: Normal. No tenderness and no edema. Lymphadenopathy:     No cervical adenopathy, No Exrtemity Adenopathy. Neurological: The patient is alert and oriented. Moving all four extremities equally, sensation grossly intact bilateral.  Skin: Skin is warm, dry and intact. Psychiatric: The patient has a normal mood and affect.  Speech is normal and behavior is normal. Judgment and thought content normal. Cognition and memory are normal.     RECOMMENDATIONS:     We spent a great deal of time discussing the risks and benefits of Sleeve Gastrectomy or Gastric Bypass, including but not limited to injury to intra-abdominal organs, breakdown of the gastric staple line, the need for re-operative therapy,  prolonged hospitalization,  mechanical ventilation,  and death. We discussed the possibility of bleeding, the need for blood transfusions, blood clots, hospital-acquired and intra-abdominal infection, anastomotic stricture, and worsening GERD. And we discussed the need for post-operative visit compliance, behavior modifications and diet changes, protein and vitamin supplementation, as well as routine scheduled and dedicated exercise. I instructed the patient to utilize the exercise log that will be given to them at their fist dietician appointment. We discussed the potential weight loss benefit of approximately 50-60/60-70% of her excess body weight at 12-18 months post-op, as well as the possibility of insufficient weight loss or weight gain after 2 years post-operative time. Discussed the risk of substance abuse and or nicotine abuse today with patient. They expressed understanding of the risks of abuse of such drugs. PLAN:       Diagnosis Orders   1. KIANA (obstructive sleep apnea)     2. Type 2 diabetes mellitus without complication, with long-term current use of insulin ([facility]     3. Essential hypertension     4.  Morbid obesity with BMI of 60.0-69.9, adult (HCC)            Initial Testing     Primary Procedure: Sleeve Gastrectomy/ Gastric bypass    Labwork: Initial Pre-surgical Lab Tests (CMP, TSH, Fasting Lipid Profile, Mg, Zinc, Vit B1 (whole blood), Vit B12, 25-OH Vit D, Fe,  Ferritin,  Folate) and Negative serum nicotine prior to submission for pre-auth to rule out nutritional deficiencies with morbid obesity and BMI 62    Endoscopic Studies: Upper GI Endoscopy for abdominal pain and Indigestion which has been untreated. Psychological Assessment: Psychological Evaluation and Clearance to rule out eating disorder with BMI 62    Nutrition Assessment: Bariatric Nutrition Assessment and Clearance    Pulmonary Evaluation: CPAP Settings    Other  Consultations: Medical clearanc with KIANA and morbid obesity    Physician Supervised Diet and Exercise required by the patients insurance company: 6 months.       Surgical Diet requirement:  2 weeks      Final Testing  Screening Chest Xray  and EKG within 6 months of date of surgery    Labwork:  Final Lab Tests  within 3 months of date of surgery (CBC, PT/PTT, BMP)     Electronically signed by Pankaj Bruce DO on 12/22/2021 at 8:31 PM

## 2021-12-16 NOTE — LETTER
Visit Date: 12/16/2021    Patient: Brendan Walker  YOB: 1965    Dear Dr. Darylene Daisy, MD,      I had the pleasure of seeing Bonifacio Mars in the office today for a consult for weight loss surgery. Her current Weight: (!) 366 lb (166 kg), which gives her a Body mass index is 62.82 kg/m². .  We had a long discussion regarding surgical options for weight loss and improvement in co-morbidities. She is considering a bariatric  procedure. We will start the preoperative workup, which includes bloodwork, psychological evaluation, support group attendance, and preoperative medical clearance from you. We will also initiate pre-certification for surgery, which requires a letter of medical necessity from you and often office notes documenting a weight history and co-morbidities. Bonifacio Mars will require 6 months of medically supervised visits as specified by the patient's insurance. Thank you for allowing me to participate in the care of your patient. If you have any questions or concerns, please do not hesitate to call.       Sincerely,     Marti Leon DO  Director of Bariatric and Minimally Invasive Surgery  [facility] [address] [place], [place], [facility] [place]: [phone]  F: [phone]

## 2021-12-29 ENCOUNTER — NURSE ONLY (OUTPATIENT)
Dept: BARIATRICS/WEIGHT MGMT | Age: 56
End: 2021-12-29

## 2021-12-29 NOTE — PROGRESS NOTES
Medical Nutrition Therapy  Initial Nutrition Assessment for Metabolic/ Bariatric Surgery  Required insurance visit prior to surgery:  6  Shared with patient the importance of documenting exercise and staying at or below start weight during visits. Pt reports:     Ilya Byrnes is a 55 y.o. female with a date of birth of 1965. Weight History: Wt Readings from Last 3 Encounters:   12/16/21 (!) 366 lb (166 kg)   12/02/21 (!) 365 lb 3.2 oz (165.7 kg)   11/02/21 (!) 365 lb 6.4 oz (165.7 kg)        How does your weight affect your daily activities?joint pain, back pain, fatigue and short of breath with activity      What would be different in your life if you felt healthier and fit? Why is that important to you now? Unsure if will be able to lose any more weight  Do you drink alcohol? . NO    Do you use tobacco in the form of cigarettes, cigars, chew or any vapor appliance? No    Weight History      Yamini Barnhart's highest adult weight was 434 lbs at age . Patient was at her highest weight for  . Patient's triggers/known causes to her highest weight are . Yamini Barnhart's lowest adult weight was 129 lbs at age . Patient was at her lowest weight for  . The lowest weight was achieved through  . Physical Activity  Do you participate in a structured exercise program, step counting or regular physical activity? no      Instructions and exercise logs were provided to patient today see goal sheet and plan. Previous weight loss attempts  Patient has participated in the following weight loss programs:   CAMELIA, patricia, [address] [place] [address]      Nutrition History  Have you ever been diagnosed with an eating disorder? No  Have you ever had problems tolerating a multivitamin or mineral supplement?no  Have you ever been diagnosed with a vitamin or mineral deficiency? yes     Patient dines out to a sit down restaurant 2 times per month. Patient dines out to a fast food restaurant 1 times per month.      Patient every 3-5 hours. 15 I will drink 64oz of fluid daily. 16 I will eat slowly during meals and snacks. 17 I will limit fluids 4oz before after and during meals. 18 I will eat protein first at all meals followed by vegetables,  Fruit and lastly whole grains. 23 My first weight neutral approach is:                 20 My second weight neutral approach is:                 21  My Thirds weight neutral approach is:                 22                  23                  24                  25                  Goals reviewed with patient as below  Do you understand your goals? Do you have the information you need to achieve your goals? Do you have any questions  right now? Plan    Exercise for Health 15 easy exercises to do at home with 6 activity logs were provided to the patient with verbal and written instructions on how to carry this out. Goal number 14 was provided to the patient on this visit please see above. Will follow up each month and provide support as patient begins to add physical activity to life style. Monitor and review goals adjust as needed. Follow up monthly supervised diet and exercise.        Sandra Quezada, NISH, LD

## 2021-12-30 ENCOUNTER — PATIENT MESSAGE (OUTPATIENT)
Dept: PRIMARY CARE CLINIC | Age: 56
End: 2021-12-30

## 2022-01-24 ENCOUNTER — TELEMEDICINE (OUTPATIENT)
Dept: BARIATRICS/WEIGHT MGMT | Age: 57
End: 2022-01-24
Payer: COMMERCIAL

## 2022-01-24 DIAGNOSIS — E28.2 PCOS (POLYCYSTIC OVARIAN SYNDROME): ICD-10-CM

## 2022-01-24 DIAGNOSIS — E66.01 MORBID OBESITY WITH BMI OF 60.0-69.9, ADULT (HCC): ICD-10-CM

## 2022-01-24 DIAGNOSIS — Z79.4 TYPE 2 DIABETES MELLITUS WITHOUT COMPLICATION, WITH LONG-TERM CURRENT USE OF INSULIN (HCC): Primary | ICD-10-CM

## 2022-01-24 DIAGNOSIS — Z85.42 HISTORY OF ENDOMETRIAL CANCER: ICD-10-CM

## 2022-01-24 DIAGNOSIS — R74.8 ELEVATED LIVER ENZYMES: ICD-10-CM

## 2022-01-24 DIAGNOSIS — G47.33 OSA (OBSTRUCTIVE SLEEP APNEA): Chronic | ICD-10-CM

## 2022-01-24 DIAGNOSIS — E11.9 TYPE 2 DIABETES MELLITUS WITHOUT COMPLICATION, WITH LONG-TERM CURRENT USE OF INSULIN (HCC): Primary | ICD-10-CM

## 2022-01-24 DIAGNOSIS — I10 ESSENTIAL HYPERTENSION: ICD-10-CM

## 2022-01-24 PROCEDURE — 99213 OFFICE O/P EST LOW 20 MIN: CPT | Performed by: NURSE PRACTITIONER

## 2022-01-24 NOTE — PROGRESS NOTES
Medical Nutrition Therapy   Metabolic and Bariatric Surgery         Supervised diet and exercise preparation  Visit 1 out of 6  Pt reports:      Changes in eating patterns to promote health are noted below on the goals number 19-22    Vitals: Wt Readings from Last 3 Encounters:   12/16/21 (!) 366 lb (166 kg)   12/02/21 (!) 365 lb 3.2 oz (165.7 kg)   11/02/21 (!) 365 lb 6.4 oz (165.7 kg)         Nutrition Assessment:   PES: Knowledge deficit related to healthy behaviors that support weight management post weight loss surgery as evidenced by There is no height or weight on file to calculate BMI. Nutrition Assessment of Goal Attainment:  TREATMENT GOALS:    1. Pt  Completed 4 out of 4 goals. 2.TREATMENT GOALS FOR UPCOMING WEEK: continue all previous goals and add: # 19    All goals were planned with and agreed on by the patient. I want to improve my health because I want to live. appt # NA G What is your next step? C 1 2 3 4 5 6 7 8 9     0  1 I will read the education binder provided to me and the   x 100             0  2 I will make my pschological evaluation appoinment. x 100             1  3 I will bring this goal card to every appointment. 100              x 4 I will eliminate all tobacco/nicotine. x 5 I will limit alcoholic beverages to 0-6VP per week. 6 I will limit dining out to 3 times per week or less. x 7 I will eliminate sugary beverages. x 8 I will eliminate carbonated beverages. 9 I will eliminate drinking with a straw. 10 I will limit caffeinated beverages to 16oz daily. 1  11 I will limit log my exercise daily. 100               12 I will determine my calcium and mvi plan. 13 I will have 1-2 servings of lean protein present at each meal and minimeal.                 14 I will eat every 3-5 hours. 15 I will drink 64oz of fluid daily. 16 I will eat slowly during meals and snacks. 17 I will limit fluids 4oz before after and during meals. 18 I will eat protein first at all meals followed by vegetables,  Fruit and lastly whole grains. 1  23 My first weight neutral approach is:  I will limit eating after 7pm to a minimeal only. 20 My second weight neutral approach is:                 21  My Thirds weight neutral approach is:                 22                  23                  24                  25                    Questions asked for goal understanding:                                                  Do you understand your goals? Do you have the information you need to achieve your goals? Do you have any questions  right now? [x]  Consistent goal achievement in the program thus far and further success with goals is expected. []  Unable to consistently make progress in goal achievement. At this time patient is not moving forward  in developing the skills needed for success after surgery. Plan:    Continue to follow monthly and review goals.          [x]  Nutrition visits complete    []

## 2022-01-24 NOTE — PROGRESS NOTES
Medical Weight Management Progress Note    TELEHEALTH EVALUATION -- Audio/Visual (During VZFMX-01 public health emergency)    Subjective      Patient being seen for medically supervised weight loss for the chronic conditions of Type 2 DM, HTN, PCOS, KIANA, Elevated Liver Enzymes, Hx Endometrial Cancer. She is working on the behavior changes discussed at the initial appointment. Patient continues on diet plan. Physical activity includes walking. Weight today is 357 lbs. Uses CPAP. Psych eval scheduled 3/18/22. Needs to schedule EGD. Diabetes regimen includes Trulicity, metformin, and Levemir insulin. Last A1C 8.1%. No current issues. Working toward bariatric surgery:    [x] Sleeve Gastrectomy                                                           [] Najma-en-Y Gastric Bypass    Allergies: Allergies   Allergen Reactions    Latex Itching and Rash    Penicillins Anaphylaxis and Other (See Comments)     Passes out, all penicillins        Past Medical History:     Past Medical History:   Diagnosis Date    Acute left-sided low back pain with left-sided sciatica     Arthritis     BMI 70 and over, [facility]     Cancer ([facility]     Diabetes mellitus ([facility]     High blood pressure     Hypertension     Sleep apnea    .     Past Surgical History:  Past Surgical History:   Procedure Laterality Date     SECTION      HYSTERECTOMY, TOTAL ABDOMINAL      OTHER SURGICAL HISTORY      [id number] multiple procedures to remove cancer       Family History:  Family History   Problem Relation Age of Onset    Cancer Mother         thyroid    Diabetes Mother     Anemia Other     Cancer Other     Breast Cancer Other     Diabetes Other     High Blood Pressure Other     Heart Disease Other     Hypertension Father        Social History:  Social History     Socioeconomic History    Marital status:      Spouse name: Not on file    Number of children: Not on file    Years of education: Not on file    Highest education level: Not on file   Occupational History    Not on file   Tobacco Use    Smoking status: Never Smoker    Smokeless tobacco: Never Used   Vaping Use    Vaping Use: Never used   Substance and Sexual Activity    Alcohol use: No    Drug use: No    Sexual activity: Not on file   Other Topics Concern    Not on file   Social History Narrative    Not on file     Social Determinants of Health     Financial Resource Strain:     Difficulty of Paying Living Expenses: Not on file   Food Insecurity:     Worried About Running Out of Food in the Last Year: Not on file    Jesus of Food in the Last Year: Not on file   Transportation Needs:     Lack of Transportation (Medical): Not on file    Lack of Transportation (Non-Medical): Not on file   Physical Activity:     Days of Exercise per Week: Not on file    Minutes of Exercise per Session: Not on file   Stress:     Feeling of Stress : Not on file   Social Connections:     Frequency of Communication with Friends and Family: Not on file    Frequency of Social Gatherings with Friends and Family: Not on file    Attends Zoroastrianism Services: Not on file    Active Member of [address] [place] [address] or Organizations: Not on file    Attends Club or Organization Meetings: Not on file    Marital Status: Not on file   Intimate Partner Violence:     Fear of Current or Ex-Partner: Not on file    Emotionally Abused: Not on file    Physically Abused: Not on file    Sexually Abused: Not on file   Housing Stability:     Unable to Pay for Housing in the Last Year: Not on file    Number of [place] in the Last Year: Not on file    Unstable Housing in the Last Year: Not on file       Current Medications:  Current Outpatient Medications   Medication Sig Dispense Refill    Handicap Placard MISC by Does not apply route For orthopedic disease. Expires 12/6/2026 1 each 0    Dulaglutide (TRULICITY) 1.5 LF/5.2GE SOPN Inject 1.5 MG into the skin once a week.  4 pen 4  metFORMIN (GLUCOPHAGE) 500 MG tablet Take 1 tablet by mouth 2 times daily (with meals) 180 tablet 3    insulin detemir (LEVEMIR FLEXTOUCH) 100 UNIT/ML injection pen Inject 70 Units into the skin daily 63 mL 3    triamterene-hydroCHLOROthiazide (MAXZIDE) 75-50 MG per tablet TAKE 1 TABLET BY MOUTH EVERY DAY 90 tablet 3    lisinopril (PRINIVIL;ZESTRIL) 10 MG tablet Take 1 tablet by mouth daily TAKE 1 TABLET BY MOUTH EVERY DAY 90 tablet 2    Lancets MISC 1 each by Does not apply route 3 times daily 100 each 5    blood glucose monitor strips Test 3  times a day & as needed for symptoms of irregular blood glucose. Dispense sufficient amount for indicated testing frequency plus additional to accommodate PRN testing needs. Type 2 DM on insulin 100 strip 3    Insulin Pen Needle (B-D ULTRAFINE III SHORT PEN) 31G X 8 MM MISC Inject 1 each into the skin daily May substitute with any brand 100 each 11    Saline (SIMPLY SALINE) 0.9 % AERS 2 sprays by Nasal route 4 times daily  0     No current facility-administered medications for this visit. Vital Signs: There were no vitals taken for this visit. Review of Systems - A review of systems was performed. All was negative unless otherwise documented in HPI. Constitutional: Negative for fever, chills. HENT: Negative for hearing loss and trouble swallowing. Eyes: Negative for visual disturbance. Respiratory: Negative for cough, shortness of breath. Cardiovascular: Negative for chest pain. Gastrointestinal: Negative for nausea, vomiting, abdominal pain, diarrhea, constipation, blood in stool and abdominal distention. Endocrine: Negative for polydipsia, polyphagia and polyuria. Genitourinary: Negative for dysuria or hematuria. Musculoskeletal: Negative for myalgias, joint swelling. Skin: Negative for pallor and rash. Neurological: Negative for dizziness, light-headedness and headaches.    Psychiatric/Behavioral: Negative for sleep disturbance and dysphoric mood. Objective:      Physical Exam     General: Well-developed and well-nourished. No acute distress. HEENT: Normocephalic. Pulmonary/Chest: Normal effort. Neurological:  Alert and oriented to person, place, and time. Psychiatric: Normal mood and affect. Speech and behavior normal.     Assessment:       Diagnosis Orders   1. Type 2 diabetes mellitus without complication, with long-term current use of insulin ([address]     2. KIANA (obstructive sleep apnea)  Comprehensive Metabolic Panel    TSH without Reflex    T4, Free    Hemoglobin A1C    Vitamin B12 & Folate    Vitamin B1    Vitamin D 25 Hydroxy    Magnesium    Iron and TIBC    Vitamin A    Lipid Panel    PTH, Intact    CBC Auto Differential    Zinc    Ferritin   3. Essential hypertension  Comprehensive Metabolic Panel    TSH without Reflex    T4, Free    Hemoglobin A1C    Vitamin B12 & Folate    Vitamin B1    Vitamin D 25 Hydroxy    Magnesium    Iron and TIBC    Vitamin A    Lipid Panel    PTH, Intact    CBC Auto Differential    Zinc    Ferritin   4. PCOS (polycystic ovarian syndrome)  Comprehensive Metabolic Panel    TSH without Reflex    T4, Free    Hemoglobin A1C    Vitamin B12 & Folate    Vitamin B1    Vitamin D 25 Hydroxy    Magnesium    Iron and TIBC    Vitamin A    Lipid Panel    PTH, Intact    CBC Auto Differential    Zinc    Ferritin   5. History of endometrial cancer  Comprehensive Metabolic Panel    TSH without Reflex    T4, Free    Hemoglobin A1C    Vitamin B12 & Folate    Vitamin B1    Vitamin D 25 Hydroxy    Magnesium    Iron and TIBC    Vitamin A    Lipid Panel    PTH, Intact    CBC Auto Differential    Zinc    Ferritin   6. Elevated liver enzymes  Comprehensive Metabolic Panel    TSH without Reflex    T4, Free    Hemoglobin A1C    Vitamin B12 & Folate    Vitamin B1    Vitamin D 25 Hydroxy    Magnesium    Iron and TIBC    Vitamin A    Lipid Panel    PTH, Intact    CBC Auto Differential    Zinc    Ferritin   7.  Morbid obesity with BMI of 60.0-69.9, adult ([facility])  Comprehensive Metabolic Panel    TSH without Reflex    T4, Free    Hemoglobin A1C    Vitamin B12 & Folate    Vitamin B1    Vitamin D 25 Hydroxy    Magnesium    Iron and TIBC    Vitamin A    Lipid Panel    PTH, Intact    CBC Auto Differential    Zinc    Ferritin       Plan:    Dietitian visit follow up call. Follow-up  Return in about 1 month (around 2/24/2022). Orders this encounter:  Orders Placed This Encounter   Procedures    Comprehensive Metabolic Panel     Standing Status:   Future     Standing Expiration Date:   1/24/2023    TSH without Reflex     Standing Status:   Future     Standing Expiration Date:   1/24/2023    T4, Free     Standing Status:   Future     Standing Expiration Date:   1/24/2023    Hemoglobin A1C     Standing Status:   Future     Standing Expiration Date:   1/24/2023    Vitamin B12 & Folate     Standing Status:   Future     Standing Expiration Date:   1/24/2023    Vitamin B1     Standing Status:   Future     Standing Expiration Date:   1/24/2023    Vitamin D 25 Hydroxy     Standing Status:   Future     Standing Expiration Date:   1/24/2023    Magnesium     Standing Status:   Future     Standing Expiration Date:   1/24/2023    Iron and TIBC     Standing Status:   Future     Standing Expiration Date:   1/24/2023     Order Specific Question:   Is Patient Fasting? Answer:   Yes     Order Specific Question:   No of Hours?      Answer:   12 hours    Vitamin A     Standing Status:   Future     Standing Expiration Date:   1/24/2023    Lipid Panel     Standing Status:   Future     Standing Expiration Date:   1/24/2023     Order Specific Question:   Is Patient Fasting?/# of Hours     Answer:   12 hrs    PTH, Intact     Standing Status:   Future     Standing Expiration Date:   1/24/2023    CBC Auto Differential     Standing Status:   Future     Standing Expiration Date:   1/24/2023    Zinc     Standing Status:   Future     Standing Expiration Date:   1/25/2023    Ferritin     Standing Status:   Future     Standing Expiration Date:   1/25/2023       Prescriptions this encounter:  No orders of the defined types were placed in this encounter. Electronically signed by:  ANGELES Sharpe    Pursuant to the emergency declaration under the [address] [place] [address], [facility] [place] waiver authority and the Coronavirus Preparedness and [employer] Act, this Virtual  Visit was conducted, with patient's consent, to reduce the patient's risk of exposure to COVID-19 and provide continuity of care for an established patient. Services were provided through a video synchronous discussion virtually to substitute for in-person clinic visit. Patient was in her home and provider was in the weight management clinic.

## 2022-02-07 ENCOUNTER — NURSE ONLY (OUTPATIENT)
Dept: BARIATRICS/WEIGHT MGMT | Age: 57
End: 2022-02-07

## 2022-02-14 ENCOUNTER — PATIENT MESSAGE (OUTPATIENT)
Dept: PRIMARY CARE CLINIC | Age: 57
End: 2022-02-14

## 2022-02-14 NOTE — TELEPHONE ENCOUNTER
From: Yane Stephens  To: Dr. Geraldine Tay: 2/14/2022 6:09 AM EST  Subject: Sinus infection     Hi,    I am experiencing quite a bit of sinus drainage and headache especially in the morning sometimes an earache. And, this morning slight tinge of blood from my left nostril when I blow my nose. I also have quite a bit of coughing due the drainage. I have an appointment the 17th. But, wanted to reach out to see if I could ask for an antibiotic or a prescription to relieve it until I see you. I took an at home Covid test and it was negative. Everyone including myself has been vaccinated and boosted. We are all still wearing masks and practicing social distancing as well.      Thank you

## 2022-02-17 ENCOUNTER — OFFICE VISIT (OUTPATIENT)
Dept: PRIMARY CARE CLINIC | Age: 57
End: 2022-02-17
Payer: COMMERCIAL

## 2022-02-17 VITALS
BODY MASS INDEX: 50.02 KG/M2 | HEART RATE: 102 BPM | DIASTOLIC BLOOD PRESSURE: 80 MMHG | HEIGHT: 64 IN | TEMPERATURE: 98.3 F | SYSTOLIC BLOOD PRESSURE: 130 MMHG | WEIGHT: 293 LBS | OXYGEN SATURATION: 95 %

## 2022-02-17 DIAGNOSIS — E11.9 TYPE 2 DIABETES MELLITUS WITHOUT COMPLICATION, WITHOUT LONG-TERM CURRENT USE OF INSULIN (HCC): Primary | ICD-10-CM

## 2022-02-17 DIAGNOSIS — I10 ESSENTIAL HYPERTENSION: ICD-10-CM

## 2022-02-17 DIAGNOSIS — E11.65 UNCONTROLLED TYPE 2 DIABETES MELLITUS WITH HYPERGLYCEMIA (HCC): ICD-10-CM

## 2022-02-17 DIAGNOSIS — Z79.4 TYPE 2 DIABETES MELLITUS WITHOUT COMPLICATION, WITH LONG-TERM CURRENT USE OF INSULIN (HCC): ICD-10-CM

## 2022-02-17 DIAGNOSIS — E11.9 TYPE 2 DIABETES MELLITUS WITHOUT COMPLICATION, WITH LONG-TERM CURRENT USE OF INSULIN (HCC): ICD-10-CM

## 2022-02-17 LAB — HBA1C MFR BLD: 8 %

## 2022-02-17 PROCEDURE — G8482 FLU IMMUNIZE ORDER/ADMIN: HCPCS | Performed by: FAMILY MEDICINE

## 2022-02-17 PROCEDURE — 3052F HG A1C>EQUAL 8.0%<EQUAL 9.0%: CPT | Performed by: FAMILY MEDICINE

## 2022-02-17 PROCEDURE — 3017F COLORECTAL CA SCREEN DOC REV: CPT | Performed by: FAMILY MEDICINE

## 2022-02-17 PROCEDURE — 2022F DILAT RTA XM EVC RTNOPTHY: CPT | Performed by: FAMILY MEDICINE

## 2022-02-17 PROCEDURE — 1036F TOBACCO NON-USER: CPT | Performed by: FAMILY MEDICINE

## 2022-02-17 PROCEDURE — G8427 DOCREV CUR MEDS BY ELIG CLIN: HCPCS | Performed by: FAMILY MEDICINE

## 2022-02-17 PROCEDURE — 99214 OFFICE O/P EST MOD 30 MIN: CPT | Performed by: FAMILY MEDICINE

## 2022-02-17 PROCEDURE — G8417 CALC BMI ABV UP PARAM F/U: HCPCS | Performed by: FAMILY MEDICINE

## 2022-02-17 PROCEDURE — 83036 HEMOGLOBIN GLYCOSYLATED A1C: CPT | Performed by: FAMILY MEDICINE

## 2022-02-17 RX ORDER — INSULIN DETEMIR 100 [IU]/ML
35 INJECTION, SOLUTION SUBCUTANEOUS 2 TIMES DAILY
Qty: 63 ML | Refills: 0 | Status: SHIPPED | OUTPATIENT
Start: 2022-02-17 | End: 2022-05-19 | Stop reason: SDUPTHER

## 2022-02-17 RX ORDER — FLASH GLUCOSE SCANNING READER
1 EACH MISCELLANEOUS 3 TIMES DAILY
Qty: 1 EACH | Refills: 1 | Status: SHIPPED | OUTPATIENT
Start: 2022-02-17

## 2022-02-17 RX ORDER — FLASH GLUCOSE SENSOR
1 KIT MISCELLANEOUS 3 TIMES DAILY
Qty: 2 EACH | Refills: 5 | Status: SHIPPED | OUTPATIENT
Start: 2022-02-17

## 2022-02-17 RX ORDER — PEN NEEDLE, DIABETIC 31 GX5/16"
1 NEEDLE, DISPOSABLE MISCELLANEOUS 2 TIMES DAILY
Qty: 200 EACH | Refills: 11 | Status: SHIPPED | OUTPATIENT
Start: 2022-02-17

## 2022-02-17 SDOH — ECONOMIC STABILITY: FOOD INSECURITY: WITHIN THE PAST 12 MONTHS, THE FOOD YOU BOUGHT JUST DIDN'T LAST AND YOU DIDN'T HAVE MONEY TO GET MORE.: NEVER TRUE

## 2022-02-17 SDOH — ECONOMIC STABILITY: FOOD INSECURITY: WITHIN THE PAST 12 MONTHS, YOU WORRIED THAT YOUR FOOD WOULD RUN OUT BEFORE YOU GOT MONEY TO BUY MORE.: NEVER TRUE

## 2022-02-17 ASSESSMENT — ENCOUNTER SYMPTOMS: COUGH: 1

## 2022-02-17 ASSESSMENT — SOCIAL DETERMINANTS OF HEALTH (SDOH): HOW HARD IS IT FOR YOU TO PAY FOR THE VERY BASICS LIKE FOOD, HOUSING, MEDICAL CARE, AND HEATING?: NOT HARD AT ALL

## 2022-02-17 NOTE — PROGRESS NOTES
[facility]  [address] [facility] [address]  Dept: [phone]    Annika Isaacs is a 55 y.o. female Established patient, who presents today for her medical conditions/complaintsas noted below. Chief Complaint   Patient presents with    Diabetes     Patient said she checks her sugars at home,pt said her suagrs been higher the past week 180-200    Other     patient c/o fever, nasal congestion, head congestio nand cough. Patient said said she did a home COVID test Monday and that came back negative. HPI:     HPI  Sugars higher x 2-3 weeks. 180-200. She is checking twice a day. She is not sure why her sugars are elevating. She does not change much with her diet or exercise level. She is considering gastric sleeve surgery but is nervous about it. Working out 3 x a week. Trying to watch her diet. Has been taking 62 units. She's afraid of low sugars so did not increase it to the 70. PT is helping her mobility     Reviewed prior notes None  Reviewed previous Labs    Fever over the weekend. No fever now. Sinus congestions: used saline nose, mucinex started yesterday. Started with ear pain and a lot phlegm on SUnday. Ear pain is better today. Coughing off and on. Did home covid test and was negative. Was exposed to a client last week that was + for covid, but he was on the other side of the plastic barrier. Works as an .     LDL Cholesterol (mg/dL)   Date Value   07/09/2019 118     LDL Calculated (mg/dL)   Date Value   01/08/2011 89       (goal LDL is <100)   AST (U/L)   Date Value   07/21/2019 36 (H)     ALT (U/L)   Date Value   07/21/2019 55 (H)     BUN (mg/dL)   Date Value   07/21/2019 12     Hemoglobin A1C (%)   Date Value   02/17/2022 8.0     TSH (mIU/L)   Date Value   07/09/2019 0.80     BP Readings from Last 3 Encounters:   02/17/22 130/80   12/16/21 130/80   12/02/21 136/84          (goal 120/80)    Past Medical History:   Diagnosis Date    Acute left-sided low back pain with left-sided sciatica     Arthritis     BMI 70 and over, adult ([facility]     Cancer ([facility] 2013    Diabetes mellitus ([facility]     High blood pressure     Hypertension     Sleep apnea       Past Surgical History:   Procedure Laterality Date     SECTION      HYSTERECTOMY, TOTAL ABDOMINAL      OTHER SURGICAL HISTORY      [id number] multiple procedures to remove cancer       Family History   Problem Relation Age of Onset    Cancer Mother         thyroid    Diabetes Mother     Anemia Other     Cancer Other     Breast Cancer Other     Diabetes Other     High Blood Pressure Other     Heart Disease Other     Hypertension Father        Social History     Tobacco Use    Smoking status: Never Smoker    Smokeless tobacco: Never Used   Substance Use Topics    Alcohol use: No      Current Outpatient Medications   Medication Sig Dispense Refill    insulin detemir (LEVEMIR FLEXTOUCH) 100 UNIT/ML injection pen Inject 35 Units into the skin 2 times daily If sugars not down increase to 40 units BID 63 mL 0    Insulin Pen Needle (B-D ULTRAFINE III SHORT PEN) 31G X 8 MM MISC Inject 1 each into the skin 2 times daily May substitute with any brand 200 each 11    Continuous Blood Gluc Sensor (FREESTYLE LUIS ANGEL 14 DAY SENSOR) MISC 1 Device by Does not apply route in the morning, at noon, and at bedtime Uncontrolled type 2 DM on insulin, shots BID 2 each 5    Continuous Blood Gluc  (FREESTYLE LUIS ANGEL 2 READER) MELQUIADES 1 Device by Does not apply route in the morning, at noon, and at bedtime Type 2 DM on insulin BID, uncontrolled DM 1 each 1    Handicap Placard MISC by Does not apply route For orthopedic disease. Expires 2026 1 each 0    Dulaglutide (TRULICITY) 1.5 VE/3.3MU SOPN Inject 1.5 MG into the skin once a week.  4 pen 4    metFORMIN (GLUCOPHAGE) 500 MG tablet Take 1 tablet by mouth 2 times daily (with meals) 180 tablet 3    triamterene-hydroCHLOROthiazide (MAXZIDE) 75-50 MG per tablet TAKE 1 TABLET BY MOUTH EVERY DAY 90 tablet 3    lisinopril (PRINIVIL;ZESTRIL) 10 MG tablet Take 1 tablet by mouth daily TAKE 1 TABLET BY MOUTH EVERY DAY 90 tablet 2    Lancets MISC 1 each by Does not apply route 3 times daily 100 each 5    blood glucose monitor strips Test 3  times a day & as needed for symptoms of irregular blood glucose. Dispense sufficient amount for indicated testing frequency plus additional to accommodate PRN testing needs. Type 2 DM on insulin 100 strip 3    Saline (SIMPLY SALINE) 0.9 % AERS 2 sprays by Nasal route 4 times daily  0     No current facility-administered medications for this visit. Allergies   Allergen Reactions    Latex Itching and Rash    Penicillins Anaphylaxis and Other (See Comments)     Passes out, all penicillins        Health Maintenance   Topic Date Due    Hepatitis C screen  Never done    HIV screen  Never done    Hepatitis B vaccine (1 of 3 - Risk 3-dose series) Never done    Colorectal Cancer Screen  Never done    Breast cancer screen  Never done    Shingles Vaccine (1 of 2) Never done    Lipid screen  07/09/2020    Potassium monitoring  07/21/2020    Creatinine monitoring  07/21/2020    Diabetic foot exam  01/22/2021    Diabetic microalbuminuria test  01/22/2021    Diabetic retinal exam  08/13/2022    Depression Screen  12/30/2022    A1C test (Diabetic or Prediabetic)  02/17/2023    DTaP/Tdap/Td vaccine (2 - Td or Tdap) 10/27/2027    Pneumococcal 0-64 years Vaccine (2 of 2 - PPSV23) 11/04/2031    Flu vaccine  Completed    COVID-19 Vaccine  Completed    Hepatitis A vaccine  Aged Out    Hib vaccine  Aged Out    Meningococcal (ACWY) vaccine  Aged Out       Subjective:      Review of Systems   Respiratory: Positive for cough. Cardiovascular: Negative.         Objective:     /80   Pulse 102   Temp 98.3 °F (36.8 °C)   Ht 5' 4\" (1.626 m)   Wt (!) 363 lb (164.7 kg) SpO2 95%   BMI 62.31 kg/m²   Physical Exam  Vitals and nursing note reviewed. Constitutional:       General: She is not in acute distress. Appearance: She is well-developed. She is not ill-appearing. HENT:      Head: Normocephalic and atraumatic. Right Ear: Tympanic membrane, ear canal and external ear normal.      Left Ear: Tympanic membrane, ear canal and external ear normal.   Eyes:      General: No scleral icterus. Right eye: No discharge. Left eye: No discharge. Conjunctiva/sclera: Conjunctivae normal.      Pupils: Pupils are equal, round, and reactive to light. Neck:      Thyroid: No thyromegaly. Trachea: No tracheal deviation. Cardiovascular:      Rate and Rhythm: Normal rate and regular rhythm. Heart sounds: Normal heart sounds. Pulmonary:      Effort: Pulmonary effort is normal. No respiratory distress. Breath sounds: Normal breath sounds. No wheezing. Lymphadenopathy:      Cervical: No cervical adenopathy. Skin:     General: Skin is warm. Findings: No rash. Neurological:      Mental Status: She is alert and oriented to person, place, and time. Psychiatric:         Mood and Affect: Mood normal.         Behavior: Behavior normal.         Thought Content: Thought content normal.         Assessment:       Diagnosis Orders   1. Type 2 diabetes mellitus without complication, without long-term current use of insulin ([facility])  POCT glycosylated hemoglobin (Hb A1C)    Continuous Blood Gluc Sensor (FREESTYLE LUIS ANGEL 14 DAY SENSOR) MISC    Continuous Blood Gluc  (FREESTYLE LUIS ANGEL 2 READER) MELQUIADES    Microalbumin, Ur   2. Essential hypertension     3.  Type 2 diabetes mellitus without complication, with long-term current use of insulin ([facility])  insulin detemir (LEVEMIR FLEXTOUCH) 100 UNIT/ML injection pen    Insulin Pen Needle (B-D ULTRAFINE III SHORT PEN) 31G X 8 MM MISC    Continuous Blood Gluc Sensor (FREESTYLE LUIS ANGEL 14 DAY SENSOR) MISC    Continuous Blood Gluc  (FREESTYLE LUIS ANGEL 2 READER) MELQUIADES    Microalbumin, Ur   4. Uncontrolled type 2 diabetes mellitus with hyperglycemia ([facility])  Microalbumin, Ur        Plan:      No follow-ups on file. Her A1c is 8.0. Try splitting the dose of insulin. Takes 35 units twice a day and if sugars not down increase it to 40 units twice a day. Discussed option of having short acting insulin with meals, if sugars not down. Check sugars 2-3 x a day. More often if she feels low sugar reactions. Discussed her concerns about gastric bypass surgery. Discussed pros and cons. Especially since she has diabetes and joint problems  Orders Placed This Encounter   Procedures    Microalbumin, Ur     Standing Status:   Future     Standing Expiration Date:   2023    POCT glycosylated hemoglobin (Hb A1C)     Orders Placed This Encounter   Medications    insulin detemir (LEVEMIR FLEXTOUCH) 100 UNIT/ML injection pen     Sig: Inject 35 Units into the skin 2 times daily If sugars not down increase to 40 units BID     Dispense:  63 mL     Refill:  0    Insulin Pen Needle (B-D ULTRAFINE III SHORT PEN) 31G X 8 MM MISC     Sig: Inject 1 each into the skin 2 times daily May substitute with any brand     Dispense:  200 each     Refill:  11    Continuous Blood Gluc Sensor (FREESTYLE LUIS ANGEL 14 DAY SENSOR) MISC     Si Device by Does not apply route in the morning, at noon, and at bedtime Uncontrolled type 2 DM on insulin, shots BID     Dispense:  2 each     Refill:  5    Continuous Blood Gluc  (FREESTYLE LUIS ANGEL 2 READER) MELQUIADES     Si Device by Does not apply route in the morning, at noon, and at bedtime Type 2 DM on insulin BID, uncontrolled DM     Dispense:  1 each     Refill:  1    Discussed use, benefit, and side effectsof prescribed medications. All patient questions answered. Pt voiced understanding. Reviewed health maintenance. Instructed to continue current medications, diet andexercise.   Patient agreed with treatment plan. Follow up as directed.      Electronicallysigned by Oseas Morrell MD on 2/17/2022 at 8:55 AM

## 2022-02-18 ENCOUNTER — TELEPHONE (OUTPATIENT)
Dept: PRIMARY CARE CLINIC | Age: 57
End: 2022-02-18

## 2022-02-21 ENCOUNTER — HOSPITAL ENCOUNTER (OUTPATIENT)
Age: 57
Discharge: HOME | End: 2022-02-21
Payer: COMMERCIAL

## 2022-02-21 DIAGNOSIS — E11.9 TYPE 2 DIABETES MELLITUS WITHOUT COMPLICATION, WITH LONG-TERM CURRENT USE OF INSULIN (HCC): ICD-10-CM

## 2022-02-21 DIAGNOSIS — I10 ESSENTIAL HYPERTENSION: ICD-10-CM

## 2022-02-21 DIAGNOSIS — E66.01 MORBID OBESITY WITH BMI OF 60.0-69.9, ADULT (HCC): ICD-10-CM

## 2022-02-21 DIAGNOSIS — Z85.42 HISTORY OF ENDOMETRIAL CANCER: ICD-10-CM

## 2022-02-21 DIAGNOSIS — E28.2 PCOS (POLYCYSTIC OVARIAN SYNDROME): ICD-10-CM

## 2022-02-21 DIAGNOSIS — R74.8 ELEVATED LIVER ENZYMES: ICD-10-CM

## 2022-02-21 DIAGNOSIS — G47.33 OSA (OBSTRUCTIVE SLEEP APNEA): Chronic | ICD-10-CM

## 2022-02-21 DIAGNOSIS — E11.9 TYPE 2 DIABETES MELLITUS WITHOUT COMPLICATION, WITHOUT LONG-TERM CURRENT USE OF INSULIN (HCC): ICD-10-CM

## 2022-02-21 DIAGNOSIS — E11.65 UNCONTROLLED TYPE 2 DIABETES MELLITUS WITH HYPERGLYCEMIA (HCC): ICD-10-CM

## 2022-02-21 DIAGNOSIS — Z79.4 TYPE 2 DIABETES MELLITUS WITHOUT COMPLICATION, WITH LONG-TERM CURRENT USE OF INSULIN (HCC): ICD-10-CM

## 2022-02-21 LAB
ABSOLUTE EOS #: 0.14 K/UL (ref 0–0.44)
ABSOLUTE IMMATURE GRANULOCYTE: 0.05 K/UL (ref 0–0.3)
ABSOLUTE LYMPH #: 1.34 K/UL (ref 1.1–3.7)
ABSOLUTE MONO #: 0.49 K/UL (ref 0.1–1.2)
ALBUMIN SERPL-MCNC: 3.7 G/DL (ref 3.5–5.2)
ALBUMIN SERPL-MCNC: 3.7 G/DL (ref 3.5–5.2)
ALBUMIN/GLOBULIN RATIO: 1.3 (ref 1–2.5)
ALBUMIN/GLOBULIN RATIO: 1.3 (ref 1–2.5)
ALP BLD-CCNC: 88 U/L (ref 35–104)
ALP BLD-CCNC: 88 U/L (ref 35–104)
ALT SERPL-CCNC: 52 U/L (ref 5–33)
ALT SERPL-CCNC: 52 U/L (ref 5–33)
ANION GAP SERPL CALCULATED.3IONS-SCNC: 18 MMOL/L (ref 9–17)
ANION GAP SERPL CALCULATED.3IONS-SCNC: 18 MMOL/L (ref 9–17)
AST SERPL-CCNC: 29 U/L
AST SERPL-CCNC: 29 U/L
BASOPHILS # BLD: 0 % (ref 0–2)
BASOPHILS ABSOLUTE: 0.03 K/UL (ref 0–0.2)
BILIRUB SERPL-MCNC: <0.1 MG/DL (ref 0.3–1.2)
BILIRUB SERPL-MCNC: <0.1 MG/DL (ref 0.3–1.2)
BUN BLDV-MCNC: 12 MG/DL (ref 6–20)
BUN BLDV-MCNC: 12 MG/DL (ref 6–20)
CALCIUM SERPL-MCNC: 9.1 MG/DL (ref 8.6–10.4)
CALCIUM SERPL-MCNC: 9.1 MG/DL (ref 8.6–10.4)
CHLORIDE BLD-SCNC: 97 MMOL/L (ref 98–107)
CHLORIDE BLD-SCNC: 97 MMOL/L (ref 98–107)
CHOLESTEROL/HDL RATIO: 4.2
CHOLESTEROL/HDL RATIO: 4.2
CHOLESTEROL: 211 MG/DL
CHOLESTEROL: 211 MG/DL
CO2: 20 MMOL/L (ref 20–31)
CO2: 20 MMOL/L (ref 20–31)
CREAT SERPL-MCNC: 0.55 MG/DL (ref 0.5–0.9)
CREAT SERPL-MCNC: 0.55 MG/DL (ref 0.5–0.9)
EOSINOPHILS RELATIVE PERCENT: 2 % (ref 1–4)
FERRITIN: 206 UG/L (ref 13–150)
FOLATE: 14 NG/ML
GFR AFRICAN AMERICAN: >60 ML/MIN
GFR AFRICAN AMERICAN: >60 ML/MIN
GFR NON-AFRICAN AMERICAN: >60 ML/MIN
GFR NON-AFRICAN AMERICAN: >60 ML/MIN
GFR SERPL CREATININE-BSD FRML MDRD: ABNORMAL ML/MIN/{1.73_M2}
GFR SERPL CREATININE-BSD FRML MDRD: ABNORMAL ML/MIN/{1.73_M2}
GLUCOSE BLD-MCNC: 144 MG/DL (ref 70–99)
GLUCOSE FASTING: 144 MG/DL (ref 70–99)
HCT VFR BLD CALC: 36.6 % (ref 36.3–47.1)
HDLC SERPL-MCNC: 50 MG/DL
HDLC SERPL-MCNC: 50 MG/DL
HEMOGLOBIN: 12 G/DL (ref 11.9–15.1)
IMMATURE GRANULOCYTES: 1 %
IRON SATURATION: 22 % (ref 20–55)
IRON: 72 UG/DL (ref 37–145)
LDL CHOLESTEROL: 119 MG/DL (ref 0–130)
LDL CHOLESTEROL: 119 MG/DL (ref 0–130)
LYMPHOCYTES # BLD: 17 % (ref 24–43)
MAGNESIUM: 1.6 MG/DL (ref 1.6–2.6)
MCH RBC QN AUTO: 31.4 PG (ref 25.2–33.5)
MCHC RBC AUTO-ENTMCNC: 32.8 G/DL (ref 28.4–34.8)
MCV RBC AUTO: 95.8 FL (ref 82.6–102.9)
MONOCYTES # BLD: 6 % (ref 3–12)
NRBC AUTOMATED: 0 PER 100 WBC
PDW BLD-RTO: 13.1 % (ref 11.8–14.4)
PLATELET # BLD: 271 K/UL (ref 138–453)
PMV BLD AUTO: 10.6 FL (ref 8.1–13.5)
POTASSIUM SERPL-SCNC: 4.4 MMOL/L (ref 3.7–5.3)
POTASSIUM SERPL-SCNC: 4.4 MMOL/L (ref 3.7–5.3)
PTH INTACT: 51.22 PG/ML (ref 15–65)
RBC # BLD: 3.82 M/UL (ref 3.95–5.11)
SEG NEUTROPHILS: 74 % (ref 36–65)
SEGMENTED NEUTROPHILS ABSOLUTE COUNT: 5.66 K/UL (ref 1.5–8.1)
SODIUM BLD-SCNC: 135 MMOL/L (ref 135–144)
SODIUM BLD-SCNC: 135 MMOL/L (ref 135–144)
THYROXINE, FREE: 1.22 NG/DL (ref 0.93–1.7)
TOTAL IRON BINDING CAPACITY: 325 UG/DL (ref 250–450)
TOTAL PROTEIN: 6.6 G/DL (ref 6.4–8.3)
TOTAL PROTEIN: 6.6 G/DL (ref 6.4–8.3)
TRIGL SERPL-MCNC: 212 MG/DL
TRIGL SERPL-MCNC: 212 MG/DL
TSH SERPL DL<=0.05 MIU/L-ACNC: 0.54 MIU/L (ref 0.3–5)
UNSATURATED IRON BINDING CAPACITY: 253 UG/DL (ref 112–347)
VITAMIN B-12: 427 PG/ML (ref 232–1245)
VITAMIN D 25-HYDROXY: 12.3 NG/ML (ref 30–100)
WBC # BLD: 7.7 K/UL (ref 3.5–11.3)

## 2022-02-21 PROCEDURE — 84590 ASSAY OF VITAMIN A: CPT

## 2022-02-21 PROCEDURE — 83550 IRON BINDING TEST: CPT

## 2022-02-21 PROCEDURE — 36415 COLL VENOUS BLD VENIPUNCTURE: CPT

## 2022-02-21 PROCEDURE — 82728 ASSAY OF FERRITIN: CPT

## 2022-02-21 PROCEDURE — 84439 ASSAY OF FREE THYROXINE: CPT

## 2022-02-21 PROCEDURE — 84425 ASSAY OF VITAMIN B-1: CPT

## 2022-02-21 PROCEDURE — 82570 ASSAY OF URINE CREATININE: CPT

## 2022-02-21 PROCEDURE — 80061 LIPID PANEL: CPT

## 2022-02-21 PROCEDURE — 84443 ASSAY THYROID STIM HORMONE: CPT

## 2022-02-21 PROCEDURE — 82746 ASSAY OF FOLIC ACID SERUM: CPT

## 2022-02-21 PROCEDURE — 83970 ASSAY OF PARATHORMONE: CPT

## 2022-02-21 PROCEDURE — 83540 ASSAY OF IRON: CPT

## 2022-02-21 PROCEDURE — 83735 ASSAY OF MAGNESIUM: CPT

## 2022-02-21 PROCEDURE — 84630 ASSAY OF ZINC: CPT

## 2022-02-21 PROCEDURE — 82306 VITAMIN D 25 HYDROXY: CPT

## 2022-02-21 PROCEDURE — 80053 COMPREHEN METABOLIC PANEL: CPT

## 2022-02-21 PROCEDURE — 82607 VITAMIN B-12: CPT

## 2022-02-21 PROCEDURE — 82043 UR ALBUMIN QUANTITATIVE: CPT

## 2022-02-21 PROCEDURE — 85025 COMPLETE CBC W/AUTO DIFF WBC: CPT

## 2022-02-22 ENCOUNTER — OFFICE VISIT (OUTPATIENT)
Dept: BARIATRICS/WEIGHT MGMT | Age: 57
End: 2022-02-22
Payer: COMMERCIAL

## 2022-02-22 VITALS
WEIGHT: 293 LBS | SYSTOLIC BLOOD PRESSURE: 128 MMHG | BODY MASS INDEX: 50.02 KG/M2 | HEART RATE: 109 BPM | HEIGHT: 64 IN | DIASTOLIC BLOOD PRESSURE: 68 MMHG

## 2022-02-22 DIAGNOSIS — E66.01 MORBID OBESITY WITH BMI OF 60.0-69.9, ADULT (HCC): ICD-10-CM

## 2022-02-22 DIAGNOSIS — I10 ESSENTIAL HYPERTENSION: ICD-10-CM

## 2022-02-22 DIAGNOSIS — G47.33 OSA (OBSTRUCTIVE SLEEP APNEA): Primary | Chronic | ICD-10-CM

## 2022-02-22 DIAGNOSIS — E28.2 PCOS (POLYCYSTIC OVARIAN SYNDROME): ICD-10-CM

## 2022-02-22 DIAGNOSIS — R74.8 ELEVATED LIVER ENZYMES: ICD-10-CM

## 2022-02-22 DIAGNOSIS — E55.9 VITAMIN D DEFICIENCY: ICD-10-CM

## 2022-02-22 DIAGNOSIS — Z79.4 TYPE 2 DIABETES MELLITUS WITHOUT COMPLICATION, WITH LONG-TERM CURRENT USE OF INSULIN (HCC): ICD-10-CM

## 2022-02-22 DIAGNOSIS — E11.9 TYPE 2 DIABETES MELLITUS WITHOUT COMPLICATION, WITH LONG-TERM CURRENT USE OF INSULIN (HCC): ICD-10-CM

## 2022-02-22 PROCEDURE — 3017F COLORECTAL CA SCREEN DOC REV: CPT | Performed by: NURSE PRACTITIONER

## 2022-02-22 PROCEDURE — G8417 CALC BMI ABV UP PARAM F/U: HCPCS | Performed by: NURSE PRACTITIONER

## 2022-02-22 PROCEDURE — 3052F HG A1C>EQUAL 8.0%<EQUAL 9.0%: CPT | Performed by: NURSE PRACTITIONER

## 2022-02-22 PROCEDURE — 1036F TOBACCO NON-USER: CPT | Performed by: NURSE PRACTITIONER

## 2022-02-22 PROCEDURE — G8427 DOCREV CUR MEDS BY ELIG CLIN: HCPCS | Performed by: NURSE PRACTITIONER

## 2022-02-22 PROCEDURE — G8482 FLU IMMUNIZE ORDER/ADMIN: HCPCS | Performed by: NURSE PRACTITIONER

## 2022-02-22 PROCEDURE — 99213 OFFICE O/P EST LOW 20 MIN: CPT | Performed by: NURSE PRACTITIONER

## 2022-02-22 PROCEDURE — 2022F DILAT RTA XM EVC RTNOPTHY: CPT | Performed by: NURSE PRACTITIONER

## 2022-02-22 RX ORDER — ERGOCALCIFEROL 1.25 MG/1
50000 CAPSULE ORAL WEEKLY
Qty: 12 CAPSULE | Refills: 0 | Status: SHIPPED | OUTPATIENT
Start: 2022-02-22 | End: 2022-07-25 | Stop reason: ALTCHOICE

## 2022-02-22 NOTE — PROGRESS NOTES
Medical Weight Management Progress Note    Subjective     Patient being seen for medically supervised weight loss for the chronic conditions of Type 2 DM, HTN, PCOS, KIANA, Elevated Liver Enzymes, Hx Endometrial Cancer. She is working on the behavior changes discussed at the initial appointment. Patient continues on diet plan. Physical activity includes walking. Weight loss of 1 lb since consult visit. Uses CPAP. Psych eval scheduled 3/18/22. Needs to schedule EGD. Diabetes regimen includes Trulicity, metformin, and Levemir insulin. Last A1C 8.1%. No current issues.     Working toward bariatric surgery:    [x]? Sleeve Gastrectomy                                                           []? Najma-en-Y Gastric Bypass  Allergies: Allergies   Allergen Reactions    Latex Itching and Rash    Penicillins Anaphylaxis and Other (See Comments)     Passes out, all penicillins        Past Medical History:     Past Medical History:   Diagnosis Date    Acute left-sided low back pain with left-sided sciatica     Arthritis     BMI 70 and over, adult ([address]     Cancer [facility]     Diabetes mellitus ([facility]     High blood pressure     Hypertension     Sleep apnea    .     Past Surgical History:  Past Surgical History:   Procedure Laterality Date     SECTION      HYSTERECTOMY, TOTAL ABDOMINAL      OTHER SURGICAL HISTORY      [id number] multiple procedures to remove cancer       Family History:  Family History   Problem Relation Age of Onset    Cancer Mother         thyroid    Diabetes Mother     Anemia Other     Cancer Other     Breast Cancer Other     Diabetes Other     High Blood Pressure Other     Heart Disease Other     Hypertension Father        Social History:  Social History     Socioeconomic History    Marital status:      Spouse name: Not on file    Number of children: Not on file    Years of education: Not on file    Highest education level: Not on file   Occupational History  Not on file   Tobacco Use    Smoking status: Never Smoker    Smokeless tobacco: Never Used   Vaping Use    Vaping Use: Never used   Substance and Sexual Activity    Alcohol use: No    Drug use: No    Sexual activity: Not on file   Other Topics Concern    Not on file   Social History Narrative    Not on file     Social Determinants of Health     Financial Resource Strain: Low Risk     Difficulty of Paying Living Expenses: Not hard at all   Food Insecurity: No Food Insecurity    Worried About [address] in the Last Year: Never true    [facility] in the Last Year: Never true   Transportation Needs:     Lack of Transportation (Medical): Not on file    Lack of Transportation (Non-Medical):  Not on file   Physical Activity:     Days of Exercise per Week: Not on file    Minutes of Exercise per Session: Not on file   Stress:     Feeling of Stress : Not on file   Social Connections:     Frequency of Communication with Friends and Family: Not on file    Frequency of Social Gatherings with Friends and Family: Not on file    Attends [employer] Services: Not on file    Active Member of [address] [place] [address] or Organizations: Not on file    Attends Club or Organization Meetings: Not on file    Marital Status: Not on file   Intimate Partner Violence:     Fear of Current or Ex-Partner: Not on file    Emotionally Abused: Not on file    Physically Abused: Not on file    Sexually Abused: Not on file   Housing Stability:     Unable to Pay for Housing in the Last Year: Not on file    Number of [place] in the Last Year: Not on file    Unstable Housing in the Last Year: Not on file       Current Medications:  Current Outpatient Medications   Medication Sig Dispense Refill    vitamin D (ERGOCALCIFEROL) 1.25 MG (61145 UT) CAPS capsule Take 1 capsule by mouth once a week for 12 doses 12 capsule 0    insulin detemir (LEVEMIR FLEXTOUCH) 100 UNIT/ML injection pen Inject 35 Units into the skin 2 times daily If sugars not down increase to 40 units BID 63 mL 0    Insulin Pen Needle (B-D ULTRAFINE III SHORT PEN) 31G X 8 MM MISC Inject 1 each into the skin 2 times daily May substitute with any brand 200 each 11    Continuous Blood Gluc Sensor (FREESTYLE LUIS ANGEL 14 DAY SENSOR) MISC 1 Device by Does not apply route in the morning, at noon, and at bedtime Uncontrolled type 2 DM on insulin, shots BID 2 each 5    Continuous Blood Gluc  (FREESTYLE LUIS ANGEL 2 READER) MELQUIADES 1 Device by Does not apply route in the morning, at noon, and at bedtime Type 2 DM on insulin BID, uncontrolled DM 1 each 1    Handicap Placard MISC by Does not apply route For orthopedic disease. Expires 12/6/2026 1 each 0    Dulaglutide (TRULICITY) 1.5 AC/2.0DI SOPN Inject 1.5 MG into the skin once a week. 4 pen 4    metFORMIN (GLUCOPHAGE) 500 MG tablet Take 1 tablet by mouth 2 times daily (with meals) 180 tablet 3    triamterene-hydroCHLOROthiazide (MAXZIDE) 75-50 MG per tablet TAKE 1 TABLET BY MOUTH EVERY DAY 90 tablet 3    lisinopril (PRINIVIL;ZESTRIL) 10 MG tablet Take 1 tablet by mouth daily TAKE 1 TABLET BY MOUTH EVERY DAY 90 tablet 2    Lancets MISC 1 each by Does not apply route 3 times daily 100 each 5    blood glucose monitor strips Test 3  times a day & as needed for symptoms of irregular blood glucose. Dispense sufficient amount for indicated testing frequency plus additional to accommodate PRN testing needs. Type 2 DM on insulin 100 strip 3    Saline (SIMPLY SALINE) 0.9 % AERS 2 sprays by Nasal route 4 times daily  0     No current facility-administered medications for this visit. Vital Signs:  /68 (Site: Right Lower Arm, Position: Sitting, Cuff Size: Large Adult)   Pulse 109   Ht 5' 4\" (1.626 m)   Wt (!) 365 lb (165.6 kg)   BMI 62.65 kg/m²     BMI/Height/Weight:  Body mass index is 62.65 kg/m². Review of Systems - A review of systems was performed.   All was negative unless otherwise documented in HPI.    Constitutional: Negative for fever, chills and diaphoresis. HENT: Negative for hearing loss and trouble swallowing. Eyes: Negative for photophobia and visual disturbance. Respiratory: Negative for cough, shortness of breath and wheezing. Cardiovascular: Negative for chest pain and palpitations. Gastrointestinal: Negative for nausea, vomiting, abdominal pain, diarrhea, constipation, blood in stool and abdominal distention. Endocrine: Negative for polydipsia, polyphagia and polyuria. Genitourinary: Negative for dysuria, frequency, hematuria and difficulty urinating. Musculoskeletal: Negative for myalgias, joint swelling. Skin: Negative for pallor and rash. Neurological: Negative for dizziness, tremors, light-headedness and headaches. Psychiatric/Behavioral: Negative for sleep disturbance and dysphoric mood. Objective:      Physical Exam   Vital signs reviewed. General: Well-developed and well-nourished. No acute distress. Skin: Warm, dry and intact. HEENT: Normocephalic. EOMs intact. Conjunctivae normal. Neck supple. Cardiovascular: Normal rate, regular rhythm. Pulmonary/Chest: Normal effort. Lungs clear to auscultation. No rales, rhonchi or wheezing. Abdominal: Positive bowel sounds. Soft, nontender. Nondistended. Musculoskeletal: Movement x4. No edema. Neurological: Gait normal. Alert and oriented to person, place, and time. Psychiatric: Normal mood and affect. Speech and behavior normal. Judgment and thought content normal. Cognition and memory intact. Assessment:       Diagnosis Orders   1. KIANA (obstructive sleep apnea)     2. Morbid obesity with BMI of 60.0-69.9, adult ([facility]     3. Type 2 diabetes mellitus without complication, with long-term current use of insulin ([facility]     4. Essential hypertension     5. PCOS (polycystic ovarian syndrome)     6. Elevated liver enzymes     7.  Vitamin D deficiency  vitamin D (ERGOCALCIFEROL) 1.25 MG (62584 UT) CAPS capsule Plan:    Dietitian visit today. Patient was encouraged to journal all food intake. Keep calorie level at approximately 3626-7305. Protein intake is to be a minimum of 60-80 grams per day. Water drinking was encouraged with a goal of 64oz-128oz daily. Beverages to be calorie free except for milk. Every other beverage should be water. Avoid soda. Continue to increase level of physical activity. Encouraged use of exercise log. Labs reviewed and discussed with patient. Vitamin D level low and e-prescribed. Follow-up  Return in about 1 month (around 3/22/2022). Orders this encounter:  No orders of the defined types were placed in this encounter.       Prescriptions this encounter:  Orders Placed This Encounter   Medications    vitamin D (ERGOCALCIFEROL) 1.25 MG (16323 UT) CAPS capsule     Sig: Take 1 capsule by mouth once a week for 12 doses     Dispense:  12 capsule     Refill:  0       Electronically signed by:  Frandy Armenta CNP

## 2022-02-22 NOTE — PROGRESS NOTES
Medical Nutrition Therapy   Metabolic and Bariatric Surgery         Supervised diet and exercise preparation  Visit 2 out of 6  Pt reports:      Changes in eating patterns to promote health are noted below on the goals number 19-22    Vitals: Wt Readings from Last 3 Encounters:   02/22/22 (!) 365 lb (165.6 kg)   02/17/22 (!) 363 lb (164.7 kg)   12/16/21 (!) 366 lb (166 kg)         Nutrition Assessment:   PES: Knowledge deficit related to healthy behaviors that support weight management post weight loss surgery as evidenced by Body mass index is 62.65 kg/m². Nutrition Assessment of Goal Attainment:  TREATMENT GOALS:    1. Pt  Completed 1 out of 3 goals. 2.TREATMENT GOALS FOR UPCOMING WEEK: continue all previous goals and add: # 12    All goals were planned with and agreed on by the patient. I want to improve my health because I want to live. appt # NA G What is your next step? C 1 2 3 4 5 6 7 8 9     0  1 I will read the education binder provided to me and the   x 100             0  2 I will make my pschological evaluation appoinment. x 100             2  3 I will bring this goal card to every appointment. 100 0             x 4 I will eliminate all tobacco/nicotine. x 5 I will limit alcoholic beverages to 1-1LA per week. 6 I will limit dining out to 3 times per week or less. x 7 I will eliminate sugary beverages. x 8 I will eliminate carbonated beverages. 9 I will eliminate drinking with a straw. 10 I will limit caffeinated beverages to 16oz daily. 2  11 I will limit log my exercise daily. 100 0            2  12 I will determine my calcium and mvi plan. 13 I will have 1-2 servings of lean protein present at each meal and minimeal.                 14 I will eat every 3-5 hours. 15 I will drink 64oz of fluid daily.                  16 I will eat slowly during meals and snacks. 17 I will limit fluids 4oz before after and during meals. 18 I will eat protein first at all meals followed by vegetables,  Fruit and lastly whole grains. 2  19 My first weight neutral approach is:  I will limit eating after 7pm to a minimeal only. 100              20 My second weight neutral approach is:                 21  My Thirds weight neutral approach is:                 22                  23                  24                  25                    Questions asked for goal understanding:                                                  Do you understand your goals? Do you have the information you need to achieve your goals? Do you have any questions  right now? [x]  Consistent goal achievement in the program thus far and further success with goals is expected. []  Unable to consistently make progress in goal achievement. At this time patient is not moving forward  in developing the skills needed for success after surgery. Plan:    Continue to follow monthly and review goals.          [x]  Nutrition visits complete    []

## 2022-02-24 LAB
RETINYL PALMITATE: 0.03 MG/L (ref 0–0.1)
VITAMIN A LEVEL: 0.99 MG/L (ref 0.3–1.2)
VITAMIN A, INTERP: NORMAL
ZINC: 81.2 UG/DL (ref 60–120)

## 2022-02-27 LAB — VITAMIN B1 WHOLE BLOOD: 99 NMOL/L (ref 70–180)

## 2022-02-28 ENCOUNTER — OFFICE VISIT (OUTPATIENT)
Dept: PULMONOLOGY | Age: 57
End: 2022-02-28
Payer: COMMERCIAL

## 2022-02-28 VITALS
HEIGHT: 64 IN | DIASTOLIC BLOOD PRESSURE: 86 MMHG | WEIGHT: 293 LBS | SYSTOLIC BLOOD PRESSURE: 133 MMHG | OXYGEN SATURATION: 94 % | HEART RATE: 117 BPM | RESPIRATION RATE: 16 BRPM | BODY MASS INDEX: 50.02 KG/M2

## 2022-02-28 DIAGNOSIS — E66.01 CLASS 3 SEVERE OBESITY DUE TO EXCESS CALORIES WITH SERIOUS COMORBIDITY AND BODY MASS INDEX (BMI) OF 60.0 TO 69.9 IN ADULT (HCC): ICD-10-CM

## 2022-02-28 DIAGNOSIS — G47.33 OSA TREATED WITH BIPAP: Primary | ICD-10-CM

## 2022-02-28 PROCEDURE — G8482 FLU IMMUNIZE ORDER/ADMIN: HCPCS | Performed by: INTERNAL MEDICINE

## 2022-02-28 PROCEDURE — 1036F TOBACCO NON-USER: CPT | Performed by: INTERNAL MEDICINE

## 2022-02-28 PROCEDURE — 99213 OFFICE O/P EST LOW 20 MIN: CPT | Performed by: INTERNAL MEDICINE

## 2022-02-28 PROCEDURE — G8427 DOCREV CUR MEDS BY ELIG CLIN: HCPCS | Performed by: INTERNAL MEDICINE

## 2022-02-28 PROCEDURE — 3017F COLORECTAL CA SCREEN DOC REV: CPT | Performed by: INTERNAL MEDICINE

## 2022-02-28 PROCEDURE — G8417 CALC BMI ABV UP PARAM F/U: HCPCS | Performed by: INTERNAL MEDICINE

## 2022-02-28 ASSESSMENT — SLEEP AND FATIGUE QUESTIONNAIRES
HOW LIKELY ARE YOU TO NOD OFF OR FALL ASLEEP WHILE WATCHING TV: 0
HOW LIKELY ARE YOU TO NOD OFF OR FALL ASLEEP IN A CAR, WHILE STOPPED FOR A FEW MINUTES IN TRAFFIC: 0
HOW LIKELY ARE YOU TO NOD OFF OR FALL ASLEEP WHEN YOU ARE A PASSENGER IN A CAR FOR AN HOUR WITHOUT A BREAK: 0
HOW LIKELY ARE YOU TO NOD OFF OR FALL ASLEEP WHILE SITTING INACTIVE IN A PUBLIC PLACE: 0
HOW LIKELY ARE YOU TO NOD OFF OR FALL ASLEEP WHILE SITTING AND READING: 0
HOW LIKELY ARE YOU TO NOD OFF OR FALL ASLEEP WHILE LYING DOWN TO REST IN THE AFTERNOON WHEN CIRCUMSTANCES PERMIT: 0
HOW LIKELY ARE YOU TO NOD OFF OR FALL ASLEEP WHILE SITTING QUIETLY AFTER LUNCH WITHOUT ALCOHOL: 0
HOW LIKELY ARE YOU TO NOD OFF OR FALL ASLEEP WHILE SITTING AND TALKING TO SOMEONE: 0
ESS TOTAL SCORE: 0

## 2022-02-28 ASSESSMENT — ENCOUNTER SYMPTOMS
APNEA: 1
GASTROINTESTINAL NEGATIVE: 1
EYES NEGATIVE: 1

## 2022-02-28 NOTE — PROGRESS NOTES
(FREESTYLE LUIS ANGEL 2 READER) MELQUIADES 1 Device by Does not apply route in the morning, at noon, and at bedtime Type 2 DM on insulin BID, uncontrolled DM 1 each 1    Handicap Placard MISC by Does not apply route For orthopedic disease. Expires 12/6/2026 1 each 0    Dulaglutide (TRULICITY) 1.5 LJ/3.9YE SOPN Inject 1.5 MG into the skin once a week. 4 pen 4    metFORMIN (GLUCOPHAGE) 500 MG tablet Take 1 tablet by mouth 2 times daily (with meals) 180 tablet 3    triamterene-hydroCHLOROthiazide (MAXZIDE) 75-50 MG per tablet TAKE 1 TABLET BY MOUTH EVERY DAY 90 tablet 3    lisinopril (PRINIVIL;ZESTRIL) 10 MG tablet Take 1 tablet by mouth daily TAKE 1 TABLET BY MOUTH EVERY DAY 90 tablet 2    Lancets MISC 1 each by Does not apply route 3 times daily 100 each 5    blood glucose monitor strips Test 3  times a day & as needed for symptoms of irregular blood glucose. Dispense sufficient amount for indicated testing frequency plus additional to accommodate PRN testing needs. Type 2 DM on insulin 100 strip 3    Saline (SIMPLY SALINE) 0.9 % AERS 2 sprays by Nasal route 4 times daily  0     No current facility-administered medications for this visit. Physical Exam  Vitals and nursing note reviewed. Constitutional:       Appearance: She is well-developed. She is obese. HENT:      Head: Normocephalic. Mouth/Throat:      Comments: Large tongue , low hanging soft palate and large uvula. Overall pharyngeal orifice markedly decreased    Eyes:      General: No scleral icterus. Conjunctiva/sclera: Conjunctivae normal.   Neck:      Thyroid: No thyromegaly. Vascular: No JVD. Trachea: No tracheal deviation. Comments: Neck Short and thick. Cardiovascular:      Rate and Rhythm: Normal rate and regular rhythm. Heart sounds: Normal heart sounds. No murmur heard. No gallop. Pulmonary:      Effort: Pulmonary effort is normal. No respiratory distress. Breath sounds: No wheezing or rales. Chest:      Chest wall: No tenderness. Abdominal:      Palpations: Abdomen is soft. Tenderness: There is no abdominal tenderness. Musculoskeletal:      Cervical back: Neck supple. Right lower leg: No edema. Left lower leg: No edema. Lymphadenopathy:      Cervical: No cervical adenopathy. Skin:     General: Skin is warm and dry. Neurological:      Mental Status: She is alert and oriented to person, place, and time.        Wt Readings from Last 3 Encounters:   02/28/22 (!) 365 lb (165.6 kg)   02/22/22 (!) 365 lb (165.6 kg)   02/17/22 (!) 363 lb (164.7 kg)     Results for orders placed or performed during the hospital encounter of 02/21/22   Comprehensive Metabolic Panel   Result Value Ref Range    Glucose 144 (H) 70 - 99 mg/dL    BUN 12 6 - 20 mg/dL    CREATININE 0.55 0.50 - 0.90 mg/dL    Calcium 9.1 8.6 - 10.4 mg/dL    Sodium 135 135 - 144 mmol/L    Potassium 4.4 3.7 - 5.3 mmol/L    Chloride 97 (L) 98 - 107 mmol/L    CO2 20 20 - 31 mmol/L    Anion Gap 18 (H) 9 - 17 mmol/L    Alkaline Phosphatase 88 35 - 104 U/L    ALT 52 (H) 5 - 33 U/L    AST 29 <32 U/L    Total Bilirubin <0.10 (L) 0.3 - 1.2 mg/dL    Total Protein 6.6 6.4 - 8.3 g/dL    Albumin 3.7 3.5 - 5.2 g/dL    Albumin/Globulin Ratio 1.3 1.0 - 2.5    GFR Non-African American >60 >60 mL/min    GFR African American >60 >60 mL/min    GFR Comment         TSH without Reflex   Result Value Ref Range    TSH 0.54 0.30 - 5.00 mIU/L   T4, Free   Result Value Ref Range    Thyroxine, Free 1.22 0.93 - 1.70 ng/dL   Vitamin B12 & Folate   Result Value Ref Range    Vitamin B-12 427 232 - 1245 pg/mL    Folate 14.0 >4.8 ng/mL   Vitamin B1   Result Value Ref Range    Vitamin B1,Whole Blood 99 70 - 180 nmol/L   Vitamin D 25 Hydroxy   Result Value Ref Range    Vit D, 25-Hydroxy 12.3 (L) 30.0 - 100.0 ng/mL   Magnesium   Result Value Ref Range    Magnesium 1.6 1.6 - 2.6 mg/dL   Iron and TIBC   Result Value Ref Range    Iron 72 37 - 145 ug/dL    TIBC 325 250 - 450 ug/dL    Iron Saturation 22 20 - 55 %    UIBC 253 112 - 347 ug/dL   Vitamin A   Result Value Ref Range    Vitamin A 0.99 0.30 - 1.20 mg/L    RETINYL PALMITATE 0.03 0.00 - 0.10 mg/L    Vitamin A, Interp Normal    PTH, Intact   Result Value Ref Range    Pth Intact 51.22 15.0 - 65.0 pg/mL   CBC Auto Differential   Result Value Ref Range    WBC 7.7 3.5 - 11.3 k/uL    RBC 3.82 (L) 3.95 - 5.11 m/uL    Hemoglobin 12.0 11.9 - 15.1 g/dL    Hematocrit 36.6 36.3 - 47.1 %    MCV 95.8 82.6 - 102.9 fL    MCH 31.4 25.2 - 33.5 pg    MCHC 32.8 28.4 - 34.8 g/dL    RDW 13.1 11.8 - 14.4 %    Platelets 491 881 - 214 k/uL    MPV 10.6 8.1 - 13.5 fL    NRBC Automated 0.0 0.0 per 100 WBC    Seg Neutrophils 74 (H) 36 - 65 %    Lymphocytes 17 (L) 24 - 43 %    Monocytes 6 3 - 12 %    Eosinophils % 2 1 - 4 %    Basophils 0 0 - 2 %    Immature Granulocytes 1 (H) 0 %    Segs Absolute 5.66 1.50 - 8.10 k/uL    Absolute Lymph # 1.34 1.10 - 3.70 k/uL    Absolute Mono # 0.49 0.10 - 1.20 k/uL    Absolute Eos # 0.14 0.00 - 0.44 k/uL    Basophils Absolute 0.03 0.00 - 0.20 k/uL    Absolute Immature Granulocyte 0.05 0.00 - 0.30 k/uL   Zinc   Result Value Ref Range    Zinc 81.2 60.0 - 120.0 ug/dL   Ferritin   Result Value Ref Range    Ferritin 206 (H) 13 - 150 ug/L   Lipid Panel   Result Value Ref Range    Cholesterol 211 (H) <200 mg/dL    HDL 50 >40 mg/dL    LDL Cholesterol 119 0 - 130 mg/dL    Chol/HDL Ratio 4.2 <5    Triglycerides 212 (H) <150 mg/dL       :      1. KIANA treated with BiPAP    2.  Class 3 severe obesity due to excess calories with serious comorbidity and body mass index (BMI) of 60.0 to 69.9 in [facility])      Patient Active Problem List   Diagnosis    Morbid obesity with BMI of 60.0-69.9, adult ([facility]    Type 2 diabetes mellitus without complication, with long-term current use of insulin (HCC)    KIANA (obstructive sleep apnea)    Nocturnal hypoxia    Essential hypertension    PCOS (polycystic ovarian syndrome)    History of endometrial cancer    Elevated liver enzymes         Plan:      1. Patient cleared for bariatric surgery with moderate risk primarily related to severe sleep apnea. Instructed patient to bring her machine with her for use postop  Encourage CPAP use   Avoid sedative hypnotics and alcohol at bedtime  Weight loss  Exercise caution when driving and other high risk activities of not adequately treated for sleep apnea  Return in 1 year. .No orders of the defined types were placed in this encounter. No orders of the defined types were placed in this encounter. No follow-ups on file.        Electronically signed by Uriel Powers DO on 2/28/2022at 1:40 PM

## 2022-03-03 ENCOUNTER — NURSE ONLY (OUTPATIENT)
Dept: BARIATRICS/WEIGHT MGMT | Age: 57
End: 2022-03-03

## 2022-03-18 ENCOUNTER — OFFICE VISIT (OUTPATIENT)
Dept: BEHAVIORAL/MENTAL HEALTH CLINIC | Age: 57
End: 2022-03-18
Payer: COMMERCIAL

## 2022-03-18 DIAGNOSIS — E66.01 MORBID OBESITY WITH BMI OF 60.0-69.9, ADULT (HCC): Primary | ICD-10-CM

## 2022-03-18 PROCEDURE — 1036F TOBACCO NON-USER: CPT | Performed by: PSYCHOLOGIST

## 2022-03-18 PROCEDURE — 96137 PSYCL/NRPSYC TST PHY/QHP EA: CPT | Performed by: PSYCHOLOGIST

## 2022-03-18 PROCEDURE — 96131 PSYCL TST EVAL PHYS/QHP EA: CPT | Performed by: PSYCHOLOGIST

## 2022-03-18 PROCEDURE — 96130 PSYCL TST EVAL PHYS/QHP 1ST: CPT | Performed by: PSYCHOLOGIST

## 2022-03-18 PROCEDURE — 96136 PSYCL/NRPSYC TST PHY/QHP 1ST: CPT | Performed by: PSYCHOLOGIST

## 2022-03-18 PROCEDURE — 90791 PSYCH DIAGNOSTIC EVALUATION: CPT | Performed by: PSYCHOLOGIST

## 2022-03-18 NOTE — PROGRESS NOTES
Bariatric Pre-Surgical Psychology Initial Evaluation  Tyron Avalos M.A. Psychology Doctoral Trainee    Supervised by Arvind Cantrell, PhD  Licensed Clinical Psychologist ( [id number])    Visit Date: 3/18/2022   Time of appointment:  8:00-11:00   Time spent with patient: 180  Time spent writing report: 120  Interactive feedback provided on: 3/22/22    Breakdown of chrges:   25316- first hour assessment  07908 - half hour assessment   57299 - three additional units of assessment  19003 - first hour report writing   09481 - one additional unit of report writing and interpretive feedback    Pt provided informed consent for the behavioral health evaluation. Discussed with patient the limits of confidentiality (i.e. abuse reporting, suicide intervention, etc.) and purpose of evaluation. Pt indicated understanding. Identifying Data and Reason for Referral:  Alisa Garrett is a 55 y.o. female, who was seen for a pre-surgical psychological evaluation. As part of this evaluation, a clinical interview was conducted. Additionally, the patient was administered the [place] [address] (MMPI-3), Patient Health Questionnaire -9 (PHQ-9), Generalized Anxiety Disorder  7 (PAULO-7), Alcohol Use Disorders Identification Test (AUDIT), and Eating Disorders Examination Questionnaire (LARS-Q)    Weight History and Weight Loss Efforts:   Alisa Garrett reported that s/he has struggled with weight for as long as she can remember. Her highest reported weight has been 446lbs., which was her weight at age 49. Her lowest reported weight has been 125lbs. , which was her/his weight at age 26. Alisa Garrett described several dieting and weight loss attempts, including counting calories, restricting foods, and brand name diets. .      Overview of Current Eating and Exercise Patterns:   Alisa Garrett reported that s/he usually eats 3 meals per day and 1-2 snacks. There was Some evidence of binging.  There was Some evidence of significant emotional eating. Ness Mai indicated that her eating difficulties are often tied to stress. She has had success steadily losing weight over the last couple years by adhering to dietary changes. However, she feels as if she has reached a plateau and currently is having difficulty sustaining weight loss. Ness Mai reported that s/he engages in a limited amount of exercise, which includes walking and physical therapy for a back injury. .      Knowledge of Bariatric Surgery  Ness Mai was able to adequately describe the Sleeve Gastrectomy procedure as well as the modifications She will need to make after the surgery with respect to the amount, frequency and types of food. She was able to evidence realistic goals and also able to recognize that Her efforts will help create a good outcome. She was able to identify that there are individual differences in the response and refeeding process. She appeared to have Good knowledge of the risks and benefits of bariatric surgery and was able to identify risks of surgery which include infection, poor outcomes, and relapse. Motivation for Weight Loss  Ness Mai reported that her/his motivation for Sleeve Gastrectomy bariatric surgery is due to a desire to manage chronic health issues. Specifically, she wants to control a diabetes process and reduce her experience of chronic pain. .      Social History:         Living Arrangement: She currently lives with her , zulema, and two of her five children in a single family home. Work/Education: Ness Mai reported doing well throughout school, but leaving her senior year of high school to take a job. She earned her GED and went on to earn her associate's and bachelor's degrees in accounting. She currently works as an  at a local private practice. Social Support: She reported that She has adequate social support on which to rely during recovery from surgery.      Childhood/Family:    Relevant Psychiatric History:        Mercedes Zuñiga does not report a significant psychiatric history. She saw a counselor after the death of her mother as she struggled with her loss. She experienced a similar period of sadness after losing a sister. Otherwise, Mercedes Zuñiga reports not significant symptoms. Current Psychological Symptoms:  Mercedes Zuñiga currently experiences disrupted sleep, periods of fatigue, and currently experiences elevated stress and worry due to her work as an  during tax season. Relevant Medical History:  Mercedes Zuñiga had a  and hysterectomy, but otherwise reports being healthy. Substance Use:  TOBACCO:  She reports that she has never smoked. She has never used smokeless tobacco.  ALCOHOL:  She reports no history of alcohol use. OTHER SUBSTANCES: She reports no history of drug use. Family History:  Her family history includes Anemia in an other family member; Breast Cancer in an other family member; Cancer in her mother and another family member; Diabetes in her mother and another family member; Heart Disease in an other family member; High Blood Pressure in an other family member; Hypertension in her father. Mental Status:  Mood was within normal limits with congruent affect. Suicidal ideation was denied. Homicidal ideation was denied. Hygiene was good . Dress was appropriate. Behavior was Within Normal Limits with Yes observation or self-report of difficulties ambulating. Attitude was Cooperative and Friendly. Eye-contact was good. Speech: rate - WNL, rhythm -  WNL, volume - WNL  Verbalizations were goal directed and coherent. Thought processes were intact and goal-oriented without evidence of delusions, hallucinations, obsessions, or jose luis. Associations were characterized by intact cognitive processes. Pt was oriented to person, place, time, and general circumstances;  recent:  good and remote:  good.   Insight and judgment were estimated to be good, AEB, a good understanding of cyclical maladaptive patterns, and the ability to use insight to inform behavior change. Psychological Testing Results: The MMPI-3 is a measure of personality and psychiatric symptoms. It was examined for validity, and the profile indicates a valid and interpretable pattern of responding. There were no indications of over-reporting, although some under-reporting may be present (K T=68). In light of this consideration, this testing is considered a valid assessment of her personality and psychological symptoms. The following scale interpretations represent points of relative elevation and are presented in the context of potential under-reporting. West Merritt generally presents herself as psychologically healthy and well-adjusted. However, she may experience somatic symptoms and complaints (Kylah Quitter = 58). She may feel somewhat socially isolated (DSF T=58; SHANE T=58), and may describe herself as an introvert ((INTR T=54). Finally, West Merritt might engage in some compulsive behaviors (COMP T=56). West Merritt was also administered the PHQ-9 and PAULO-7 to measure depression and anxiety. She obtained a score of 1 on the PHQ-9, indicating minimal symptoms of depression and a score of 2 on the PAULO-7, indicating minimal experience of symptoms of anxiety. The AUDIT-C was also completed to assess her/his potential for an alcohol use disorder. She obtained a total score of 0 on this measure, which reflects abstinence from alcohol. West Merritt was also administered the Eating Disorder Examination Questionnaire (LARS-Q). Her scores indicate a strong desire to lose weight. .     Clinical Impression Summary:  West Merritt is a 55 y.o. female referred for a pre-surgical psychological evaluation to identify psychosocial issues that may complicate outcomes and provide recommendations for how to improve these potential problems. Overall, the patient presented as cooperative and motivated to participate in the evaluation process. Based upon clinical interview, behavioral observations, medical records review, and testing data, Tiffanie Cardona 's psychological suitability for bariatric surgery was predicted to be good. She is strongly motivated to lose weight. She also has good family support to help her access additional treatment if needed. She is currently psychologically healthy. and presents herself as motivated and resilient  She has shown the ability to modify her diet in the past few months, as well as maintain an exercise program and make good nutritional choices, which speaks well to her ability for behavior change. She is cognitively capable of understanding and complying with the dietary restrictions involved following her surgery. Diagnosis:  Tiffanie Cardona was seen today for psychiatric evaluation. Diagnoses and all orders for this visit:    Morbid obesity with BMI of 60.0-69.9, adult [facility])        Recommendations:    From a psychological perspective, Tiffanie Cardona presents as a good candidate for bariatric surgery at this time. The following recommendations are offered as ways to further improve post-surgical outcomes:    1. Following surgery, she may benefit from supportive therapy to help her/him cope with the dietary restrictions required of her/him. A group support model is recommended, if available. 2.  She would likely benefit from a nutrition consult as well to help guide her toward the best food choices. Tiffanie Cardona may further benefit from ongoing support to concretize behavior and habit changes related to food, meals, and nutrition. 3. Tiffanie Cardona may benefit elena a phyical therapy consult to help her build strength and flexibility. In her young life, she was an athlete and desires to be more physically active, but worries about areas such as her lower back, hips, and knees. Physical therapy may help her build strength, flexibility, and confidence in her body.     4. Patient reports a history of grief and loss due to acute and chronic medical conditions. Medical providers should be sensitive to this when examining Cherelle Rizzo.       Electronically signed by Renata Aguilar on 3/18/22 at 9:55 AM EDT

## 2022-03-25 ENCOUNTER — TELEMEDICINE (OUTPATIENT)
Dept: BARIATRICS/WEIGHT MGMT | Age: 57
End: 2022-03-25
Payer: COMMERCIAL

## 2022-03-25 DIAGNOSIS — E28.2 PCOS (POLYCYSTIC OVARIAN SYNDROME): ICD-10-CM

## 2022-03-25 DIAGNOSIS — G47.34 NOCTURNAL HYPOXIA: Chronic | ICD-10-CM

## 2022-03-25 DIAGNOSIS — R74.8 ELEVATED LIVER ENZYMES: ICD-10-CM

## 2022-03-25 DIAGNOSIS — E11.9 TYPE 2 DIABETES MELLITUS WITHOUT COMPLICATION, WITH LONG-TERM CURRENT USE OF INSULIN (HCC): Primary | ICD-10-CM

## 2022-03-25 DIAGNOSIS — E66.01 MORBID OBESITY WITH BMI OF 60.0-69.9, ADULT (HCC): ICD-10-CM

## 2022-03-25 DIAGNOSIS — Z79.4 TYPE 2 DIABETES MELLITUS WITHOUT COMPLICATION, WITH LONG-TERM CURRENT USE OF INSULIN (HCC): Primary | ICD-10-CM

## 2022-03-25 DIAGNOSIS — Z85.42 HISTORY OF ENDOMETRIAL CANCER: ICD-10-CM

## 2022-03-25 DIAGNOSIS — G47.33 OSA (OBSTRUCTIVE SLEEP APNEA): Chronic | ICD-10-CM

## 2022-03-25 PROCEDURE — 3017F COLORECTAL CA SCREEN DOC REV: CPT | Performed by: NURSE PRACTITIONER

## 2022-03-25 PROCEDURE — G8427 DOCREV CUR MEDS BY ELIG CLIN: HCPCS | Performed by: NURSE PRACTITIONER

## 2022-03-25 PROCEDURE — 99213 OFFICE O/P EST LOW 20 MIN: CPT | Performed by: NURSE PRACTITIONER

## 2022-03-25 PROCEDURE — 2022F DILAT RTA XM EVC RTNOPTHY: CPT | Performed by: NURSE PRACTITIONER

## 2022-03-25 PROCEDURE — 3052F HG A1C>EQUAL 8.0%<EQUAL 9.0%: CPT | Performed by: NURSE PRACTITIONER

## 2022-03-25 NOTE — PROGRESS NOTES
Medical Weight Management Progress Note    TELEHEALTH EVALUATION -- Audio/Visual ([facility])    Subjective      Patient being seen for medically supervised weight loss for the chronic conditions of Type 2 DM, HTN, PCOS, KIANA, Elevated Liver Enzymes, Hx Endometrial Cancer. She is working on the behavior changes discussed at the initial appointment. Patient continues on diet plan. Physical activity includes walking. Weight loss of 0 lbs since last visit. Uses CPAP. Psych eval completed and clearance received. Needs to schedule EGD. Diabetes regimen includes Trulicity, metformin, and Levemir insulin. Last A1C 8.0%. No current issues. Working toward bariatric surgery:    [x] Sleeve Gastrectomy                                                           [] Najma-en-Y Gastric Bypass    Allergies: Allergies   Allergen Reactions    Latex Itching and Rash    Penicillins Anaphylaxis and Other (See Comments)     Passes out, all penicillins        Past Medical History:     Past Medical History:   Diagnosis Date    Acute left-sided low back pain with left-sided sciatica     Arthritis     BMI 70 and over, adult ([address]     [facility]     Diabetes mellitus [facility]     High blood pressure     Hypertension     Sleep apnea    .     Past Surgical History:  Past Surgical History:   Procedure Laterality Date     SECTION      HYSTERECTOMY, TOTAL ABDOMINAL      OTHER SURGICAL HISTORY      [id number] multiple procedures to remove cancer       Family History:  Family History   Problem Relation Age of Onset    Cancer Mother         thyroid    Diabetes Mother     Anemia Other     Cancer Other     Breast Cancer Other     Diabetes Other     High Blood Pressure Other     Heart Disease Other     Hypertension Father        Social History:  Social History     Socioeconomic History    Marital status:      Spouse name: Not on file    Number of children: Not on file    Years of education: Not on file    Highest education level: Not on file   Occupational History    Not on file   Tobacco Use    Smoking status: Never Smoker    Smokeless tobacco: Never Used   Vaping Use    Vaping Use: Never used   Substance and Sexual Activity    Alcohol use: No    Drug use: No    Sexual activity: Not on file   Other Topics Concern    Not on file   Social History Narrative    Not on file     Social Determinants of Health     Financial Resource Strain: Low Risk     Difficulty of Paying Living Expenses: Not hard at all   Food Insecurity: No Food Insecurity    Worried About [facility] in the Last Year: Never true    [facility] in the Last Year: Never true   Transportation Needs:     Lack of Transportation (Medical): Not on file    Lack of Transportation (Non-Medical):  Not on file   Physical Activity:     Days of Exercise per Week: Not on file    Minutes of Exercise per Session: Not on file   Stress:     Feeling of Stress : Not on file   Social Connections:     Frequency of Communication with Friends and Family: Not on file    Frequency of Social Gatherings with Friends and Family: Not on file    Attends Orthodoxy Services: Not on file    Active Member of [address] [place] [address] or Organizations: Not on file    Attends Club or Organization Meetings: Not on file    Marital Status: Not on file   Intimate Partner Violence:     Fear of Current or Ex-Partner: Not on file    Emotionally Abused: Not on file    Physically Abused: Not on file    Sexually Abused: Not on file   Housing Stability:     Unable to Pay for Housing in the Last Year: Not on file    Number of [place] in the Last Year: Not on file    Unstable Housing in the Last Year: Not on file       Current Medications:  Current Outpatient Medications   Medication Sig Dispense Refill    vitamin D (ERGOCALCIFEROL) 1.25 MG (93017 UT) CAPS capsule Take 1 capsule by mouth once a week for 12 doses 12 capsule 0    insulin detemir (LEVEMIR FLEXTOUCH) 100 UNIT/ML injection pen Inject 35 Units into the skin 2 times daily If sugars not down increase to 40 units BID 63 mL 0    Insulin Pen Needle (B-D ULTRAFINE III SHORT PEN) 31G X 8 MM MISC Inject 1 each into the skin 2 times daily May substitute with any brand 200 each 11    Continuous Blood Gluc Sensor (FREESTYLE LUIS ANGEL 14 DAY SENSOR) MISC 1 Device by Does not apply route in the morning, at noon, and at bedtime Uncontrolled type 2 DM on insulin, shots BID 2 each 5    Continuous Blood Gluc  (FREESTYLE LUIS ANGEL 2 READER) MELQUIADES 1 Device by Does not apply route in the morning, at noon, and at bedtime Type 2 DM on insulin BID, uncontrolled DM 1 each 1    Handicap Placard MISC by Does not apply route For orthopedic disease. Expires 12/6/2026 1 each 0    Dulaglutide (TRULICITY) 1.5 OI/9.6QL SOPN Inject 1.5 MG into the skin once a week. 4 pen 4    metFORMIN (GLUCOPHAGE) 500 MG tablet Take 1 tablet by mouth 2 times daily (with meals) 180 tablet 3    triamterene-hydroCHLOROthiazide (MAXZIDE) 75-50 MG per tablet TAKE 1 TABLET BY MOUTH EVERY DAY 90 tablet 3    lisinopril (PRINIVIL;ZESTRIL) 10 MG tablet Take 1 tablet by mouth daily TAKE 1 TABLET BY MOUTH EVERY DAY 90 tablet 2    Lancets MISC 1 each by Does not apply route 3 times daily 100 each 5    blood glucose monitor strips Test 3  times a day & as needed for symptoms of irregular blood glucose. Dispense sufficient amount for indicated testing frequency plus additional to accommodate PRN testing needs. Type 2 DM on insulin 100 strip 3    Saline (SIMPLY SALINE) 0.9 % AERS 2 sprays by Nasal route 4 times daily  0     No current facility-administered medications for this visit. Vital Signs: There were no vitals taken for this visit. Review of Systems - A review of systems was performed. All was negative unless otherwise documented in HPI. Constitutional: Negative for fever, chills.    HENT: Negative for hearing loss and trouble swallowing. Eyes: Negative for visual disturbance. Respiratory: Negative for cough, shortness of breath. Cardiovascular: Negative for chest pain. Gastrointestinal: Negative for nausea, vomiting, abdominal pain, diarrhea, constipation, blood in stool and abdominal distention. Endocrine: Negative for polydipsia, polyphagia and polyuria. Genitourinary: Negative for dysuria or hematuria. Musculoskeletal: Negative for myalgias, joint swelling. Skin: Negative for pallor and rash. Neurological: Negative for dizziness, light-headedness and headaches. Psychiatric/Behavioral: Negative for sleep disturbance and dysphoric mood. Objective:      Physical Exam     General: Well-developed and well-nourished. No acute distress. HEENT: Normocephalic. Pulmonary/Chest: Normal effort. Neurological:  Alert and oriented to person, place, and time. Psychiatric: Normal mood and affect. Speech and behavior normal.     Assessment:       Diagnosis Orders   1. Type 2 diabetes mellitus without complication, with long-term current use of insulin ([address]     2. Morbid obesity with BMI of 60.0-69.9, adult ([address]     3. KIANA (obstructive sleep apnea)     4. Nocturnal hypoxia     5. PCOS (polycystic ovarian syndrome)     6. History of endometrial cancer     7. Elevated liver enzymes         Plan:    Dietitian visit follow up call. Follow-up  Return in about 1 month (around 4/25/2022). Orders this encounter:  No orders of the defined types were placed in this encounter. Prescriptions this encounter:  No orders of the defined types were placed in this encounter.       Electronically signed by:  ANGELES Parham    Pursuant to the emergency declaration under the [facility] waiver authority and the Coronavirus Preparedness and [employer] Act, this Virtual  Visit was conducted, with patient's consent, to reduce the patient's risk of exposure to COVID-19 and provide continuity of care for an established patient. Services were provided through a video synchronous discussion virtually to substitute for in-person clinic visit. Patient was in her home and provider was in the weight management clinic.

## 2022-03-25 NOTE — PROGRESS NOTES
Medical Nutrition Therapy   Metabolic and Bariatric Surgery         Supervised diet and exercise preparation  Visit 3 out of 6  Pt reports:      Changes in eating patterns to promote health are noted below on the goals number 19-22    Vitals: Wt Readings from Last 3 Encounters:   02/28/22 (!) 365 lb (165.6 kg)   02/22/22 (!) 365 lb (165.6 kg)   02/17/22 (!) 363 lb (164.7 kg)         Nutrition Assessment:   PES: Knowledge deficit related to healthy behaviors that support weight management post weight loss surgery as evidenced by There is no height or weight on file to calculate BMI. Nutrition Assessment of Goal Attainment:  TREATMENT GOALS:    1. Pt  Completed 2 out of 3 goals. 2.TREATMENT GOALS FOR UPCOMING WEEK: continue all previous goals and add: # 0    All goals were planned with and agreed on by the patient. I want to improve my health because I want to live. appt # NA G What is your next step? C 1 2 3 4 5 6 7 8 9     0  1 I will read the education binder provided to me and the   x 100             0  2 I will make my pschological evaluation appoinment. x 100             3  3 I will bring this goal card to every appointment. 100 0 VV            x 4 I will eliminate all tobacco/nicotine. x 5 I will limit alcoholic beverages to 4-2YM per week. 6 I will limit dining out to 3 times per week or less. x 7 I will eliminate sugary beverages. x 8 I will eliminate carbonated beverages. 9 I will eliminate drinking with a straw. 10 I will limit caffeinated beverages to 16oz daily. 3  11 I will limit log my exercise daily. 100 0 100           2  12 I will determine my calcium and mvi plan. x   0             13 I will have 1-2 servings of lean protein present at each meal and minimeal.                 14 I will eat every 3-5 hours. 15 I will drink 64oz of fluid daily. 16 I will eat slowly during meals and snacks. 17 I will limit fluids 4oz before after and during meals. 18 I will eat protein first at all meals followed by vegetables,  Fruit and lastly whole grains. 3  19 My first weight neutral approach is:  I will limit eating after 7pm to a minimeal only. 100 100             20 My second weight neutral approach is:                 21  My Thirds weight neutral approach is:                 22                  23                  24                  25                    Questions asked for goal understanding:                                                  Do you understand your goals? Do you have the information you need to achieve your goals? Do you have any questions  right now? [x]  Consistent goal achievement in the program thus far and further success with goals is expected. []  Unable to consistently make progress in goal achievement. At this time patient is not moving forward  in developing the skills needed for success after surgery. Plan:    Continue to follow monthly and review goals.          [x]  Nutrition visits complete    []

## 2022-04-07 ENCOUNTER — NURSE ONLY (OUTPATIENT)
Dept: BARIATRICS/WEIGHT MGMT | Age: 57
End: 2022-04-07

## 2022-04-20 ENCOUNTER — OFFICE VISIT (OUTPATIENT)
Dept: BARIATRICS/WEIGHT MGMT | Age: 57
End: 2022-04-20
Payer: COMMERCIAL

## 2022-04-20 VITALS
HEART RATE: 106 BPM | HEIGHT: 64 IN | BODY MASS INDEX: 50.02 KG/M2 | SYSTOLIC BLOOD PRESSURE: 129 MMHG | WEIGHT: 293 LBS | DIASTOLIC BLOOD PRESSURE: 60 MMHG

## 2022-04-20 DIAGNOSIS — E28.2 PCOS (POLYCYSTIC OVARIAN SYNDROME): ICD-10-CM

## 2022-04-20 DIAGNOSIS — G47.33 OSA (OBSTRUCTIVE SLEEP APNEA): Chronic | ICD-10-CM

## 2022-04-20 DIAGNOSIS — Z79.4 TYPE 2 DIABETES MELLITUS WITHOUT COMPLICATION, WITH LONG-TERM CURRENT USE OF INSULIN (HCC): Primary | ICD-10-CM

## 2022-04-20 DIAGNOSIS — I10 ESSENTIAL HYPERTENSION: ICD-10-CM

## 2022-04-20 DIAGNOSIS — R74.8 ELEVATED LIVER ENZYMES: ICD-10-CM

## 2022-04-20 DIAGNOSIS — E11.9 TYPE 2 DIABETES MELLITUS WITHOUT COMPLICATION, WITH LONG-TERM CURRENT USE OF INSULIN (HCC): Primary | ICD-10-CM

## 2022-04-20 DIAGNOSIS — E66.01 MORBID OBESITY WITH BMI OF 60.0-69.9, ADULT (HCC): ICD-10-CM

## 2022-04-20 DIAGNOSIS — Z85.42 HISTORY OF ENDOMETRIAL CANCER: ICD-10-CM

## 2022-04-20 PROCEDURE — 3017F COLORECTAL CA SCREEN DOC REV: CPT | Performed by: NURSE PRACTITIONER

## 2022-04-20 PROCEDURE — 2022F DILAT RTA XM EVC RTNOPTHY: CPT | Performed by: NURSE PRACTITIONER

## 2022-04-20 PROCEDURE — 1036F TOBACCO NON-USER: CPT | Performed by: NURSE PRACTITIONER

## 2022-04-20 PROCEDURE — 99213 OFFICE O/P EST LOW 20 MIN: CPT | Performed by: NURSE PRACTITIONER

## 2022-04-20 PROCEDURE — 3052F HG A1C>EQUAL 8.0%<EQUAL 9.0%: CPT | Performed by: NURSE PRACTITIONER

## 2022-04-20 PROCEDURE — G8417 CALC BMI ABV UP PARAM F/U: HCPCS | Performed by: NURSE PRACTITIONER

## 2022-04-20 PROCEDURE — G8427 DOCREV CUR MEDS BY ELIG CLIN: HCPCS | Performed by: NURSE PRACTITIONER

## 2022-04-20 NOTE — PROGRESS NOTES
Medical Nutrition Therapy   Metabolic and Bariatric Surgery         Supervised diet and exercise preparation  Visit 4 out of 6  Pt reports:      Changes in eating patterns to promote health are noted below on the goals number 19-22    Vitals: Wt Readings from Last 3 Encounters:   04/20/22 (!) 361 lb (163.7 kg)   02/28/22 (!) 365 lb (165.6 kg)   02/22/22 (!) 365 lb (165.6 kg)         Nutrition Assessment:   PES: Knowledge deficit related to healthy behaviors that support weight management post weight loss surgery as evidenced by Body mass index is 61.97 kg/m². Nutrition Assessment of Goal Attainment:  TREATMENT GOALS:    1. Pt  Completed 1 out of 2 goals. 2.TREATMENT GOALS FOR UPCOMING WEEK: continue all previous goals and add: # 13 & 14    All goals were planned with and agreed on by the patient. I want to improve my health because I want to live. appt # NA G What is your next step? C 1 2 3 4 5 6 7 8 9     0  1 I will read the education binder provided to me and the   x 100             0  2 I will make my pschological evaluation appoinment. x 100             4  3 I will bring this goal card to every appointment. 100 0 VV           x 4 I will eliminate all tobacco/nicotine. x 5 I will limit alcoholic beverages to 7-2HO per week. x 6 I will limit dining out to 3 times per week or less. x 7 I will eliminate sugary beverages. x 8 I will eliminate carbonated beverages. x 9 I will eliminate drinking with a straw. x 10 I will limit caffeinated beverages to 16oz daily. 4  11 I will limit log my exercise daily. 100 0 100  0         2  12 I will determine my calcium and mvi plan. x   0           4  13 I will have 1-2 servings of lean protein present at each meal and minimeal.               4  14 I will eat every 3-5 hours. x 15 I will drink 64oz of fluid daily.                  16 I will eat slowly during meals and snacks. 17 I will limit fluids 4oz before after and during meals. 18 I will eat protein first at all meals followed by vegetables,  Fruit and lastly whole grains. 3  19 My first weight neutral approach is:  I will limit eating after 7pm to a minimeal only. 100 100             20 My second weight neutral approach is:                 21  My Thirds weight neutral approach is:                 22                  23                  24                  25                    Questions asked for goal understanding:                                                  Do you understand your goals? Do you have the information you need to achieve your goals? Do you have any questions  right now? []  Consistent goal achievement in the program thus far and further success with goals is expected. []  Unable to consistently make progress in goal achievement. At this time patient is not moving forward  in developing the skills needed for success after surgery. Plan:    Continue to follow monthly and review goals.          [x]  Nutrition visits complete    []

## 2022-04-20 NOTE — PROGRESS NOTES
Medical Weight Management Progress Note    Subjective     Patient being seen for medically supervised weight loss for the chronic conditions of Type 2 DM, HTN, PCOS, KIANA, Elevated Liver Enzymes, Hx Endometrial Cancer. She is working on the behavior changes discussed at the initial appointment. Patient continues on diet plan. Physical activity includes walking. Weight loss of 4 lbs since last visit. Uses CPAP. Psych eval completed and clearance received. EGD scheduled 22. Diabetes regimen includes Trulicity, metformin, and Levemir insulin. Last A1C 8.0%. No current issues.     Working toward bariatric surgery:    [x]? Sleeve Gastrectomy                                                           []? Najma-en-Y Gastric Bypass  Allergies: Allergies   Allergen Reactions    Latex Itching and Rash    Penicillins Anaphylaxis and Other (See Comments)     Passes out, all penicillins        Past Medical History:     Past Medical History:   Diagnosis Date    Acute left-sided low back pain with left-sided sciatica     Arthritis     BMI 70 and over, adult ([address]     Cancer [facility]     Diabetes mellitus ([facility]     High blood pressure     Hypertension     Sleep apnea    .     Past Surgical History:  Past Surgical History:   Procedure Laterality Date     SECTION      HYSTERECTOMY, TOTAL ABDOMINAL      OTHER SURGICAL HISTORY      2425-0427 multiple procedures to remove cancer       Family History:  Family History   Problem Relation Age of Onset    Cancer Mother         thyroid    Diabetes Mother     Anemia Other     Cancer Other     Breast Cancer Other     Diabetes Other     High Blood Pressure Other     Heart Disease Other     Hypertension Father        Social History:  Social History     Socioeconomic History    Marital status:      Spouse name: Not on file    Number of children: Not on file    Years of education: Not on file    Highest education level: Not on file Occupational History    Not on file   Tobacco Use    Smoking status: Never Smoker    Smokeless tobacco: Never Used   Vaping Use    Vaping Use: Never used   Substance and Sexual Activity    Alcohol use: No    Drug use: No    Sexual activity: Not on file   Other Topics Concern    Not on file   Social History Narrative    Not on file     Social Determinants of Health     Financial Resource Strain: Low Risk     Difficulty of Paying Living Expenses: Not hard at all   Food Insecurity: No Food Insecurity    Worried About [facility] in the Last Year: Never true    [facility] in the Last Year: Never true   Transportation Needs:     Lack of Transportation (Medical): Not on file    Lack of Transportation (Non-Medical):  Not on file   Physical Activity:     Days of Exercise per Week: Not on file    Minutes of Exercise per Session: Not on file   Stress:     Feeling of Stress : Not on file   Social Connections:     Frequency of Communication with Friends and Family: Not on file    Frequency of Social Gatherings with Friends and Family: Not on file    Attends [employer] Services: Not on file    Active Member of [address] [place] [address] or Organizations: Not on file    Attends Club or Organization Meetings: Not on file    Marital Status: Not on file   Intimate Partner Violence:     Fear of Current or Ex-Partner: Not on file    Emotionally Abused: Not on file    Physically Abused: Not on file    Sexually Abused: Not on file   Housing Stability:     Unable to Pay for Housing in the Last Year: Not on file    Number of [place] in the Last Year: Not on file    Unstable Housing in the Last Year: Not on file       Current Medications:  Current Outpatient Medications   Medication Sig Dispense Refill    vitamin D (ERGOCALCIFEROL) 1.25 MG (67236 UT) CAPS capsule Take 1 capsule by mouth once a week for 12 doses 12 capsule 0    insulin detemir (LEVEMIR FLEXTOUCH) 100 UNIT/ML injection pen Inject 35 Units into the skin 2 times daily If sugars not down increase to 40 units BID 63 mL 0    Insulin Pen Needle (B-D ULTRAFINE III SHORT PEN) 31G X 8 MM MISC Inject 1 each into the skin 2 times daily May substitute with any brand 200 each 11    Continuous Blood Gluc Sensor (FREESTYLE LUIS ANGEL 14 DAY SENSOR) MISC 1 Device by Does not apply route in the morning, at noon, and at bedtime Uncontrolled type 2 DM on insulin, shots BID 2 each 5    Continuous Blood Gluc  (FREESTYLE LUIS ANGEL 2 READER) MELQUIADES 1 Device by Does not apply route in the morning, at noon, and at bedtime Type 2 DM on insulin BID, uncontrolled DM 1 each 1    Handicap Placard MISC by Does not apply route For orthopedic disease. Expires 12/6/2026 1 each 0    Dulaglutide (TRULICITY) 1.5 UF/6.5LZ SOPN Inject 1.5 MG into the skin once a week. 4 pen 4    metFORMIN (GLUCOPHAGE) 500 MG tablet Take 1 tablet by mouth 2 times daily (with meals) 180 tablet 3    triamterene-hydroCHLOROthiazide (MAXZIDE) 75-50 MG per tablet TAKE 1 TABLET BY MOUTH EVERY DAY 90 tablet 3    lisinopril (PRINIVIL;ZESTRIL) 10 MG tablet Take 1 tablet by mouth daily TAKE 1 TABLET BY MOUTH EVERY DAY 90 tablet 2    Lancets MISC 1 each by Does not apply route 3 times daily 100 each 5    blood glucose monitor strips Test 3  times a day & as needed for symptoms of irregular blood glucose. Dispense sufficient amount for indicated testing frequency plus additional to accommodate PRN testing needs. Type 2 DM on insulin 100 strip 3    Saline (SIMPLY SALINE) 0.9 % AERS 2 sprays by Nasal route 4 times daily  0     No current facility-administered medications for this visit. Vital Signs:  /60 (Site: Right Lower Arm, Position: Sitting, Cuff Size: Large Adult)   Pulse 106   Ht 5' 4\" (1.626 m)   Wt (!) 361 lb (163.7 kg)   BMI 61.97 kg/m²     BMI/Height/Weight:  Body mass index is 61.97 kg/m². Review of Systems - A review of systems was performed.   All was negative unless otherwise today. Patient was encouraged to journal all food intake. Keep calorie level at approximately 8079-8398. Protein intake is to be a minimum of 60-80 grams per day. Water drinking was encouraged with a goal of 64oz-128oz daily. Beverages to be calorie free except for milk. Every other beverage should be water. Avoid soda. Continue to increase level of physical activity. Encouraged use of exercise log. Last A1C 8.0%. Follow-up  Return in about 1 month (around 5/20/2022). Orders this encounter:  No orders of the defined types were placed in this encounter. Prescriptions this encounter:  No orders of the defined types were placed in this encounter.       Electronically signed by:  Carole Castillo CNP

## 2022-04-22 ENCOUNTER — ANESTHESIA (OUTPATIENT)
Dept: ENDOSCOPY | Age: 57
End: 2022-04-22

## 2022-04-22 ENCOUNTER — ANESTHESIA EVENT (OUTPATIENT)
Dept: ENDOSCOPY | Age: 57
End: 2022-04-22

## 2022-04-22 ASSESSMENT — ENCOUNTER SYMPTOMS: SHORTNESS OF BREATH: 1

## 2022-04-22 NOTE — ANESTHESIA PRE PROCEDURE
Department of Anesthesiology  Preprocedure Note       Name:  Sergio Boucher   Age:  55 y.o.  :  1965                                          MRN:  [id number]         Date:  2022      Surgeon: Tavia Chavis):  Loy Davies DO    Procedure: Procedure(s):  EGD ESOPHAGOGASTRODUODENOSCOPY    Medications prior to admission:   Prior to Admission medications    Medication Sig Start Date End Date Taking? Authorizing Provider   vitamin D (ERGOCALCIFEROL) 1.25 MG (64649 UT) CAPS capsule Take 1 capsule by mouth once a week for 12 doses 22  GIOVANI Jacobson - CNP   insulin detemir (LEVEMIR FLEXTOUCH) 100 UNIT/ML injection pen Inject 35 Units into the skin 2 times daily If sugars not down increase to 40 units BID 22  Allie Aguilera MD   Insulin Pen Needle (B-D ULTRAFINE III SHORT PEN) 31G X 8 MM MISC Inject 1 each into the skin 2 times daily May substitute with any brand 22   Allie Aguilera MD   Continuous Blood Gluc Sensor (FREESTYLE LUIS ANGEL 14 DAY SENSOR) MISC 1 Device by Does not apply route in the morning, at noon, and at bedtime Uncontrolled type 2 DM on insulin, shots BID 22   Allie Aguilera MD   Continuous Blood Gluc  (FREESTYLE LUIS ANGEL 2 READER) MELQUIADES 1 Device by Does not apply route in the morning, at noon, and at bedtime Type 2 DM on insulin BID, uncontrolled DM 22   Allie Aguilera MD   Handicap Placard MISC by Does not apply route For orthopedic disease. Expires 2026   Allie Aguilera MD   Dulaglutide (TRULICITY) 1.5 AX/2.5DX SOPN Inject 1.5 MG into the skin once a week.  21   Allie Aguilera MD   metFORMIN (GLUCOPHAGE) 500 MG tablet Take 1 tablet by mouth 2 times daily (with meals) 21   Allie Aguilera MD   triamterene-hydroCHLOROthiazide (MAXZIDE) 75-50 MG per tablet TAKE 1 TABLET BY MOUTH EVERY DAY 21   Allie Aguilera MD   lisinopril (PRINIVIL;ZESTRIL) 10 MG tablet Take 1 tablet by mouth daily TAKE 1 TABLET BY MOUTH EVERY DAY 21   Cullen Kerr MD   Lancets MISC 1 each by Does not apply route 3 times daily 21   Cullen Kerr MD   blood glucose monitor strips Test 3  times a day & as needed for symptoms of irregular blood glucose. Dispense sufficient amount for indicated testing frequency plus additional to accommodate PRN testing needs. Type 2 DM on insulin 21   Cullen Kerr MD   Saline (SIMPLY SALINE) 0.9 % AERS 2 sprays by Nasal route 4 times daily 19   Cullen Kerr MD       Current medications:    Current Facility-Administered Medications   Medication Dose Route Frequency Provider Last Rate Last Admin    0.9 % sodium chloride infusion   IntraVENous Continuous John Oakes, DO           Allergies:     Allergies   Allergen Reactions    Latex Itching and Rash    Penicillins Anaphylaxis and Other (See Comments)     Passes out, all penicillins        Problem List:    Patient Active Problem List   Diagnosis Code    Morbid obesity with BMI of 60.0-69.9, adult ([facility]) E66.01, Z68.44    Type 2 diabetes mellitus without complication, with long-term current use of insulin ([facility]) E11.9, Z79.4    KIANA (obstructive sleep apnea) G47.33    Nocturnal hypoxia G47.34    Essential hypertension I10    PCOS (polycystic ovarian syndrome) E28.2    History of endometrial cancer Z85.42    Elevated liver enzymes R74.8       Past Medical History:        Diagnosis Date    Acute left-sided low back pain with left-sided sciatica     Arthritis     BMI 70 and over, adult [facility]     Cancer ([facility]     endometrial    Diabetes mellitus ([facility]     High blood pressure     Hypertension     Sleep apnea        Past Surgical History:        Procedure Laterality Date     SECTION      HYSTERECTOMY, TOTAL ABDOMINAL      OTHER SURGICAL HISTORY      5137-2789 multiple procedures to remove cancer       Social History:    Social History     Tobacco Use    Smoking status: Never Smoker    Smokeless tobacco: Never Used   Substance Use Topics    Alcohol use: No                                Counseling given: Not Answered      Vital Signs (Current):   Vitals:    04/22/22 0827   BP: 125/76   Pulse: 106   Resp: 16   Temp: 36.2 °C (97.2 °F)   TempSrc: Infrared   SpO2: 97%   Weight: (!) 361 lb (163.7 kg)   Height: 5' 4\" (1.626 m)                                              BP Readings from Last 3 Encounters:   04/22/22 125/76   04/20/22 129/60   02/28/22 133/86       NPO Status: Time of last liquid consumption: 2100                        Time of last solid consumption: 0730                        Date of last liquid consumption: 04/21/22                        Date of last solid food consumption: 04/21/22    BMI:   Wt Readings from Last 3 Encounters:   04/22/22 (!) 361 lb (163.7 kg)   04/20/22 (!) 361 lb (163.7 kg)   02/28/22 (!) 365 lb (165.6 kg)     Body mass index is 61.97 kg/m².     CBC:   Lab Results   Component Value Date    WBC 7.7 02/21/2022    RBC 3.82 02/21/2022    HGB 12.0 02/21/2022    HCT 36.6 02/21/2022    MCV 95.8 02/21/2022    RDW 13.1 02/21/2022     02/21/2022       CMP:   Lab Results   Component Value Date     02/21/2022     02/21/2022    K 4.4 02/21/2022    K 4.4 02/21/2022    CL 97 02/21/2022    CL 97 02/21/2022    CO2 20 02/21/2022    CO2 20 02/21/2022    BUN 12 02/21/2022    BUN 12 02/21/2022    CREATININE 0.55 02/21/2022    CREATININE 0.55 02/21/2022    GFRAA >60 02/21/2022    GFRAA >60 02/21/2022    LABGLOM >60 02/21/2022    LABGLOM >60 02/21/2022    GLUCOSE 144 02/21/2022    GLUCOSE 99 01/08/2011    PROT 6.6 02/21/2022    PROT 6.6 02/21/2022    CALCIUM 9.1 02/21/2022    CALCIUM 9.1 02/21/2022    BILITOT <0.10 02/21/2022    BILITOT <0.10 02/21/2022    ALKPHOS 88 02/21/2022    ALKPHOS 88 02/21/2022    AST 29 02/21/2022    AST 29 02/21/2022    ALT 52 02/21/2022    ALT 52 02/21/2022       POC Tests: No results for input(s): POCGLU, POCNA, POCK, POCCL, POCBUN, POCHEMO, POCHCT in the last 72 hours. Coags: No results found for: PROTIME, INR, APTT    HCG (If Applicable): No results found for: PREGTESTUR, PREGSERUM, HCG, HCGQUANT     ABGs: No results found for: PHART, PO2ART, JYF9ATH, PHJ4OLZ, BEART, G0IABBCU     Type & Screen (If Applicable):  No results found for: LABABO, LABRH    Drug/Infectious Status (If Applicable):  No results found for: HIV, HEPCAB    COVID-19 Screening (If Applicable): No results found for: COVID19        Anesthesia Evaluation  Patient summary reviewed and Nursing notes reviewed  Airway: Mallampati: II  TM distance: >3 FB     Mouth opening: > = 3 FB Dental: normal exam         Pulmonary:   (+) shortness of breath:  sleep apnea: on CPAP,  decreased breath sounds,                            ROS comment: Nocturnal hypoxia  Concern for pickwickian syndrome   Cardiovascular:  Exercise tolerance: poor (<4 METS),   (+) hypertension:, MENDEZ:,         Rhythm: regular  Rate: normal                    Neuro/Psych:                ROS comment: Chronic low back pain with left sciatice GI/Hepatic/Renal:   (+) morbid obesity         ROS comment: Elevated liver enzymes. Endo/Other:    (+) DiabetesType II DM, using insulin, : arthritis: OA., malignancy/cancer (Cervical). Abdominal:   (+) obese,     Abdomen: soft. Vascular: Other Findings:           Anesthesia Plan      MAC     ASA 4       Induction: intravenous. Anesthetic plan and risks discussed with patient.                       GIOVANI Mejias - STEVE   4/22/2022

## 2022-04-25 ENCOUNTER — PATIENT MESSAGE (OUTPATIENT)
Dept: BARIATRICS/WEIGHT MGMT | Age: 57
End: 2022-04-25

## 2022-04-25 DIAGNOSIS — E66.01 MORBID OBESITY WITH BMI OF 60.0-69.9, ADULT (HCC): ICD-10-CM

## 2022-04-25 DIAGNOSIS — Z85.42 HISTORY OF ENDOMETRIAL CANCER: ICD-10-CM

## 2022-04-25 DIAGNOSIS — E28.2 PCOS (POLYCYSTIC OVARIAN SYNDROME): ICD-10-CM

## 2022-04-25 DIAGNOSIS — G47.33 OSA (OBSTRUCTIVE SLEEP APNEA): ICD-10-CM

## 2022-04-25 DIAGNOSIS — I10 ESSENTIAL HYPERTENSION: ICD-10-CM

## 2022-04-25 DIAGNOSIS — E11.9 TYPE 2 DIABETES MELLITUS WITHOUT COMPLICATION, WITH LONG-TERM CURRENT USE OF INSULIN (HCC): Primary | ICD-10-CM

## 2022-04-25 DIAGNOSIS — Z79.4 TYPE 2 DIABETES MELLITUS WITHOUT COMPLICATION, WITH LONG-TERM CURRENT USE OF INSULIN (HCC): Primary | ICD-10-CM

## 2022-04-25 NOTE — TELEPHONE ENCOUNTER
From: Ernesto Hatch  To: Dr. Amara Watts: 4/25/2022 8:59 AM EDT  Subject: Heart Doctor Appointment     Hi,    My EGD was canceled last Friday. Dr. Mulugeta Carreon would like me to see a heart doctor. Can I please make an appointment with someone.     Thank you,  William Graves

## 2022-05-05 ENCOUNTER — NURSE ONLY (OUTPATIENT)
Dept: BARIATRICS/WEIGHT MGMT | Age: 57
End: 2022-05-05

## 2022-05-16 ENCOUNTER — TELEMEDICINE (OUTPATIENT)
Dept: BARIATRICS/WEIGHT MGMT | Age: 57
End: 2022-05-16
Payer: COMMERCIAL

## 2022-05-16 DIAGNOSIS — Z79.4 TYPE 2 DIABETES MELLITUS WITHOUT COMPLICATION, WITH LONG-TERM CURRENT USE OF INSULIN (HCC): Primary | ICD-10-CM

## 2022-05-16 DIAGNOSIS — E66.01 MORBID OBESITY WITH BMI OF 60.0-69.9, ADULT (HCC): ICD-10-CM

## 2022-05-16 DIAGNOSIS — I10 ESSENTIAL HYPERTENSION: ICD-10-CM

## 2022-05-16 DIAGNOSIS — G47.33 OSA (OBSTRUCTIVE SLEEP APNEA): Chronic | ICD-10-CM

## 2022-05-16 DIAGNOSIS — R74.8 ELEVATED LIVER ENZYMES: ICD-10-CM

## 2022-05-16 DIAGNOSIS — E28.2 PCOS (POLYCYSTIC OVARIAN SYNDROME): ICD-10-CM

## 2022-05-16 DIAGNOSIS — Z85.42 HISTORY OF ENDOMETRIAL CANCER: ICD-10-CM

## 2022-05-16 DIAGNOSIS — E11.9 TYPE 2 DIABETES MELLITUS WITHOUT COMPLICATION, WITH LONG-TERM CURRENT USE OF INSULIN (HCC): Primary | ICD-10-CM

## 2022-05-16 PROCEDURE — 3017F COLORECTAL CA SCREEN DOC REV: CPT | Performed by: NURSE PRACTITIONER

## 2022-05-16 PROCEDURE — G8427 DOCREV CUR MEDS BY ELIG CLIN: HCPCS | Performed by: NURSE PRACTITIONER

## 2022-05-16 PROCEDURE — 99213 OFFICE O/P EST LOW 20 MIN: CPT | Performed by: NURSE PRACTITIONER

## 2022-05-16 PROCEDURE — 2022F DILAT RTA XM EVC RTNOPTHY: CPT | Performed by: NURSE PRACTITIONER

## 2022-05-16 PROCEDURE — 3052F HG A1C>EQUAL 8.0%<EQUAL 9.0%: CPT | Performed by: NURSE PRACTITIONER

## 2022-05-16 NOTE — PROGRESS NOTES
Medical Nutrition Therapy   Metabolic and Bariatric Surgery         Supervised diet and exercise preparation  Visit 5 out of 6  Pt reports:      Changes in eating patterns to promote health are noted below on the goals number 19-22    Vitals: Wt Readings from Last 3 Encounters:   04/22/22 (!) 361 lb (163.7 kg)   04/20/22 (!) 361 lb (163.7 kg)   02/28/22 (!) 365 lb (165.6 kg)         Nutrition Assessment:   PES: Knowledge deficit related to healthy behaviors that support weight management post weight loss surgery as evidenced by There is no height or weight on file to calculate BMI. Nutrition Assessment of Goal Attainment:  TREATMENT GOALS:    1. Pt  Completed 2 out of 4 goals. 2.TREATMENT GOALS FOR UPCOMING WEEK: continue all previous goals and add: # 0    All goals were planned with and agreed on by the patient. I want to improve my health because I want to live. appt # NA G What is your next step? C 1 2 3 4 5 6 7 8 9     0  1 I will read the education binder provided to me and the   x 100             0  2 I will make my pschological evaluation appoinment. x 100             5  3 I will bring this goal card to every appointment. 100 0 VV  vv         x 4 I will eliminate all tobacco/nicotine. x 5 I will limit alcoholic beverages to 3-6KU per week. x 6 I will limit dining out to 3 times per week or less. x 7 I will eliminate sugary beverages. x 8 I will eliminate carbonated beverages. x 9 I will eliminate drinking with a straw. x 10 I will limit caffeinated beverages to 16oz daily. 5  11 I will limit log my exercise daily. 100 0 100  0 vv        2  12 I will determine my calcium and mvi plan. x   0           4  13 I will have 1-2 servings of lean protein present at each meal and minimeal. x      100        4  14 I will eat every 3-5 hours.  x      100         x 15 I will drink 64oz of fluid daily.                 16 I will eat slowly during meals and snacks. x                17 I will limit fluids 4oz before after and during meals. x                18 I will eat protein first at all meals followed by vegetables,  Fruit and lastly whole grains. x              3  19 My first weight neutral approach is:  I will limit eating after 7pm to a minimeal only. 100 100             20 My second weight neutral approach is:                 21  My Thirds weight neutral approach is:                 22                  23                  24                  25                    Questions asked for goal understanding:                                                  Do you understand your goals? Do you have the information you need to achieve your goals? Do you have any questions  right now? []  Consistent goal achievement in the program thus far and further success with goals is expected. []  Unable to consistently make progress in goal achievement. At this time patient is not moving forward  in developing the skills needed for success after surgery. Plan:    Continue to follow monthly and review goals.          [x]  Nutrition visits complete    []

## 2022-05-17 NOTE — PROGRESS NOTES
Medical Weight Management Progress Note    TELEHEALTH EVALUATION -- Audio/Visual (During XFCWI-43 public health emergency)    Subjective      Patient being seen for medically supervised weight loss for the chronic conditions of Type 2 DM, HTN, PCOS, KIANA, Elevated Liver Enzymes, Hx Endometrial Cancer. She is working on the behavior changes discussed at the initial appointment. Patient continues on diet plan. Physical activity includes walking. Weight loss of 0 lbs since last visit. Uses CPAP. Psych eval completed and clearance received. EGD was cancelled due to dyspnea. Scheduled for echocardiogram and needs cardiac clearance before being rescheduled. Diabetes regimen includes Trulicity, metformin, and Levemir insulin. Last A1C 8.0%. No current issues. Working toward bariatric surgery:    [x] Sleeve Gastrectomy                                                           [] Najma-en-Y Gastric Bypass    Allergies: Allergies   Allergen Reactions    Latex Itching and Rash    Penicillins Anaphylaxis and Other (See Comments)     Passes out, all penicillins        Past Medical History:     Past Medical History:   Diagnosis Date    Acute left-sided low back pain with left-sided sciatica     Arthritis     BMI 70 and over, adult ([address]     Cancer ([facility] [address] [facility] 2013    endometrial    Diabetes mellitus ([facility] [address] [facility]     High blood pressure     Hypertension     Sleep apnea    .     Past Surgical History:  Past Surgical History:   Procedure Laterality Date     SECTION      HYSTERECTOMY, TOTAL ABDOMINAL      OTHER SURGICAL HISTORY      [id number] multiple procedures to remove cancer       Family History:  Family History   Problem Relation Age of Onset    Cancer Mother         thyroid    Diabetes Mother     Anemia Other     Cancer Other     Breast Cancer Other     Diabetes Other     High Blood Pressure Other     Heart Disease Other     Hypertension Father        Social History:  Social History Socioeconomic History    Marital status:      Spouse name: Not on file    Number of children: Not on file    Years of education: Not on file    Highest education level: Not on file   Occupational History    Not on file   Tobacco Use    Smoking status: Never Smoker    Smokeless tobacco: Never Used   Vaping Use    Vaping Use: Never used   Substance and Sexual Activity    Alcohol use: No    Drug use: No    Sexual activity: Not on file   Other Topics Concern    Not on file   Social History Narrative    Not on file     Social Determinants of Health     Financial Resource Strain: Low Risk     Difficulty of Paying Living Expenses: Not hard at all   Food Insecurity: No Food Insecurity    Worried About [address] in the Last Year: Never true    [employer]  [place] in the Last Year: Never true   Transportation Needs:     Lack of Transportation (Medical): Not on file    Lack of Transportation (Non-Medical):  Not on file   Physical Activity:     Days of Exercise per Week: Not on file    Minutes of Exercise per Session: Not on file   Stress:     Feeling of Stress : Not on file   Social Connections:     Frequency of Communication with Friends and Family: Not on file    Frequency of Social Gatherings with Friends and Family: Not on file    Attends [employer] Services: Not on file    Active Member of [address] [place] [address] [employer] or Organizations: Not on file    Attends Club or Organization Meetings: Not on file    Marital Status: Not on file   Intimate Partner Violence:     Fear of Current or Ex-Partner: Not on file    Emotionally Abused: Not on file    Physically Abused: Not on file    Sexually Abused: Not on file   Housing Stability:     Unable to Pay for Housing in the Last Year: Not on file    Number of [place] in the Last Year: Not on file    Unstable Housing in the Last Year: Not on file       Current Medications:  Current Outpatient Medications   Medication Sig Dispense Refill    insulin detemir (LEVEMIR FLEXTOUCH) 100 UNIT/ML injection pen Inject 35 Units into the skin 2 times daily If sugars not down increase to 40 units BID 63 mL 0    Insulin Pen Needle (B-D ULTRAFINE III SHORT PEN) 31G X 8 MM MISC Inject 1 each into the skin 2 times daily May substitute with any brand 200 each 11    Continuous Blood Gluc Sensor (FREESTYLE LUIS ANGEL 14 DAY SENSOR) MISC 1 Device by Does not apply route in the morning, at noon, and at bedtime Uncontrolled type 2 DM on insulin, shots BID 2 each 5    Continuous Blood Gluc  (FREESTYLE LUIS ANGEL 2 READER) MELQUIADES 1 Device by Does not apply route in the morning, at noon, and at bedtime Type 2 DM on insulin BID, uncontrolled DM 1 each 1    Handicap Placard MISC by Does not apply route For orthopedic disease. Expires 12/6/2026 1 each 0    Dulaglutide (TRULICITY) 1.5 FO/5.2SJ SOPN Inject 1.5 MG into the skin once a week. 4 pen 4    metFORMIN (GLUCOPHAGE) 500 MG tablet Take 1 tablet by mouth 2 times daily (with meals) 180 tablet 3    triamterene-hydroCHLOROthiazide (MAXZIDE) 75-50 MG per tablet TAKE 1 TABLET BY MOUTH EVERY DAY 90 tablet 3    lisinopril (PRINIVIL;ZESTRIL) 10 MG tablet Take 1 tablet by mouth daily TAKE 1 TABLET BY MOUTH EVERY DAY 90 tablet 2    Lancets MISC 1 each by Does not apply route 3 times daily 100 each 5    blood glucose monitor strips Test 3  times a day & as needed for symptoms of irregular blood glucose. Dispense sufficient amount for indicated testing frequency plus additional to accommodate PRN testing needs. Type 2 DM on insulin 100 strip 3    Saline (SIMPLY SALINE) 0.9 % AERS 2 sprays by Nasal route 4 times daily  0    vitamin D (ERGOCALCIFEROL) 1.25 MG (07773 UT) CAPS capsule Take 1 capsule by mouth once a week for 12 doses 12 capsule 0     No current facility-administered medications for this visit. Vital Signs: There were no vitals taken for this visit. Review of Systems - A review of systems was performed.   All was negative unless otherwise documented in HPI. Constitutional: Negative for fever, chills. HENT: Negative for hearing loss and trouble swallowing. Eyes: Negative for visual disturbance. Respiratory: Negative for cough, shortness of breath. Cardiovascular: Negative for chest pain. Gastrointestinal: Negative for nausea, vomiting, abdominal pain, diarrhea, constipation, blood in stool and abdominal distention. Endocrine: Negative for polydipsia, polyphagia and polyuria. Genitourinary: Negative for dysuria or hematuria. Musculoskeletal: Negative for myalgias, joint swelling. Skin: Negative for pallor and rash. Neurological: Negative for dizziness, light-headedness and headaches. Psychiatric/Behavioral: Negative for sleep disturbance and dysphoric mood. Objective:      Physical Exam     General: Well-developed and well-nourished. No acute distress. HEENT: Normocephalic. Pulmonary/Chest: Normal effort. Neurological:  Alert and oriented to person, place, and time. Psychiatric: Normal mood and affect. Speech and behavior normal.     Assessment:       Diagnosis Orders   1. Type 2 diabetes mellitus without complication, with long-term current use of insulin ([facility] [address]     2. Essential hypertension     3. PCOS (polycystic ovarian syndrome)     4. KIANA (obstructive sleep apnea)     5. Morbid obesity with BMI of 60.0-69.9, adult ([address]     6. History of endometrial cancer     7. Elevated liver enzymes         Plan:    Dietitian visit follow up call. Follow-up  Return in about 1 month (around 6/16/2022). Orders this encounter:  No orders of the defined types were placed in this encounter. Prescriptions this encounter:  No orders of the defined types were placed in this encounter.       Electronically signed by:  ANGELES Perry    Pursuant to the emergency declaration under the [facility] [address] waiver authority and the Coronavirus Preparedness and Response Supplemental Appropriations Act, this Virtual  Visit was conducted, with patient's consent, to reduce the patient's risk of exposure to COVID-19 and provide continuity of care for an established patient. Services were provided through a video synchronous discussion virtually to substitute for in-person clinic visit. Patient was in her home and provider was in the weight management clinic in [facility].

## 2022-05-19 ENCOUNTER — OFFICE VISIT (OUTPATIENT)
Dept: PRIMARY CARE CLINIC | Age: 57
End: 2022-05-19
Payer: COMMERCIAL

## 2022-05-19 VITALS
OXYGEN SATURATION: 99 % | HEIGHT: 64 IN | BODY MASS INDEX: 50.02 KG/M2 | SYSTOLIC BLOOD PRESSURE: 134 MMHG | DIASTOLIC BLOOD PRESSURE: 80 MMHG | HEART RATE: 102 BPM | WEIGHT: 293 LBS

## 2022-05-19 DIAGNOSIS — E11.65 UNCONTROLLED TYPE 2 DIABETES MELLITUS WITH HYPERGLYCEMIA (HCC): Primary | ICD-10-CM

## 2022-05-19 DIAGNOSIS — E11.9 TYPE 2 DIABETES MELLITUS WITHOUT COMPLICATION, WITHOUT LONG-TERM CURRENT USE OF INSULIN (HCC): ICD-10-CM

## 2022-05-19 DIAGNOSIS — G89.29 CHRONIC LOW BACK PAIN, UNSPECIFIED BACK PAIN LATERALITY, UNSPECIFIED WHETHER SCIATICA PRESENT: ICD-10-CM

## 2022-05-19 DIAGNOSIS — M54.32 LEFT SCIATIC NERVE PAIN: ICD-10-CM

## 2022-05-19 DIAGNOSIS — M54.50 CHRONIC BILATERAL LOW BACK PAIN, UNSPECIFIED WHETHER SCIATICA PRESENT: ICD-10-CM

## 2022-05-19 DIAGNOSIS — Z79.4 TYPE 2 DIABETES MELLITUS WITHOUT COMPLICATION, WITH LONG-TERM CURRENT USE OF INSULIN (HCC): ICD-10-CM

## 2022-05-19 DIAGNOSIS — E11.9 TYPE 2 DIABETES MELLITUS WITHOUT COMPLICATION, WITH LONG-TERM CURRENT USE OF INSULIN (HCC): ICD-10-CM

## 2022-05-19 DIAGNOSIS — G89.29 CHRONIC BILATERAL LOW BACK PAIN, UNSPECIFIED WHETHER SCIATICA PRESENT: ICD-10-CM

## 2022-05-19 DIAGNOSIS — M54.50 CHRONIC LOW BACK PAIN, UNSPECIFIED BACK PAIN LATERALITY, UNSPECIFIED WHETHER SCIATICA PRESENT: ICD-10-CM

## 2022-05-19 LAB — HBA1C MFR BLD: 8.5 %

## 2022-05-19 PROCEDURE — 3017F COLORECTAL CA SCREEN DOC REV: CPT | Performed by: FAMILY MEDICINE

## 2022-05-19 PROCEDURE — G8427 DOCREV CUR MEDS BY ELIG CLIN: HCPCS | Performed by: FAMILY MEDICINE

## 2022-05-19 PROCEDURE — 3052F HG A1C>EQUAL 8.0%<EQUAL 9.0%: CPT | Performed by: FAMILY MEDICINE

## 2022-05-19 PROCEDURE — 2022F DILAT RTA XM EVC RTNOPTHY: CPT | Performed by: FAMILY MEDICINE

## 2022-05-19 PROCEDURE — 99214 OFFICE O/P EST MOD 30 MIN: CPT | Performed by: FAMILY MEDICINE

## 2022-05-19 PROCEDURE — 1036F TOBACCO NON-USER: CPT | Performed by: FAMILY MEDICINE

## 2022-05-19 PROCEDURE — G8417 CALC BMI ABV UP PARAM F/U: HCPCS | Performed by: FAMILY MEDICINE

## 2022-05-19 PROCEDURE — 83036 HEMOGLOBIN GLYCOSYLATED A1C: CPT | Performed by: FAMILY MEDICINE

## 2022-05-19 RX ORDER — DULAGLUTIDE 3 MG/.5ML
3 INJECTION, SOLUTION SUBCUTANEOUS WEEKLY
Qty: 4 PEN | Refills: 5 | Status: SHIPPED | OUTPATIENT
Start: 2022-05-19

## 2022-05-19 RX ORDER — ERTUGLIFLOZIN 5 MG/1
5 TABLET, FILM COATED ORAL DAILY
Qty: 30 TABLET | Refills: 5 | Status: SHIPPED | OUTPATIENT
Start: 2022-05-19 | End: 2022-06-24 | Stop reason: ALTCHOICE

## 2022-05-19 RX ORDER — INSULIN DETEMIR 100 [IU]/ML
80 INJECTION, SOLUTION SUBCUTANEOUS NIGHTLY
Qty: 72 ML | Refills: 2 | Status: SHIPPED | OUTPATIENT
Start: 2022-05-19 | End: 2022-08-24

## 2022-05-19 RX ORDER — GLUCOSAMINE HCL/CHONDROITIN SU 500-400 MG
CAPSULE ORAL
Qty: 100 STRIP | Refills: 5 | Status: SHIPPED | OUTPATIENT
Start: 2022-05-19

## 2022-05-19 ASSESSMENT — ENCOUNTER SYMPTOMS: BACK PAIN: 1

## 2022-05-19 NOTE — PATIENT INSTRUCTIONS
Patient Education        A Healthy Lifestyle: Care Instructions  Your Care Instructions     A healthy lifestyle can help you feel good, stay at a healthy weight, and have plenty of energy for both work and play. A healthy lifestyle is something youcan share with your whole family. A healthy lifestyle also can lower your risk for serious health problems, suchas high blood pressure, heart disease, and diabetes. You can follow a few steps listed below to improve your health and the healthof your family. Follow-up care is a key part of your treatment and safety. Be sure to make and go to all appointments, and call your doctor if you are having problems. It's also a good idea to know your test results and keep alist of the medicines you take. How can you care for yourself at home?  Do not eat too much sugar, fat, or fast foods. You can still have dessert and treats now and then. The goal is moderation.  Start small to improve your eating habits. Pay attention to portion sizes, drink less juice and soda pop, and eat more fruits and vegetables. ? Eat a healthy amount of food. A 3-ounce serving of meat, for example, is about the size of a deck of cards. Fill the rest of your plate with vegetables and whole grains. ? Limit the amount of soda and sports drinks you have every day. Drink more water when you are thirsty. ? Eat plenty of fruits and vegetables every day. Have an apple or some carrot sticks as an afternoon snack instead of a candy bar. Try to have fruits and/or vegetables at every meal.   Make exercise part of your daily routine. You may want to start with simple activities, such as walking, bicycling, or slow swimming. Try to be active 30 to 60 minutes every day. You do not need to do all 30 to 60 minutes all at once. For example, you can exercise 3 times a day for 10 or 20 minutes.  Moderate exercise is safe for most people, but it is always a good idea to talk to your doctor before starting an exercise program.   Keep moving. [place] the lawn, work in the garden, or [email or web address]. Take the stairs instead of the elevator at work.  If you smoke, quit. People who smoke have an increased risk for heart attack, stroke, cancer, and other lung illnesses. Quitting is hard, but there are ways to boost your chance of quitting tobacco for good. ? Use nicotine gum, patches, or lozenges. ? Ask your doctor about stop-smoking programs and medicines. ? Keep trying. In addition to reducing your risk of diseases in the future, you will notice some benefits soon after you stop using tobacco. If you have shortness of breath or asthma symptoms, they will likely get better within a few weeks after you quit.  Limit how much alcohol you drink. Moderate amounts of alcohol (up to 2 drinks a day for men, 1 drink a day for women) are okay. But drinking too much can lead to liver problems, high blood pressure, and other health problems. Family health  If you have a family, there are many things you can do together to improve yourhealth.  Eat meals together as a family as often as possible.  Eat healthy foods. This includes fruits, vegetables, lean meats and dairy, and whole grains.  Include your family in your fitness plan. Most people think of activities such as jogging or tennis as the way to fitness, but there are many ways you and your family can be more active. Anything that makes you breathe hard and gets your heart pumping is exercise. Here are some tips:  ? Walk to do errands or to take your child to school or the bus.  ? Go for a family bike ride after dinner instead of watching TV. Where can you learn more? Go to https://AMResortslala.Outcome Referrals. org and sign in to your [employer] account. Enter I594 in the [facility] box to learn more about \"A Healthy Lifestyle: Care Instructions. \"     If you do not have an account, please click on the \"Sign Up Now\" link.   Current as of: June 16, 2021               Content Version: 13.2  © 9628-1287 Healthwise, Incorporated. Care instructions adapted under license by [facility] ([facility]). If you have questions about a medical condition or this instruction, always ask your healthcare professional. [address] any warranty or liability for your use of this information.

## 2022-05-19 NOTE — PROGRESS NOTES
[facility] PRIMARY CARE  [address] [place] [address]   Dept: [phone]    Taco Ozuna is a 55 y.o. female Established patient, who presents today for her medical conditions/complaintsas noted below. Chief Complaint   Patient presents with    Diabetes     3 month follow up        HPI:     HPI  No low sugar reactions  Sugars are high. Checking sugars 3 x a day. Insulin taking 70 units daily. Following weight watchers  Has been going to gym    She's very upset about her weight gain and her A1C going up. She wants to get the bypass now. Reviewed prior notes Bariatric surgery  Reviewed previous Labs      Back pain flaring up today, hx of back pain.    echocardiogram due to be done [facility] cardiology consultant.          LDL Cholesterol (mg/dL)   Date Value   2022 119   2022 119   2019 118     LDL Calculated (mg/dL)   Date Value   2011 89       (goal LDL is <100)   AST (U/L)   Date Value   2022 29   2022 29     ALT (U/L)   Date Value   2022 52 (H)   2022 52 (H)     BUN (mg/dL)   Date Value   2022 12   2022 12     Hemoglobin A1C (%)   Date Value   2022 8.5     TSH (mIU/L)   Date Value   2022 0.54     BP Readings from Last 3 Encounters:   22 134/80   22 125/76   22 129/60          (goal 120/80)    Past Medical History:   Diagnosis Date    Acute left-sided low back pain with left-sided sciatica     Arthritis     BMI 70 and over, adult ([address]     Cancer ([address] 2013    endometrial    Diabetes mellitus ([address]     High blood pressure     Hypertension     Sleep apnea       Past Surgical History:   Procedure Laterality Date     SECTION      HYSTERECTOMY, TOTAL ABDOMINAL      OTHER SURGICAL HISTORY      [id number] multiple procedures to remove cancer       Family History   Problem Relation Age of Onset    Cancer Mother         thyroid    Diabetes Mother     Anemia Other     Cancer Other     Breast Cancer Other     Diabetes Other     High Blood Pressure Other     Heart Disease Other     Hypertension Father        Social History     Tobacco Use    Smoking status: Never Smoker    Smokeless tobacco: Never Used   Substance Use Topics    Alcohol use: No      Current Outpatient Medications   Medication Sig Dispense Refill    blood glucose monitor strips Test three times a day & as needed for symptoms of irregular blood glucose. Dispense sufficient amount for indicated testing frequency plus additional to accommodate PRN testing needs. Type 2 DM on insulin shots, uncontrolled. 100 strip 5    Dulaglutide (TRULICITY) 3 YD/8.5DI SOPN Inject 3 mg into the skin once a week 4 pen 5    insulin detemir (LEVEMIR FLEXTOUCH) 100 UNIT/ML injection pen Inject 80 Units into the skin nightly If sugars not down increase to 40 units BID 72 mL 2    Ertugliflozin L-PyroglutamicAc (STEGLATRO) 5 MG TABS Take 5 mg by mouth daily 30 tablet 5    Insulin Pen Needle (B-D ULTRAFINE III SHORT PEN) 31G X 8 MM MISC Inject 1 each into the skin 2 times daily May substitute with any brand 200 each 11    Continuous Blood Gluc Sensor (FREESTYLE LUIS ANGEL 14 DAY SENSOR) MISC 1 Device by Does not apply route in the morning, at noon, and at bedtime Uncontrolled type 2 DM on insulin, shots BID 2 each 5    Continuous Blood Gluc  (FREESTYLE LUIS ANGEL 2 READER) MELQUIADES 1 Device by Does not apply route in the morning, at noon, and at bedtime Type 2 DM on insulin BID, uncontrolled DM 1 each 1    Handicap Placard MISC by Does not apply route For orthopedic disease.   Expires 12/6/2026 1 each 0    metFORMIN (GLUCOPHAGE) 500 MG tablet Take 1 tablet by mouth 2 times daily (with meals) 180 tablet 3    triamterene-hydroCHLOROthiazide (MAXZIDE) 75-50 MG per tablet TAKE 1 TABLET BY MOUTH EVERY DAY 90 tablet 3    lisinopril (PRINIVIL;ZESTRIL) 10 MG tablet Take 1 tablet by mouth daily TAKE 1 TABLET BY MOUTH EVERY DAY 90 tablet 2    Lancets MISC 1 each by Does not apply route 3 times daily 100 each 5    blood glucose monitor strips Test 3  times a day & as needed for symptoms of irregular blood glucose. Dispense sufficient amount for indicated testing frequency plus additional to accommodate PRN testing needs. Type 2 DM on insulin 100 strip 3    Saline (SIMPLY SALINE) 0.9 % AERS 2 sprays by Nasal route 4 times daily  0    vitamin D (ERGOCALCIFEROL) 1.25 MG (28021 UT) CAPS capsule Take 1 capsule by mouth once a week for 12 doses 12 capsule 0     No current facility-administered medications for this visit. Allergies   Allergen Reactions    Latex Itching and Rash    Penicillins Anaphylaxis and Other (See Comments)     Passes out, all penicillins        Health Maintenance   Topic Date Due    HIV screen  Never done    Hepatitis C screen  Never done    Hepatitis B vaccine (1 of 3 - Risk 3-dose series) Never done    Colorectal Cancer Screen  Never done    Breast cancer screen  Never done    Shingles vaccine (1 of 2) Never done    Diabetic foot exam  01/22/2021    Diabetic microalbuminuria test  01/22/2021    Diabetic retinal exam  08/13/2022    Pneumococcal 0-64 years Vaccine (2 - PCV) 09/25/2022    Depression Screen  12/30/2022    Lipids  02/21/2023    A1C test (Diabetic or Prediabetic)  05/19/2023    DTaP/Tdap/Td vaccine (2 - Td or Tdap) 10/27/2027    Flu vaccine  Completed    COVID-19 Vaccine  Completed    Hepatitis A vaccine  Aged Out    Hib vaccine  Aged Out    Meningococcal (ACWY) vaccine  Aged Out       Subjective:      Review of Systems   Musculoskeletal: Positive for back pain. Lower back pain radiating down the left leg.   She states it becomes sudden with a severe spasm and the pain shoots down she has to sit down immediately       Objective:     /80   Pulse 102   Ht 5' 4\" (1.626 m)   Wt (!) 366 lb (166 kg)   SpO2 99%   BMI 62.82 kg/m²   Physical Exam  Vitals and nursing note reviewed. Constitutional:       General: She is not in acute distress. Appearance: She is well-developed. She is obese. She is not ill-appearing. HENT:      Head: Normocephalic and atraumatic. Right Ear: External ear normal.      Left Ear: External ear normal.   Eyes:      General: No scleral icterus. Right eye: No discharge. Left eye: No discharge. Conjunctiva/sclera: Conjunctivae normal.   Neck:      Thyroid: No thyromegaly. Trachea: No tracheal deviation. Cardiovascular:      Rate and Rhythm: Normal rate and regular rhythm. Heart sounds: Normal heart sounds. Pulmonary:      Effort: Pulmonary effort is normal. No respiratory distress. Breath sounds: Normal breath sounds. No wheezing. Musculoskeletal:      Comments: Lymphedema lower legs   Lymphadenopathy:      Cervical: No cervical adenopathy. Skin:     General: Skin is warm. Findings: No rash. Neurological:      Mental Status: She is alert and oriented to person, place, and time. Psychiatric:         Mood and Affect: Mood normal.         Behavior: Behavior normal.         Thought Content: Thought content normal.         Assessment:       Diagnosis Orders   1. Uncontrolled type 2 diabetes mellitus with hyperglycemia ([facility])  POCT glycosylated hemoglobin (Hb A1C)    Microalbumin, Ur   2. Type 2 diabetes mellitus without complication, without long-term current use of insulin ([facility])  POCT glycosylated hemoglobin (Hb A1C)    Microalbumin, Ur   3. Type 2 diabetes mellitus without complication, with long-term current use of insulin ([facility])  insulin detemir (LEVEMIR FLEXTOUCH) 100 UNIT/ML injection pen   4. Chronic low back pain, unspecified back pain laterality, unspecified whether sciatica present  [facility] - [place]    XR LUMBAR SPINE (MIN 4 VIEWS)   5.  Chronic bilateral low back pain, unspecified whether sciatica present  [facility] - [place] XR LUMBAR SPINE (MIN 4 VIEWS)   6. Left sciatic nerve pain  XR LUMBAR SPINE (MIN 4 VIEWS)        Plan:      Return in about 3 months (around 8/19/2022) for diabetes mellitus. Come back sooner if physical therapy is not helping her back. Check  lumbar x-ray. Increase Trulicity, increase insulin. Start Steglatro  Check sugars tid due to uncontrolled sugars  Orders Placed This Encounter   Procedures    XR LUMBAR SPINE (MIN 4 VIEWS)     Standing Status:   Future     Standing Expiration Date:   5/19/2023     Order Specific Question:   Reason for exam:     Answer:   pain    Microalbumin, Ur     Standing Status:   Future     Standing Expiration Date:   5/19/2023   [facility] [place]     Referral Priority:   Routine     Referral Type:   Eval and Treat     Referral Reason:   Specialty Services Required     Requested Specialty:   Physical Therapist     Number of Visits Requested:   1    POCT glycosylated hemoglobin (Hb A1C)     Orders Placed This Encounter   Medications    blood glucose monitor strips     Sig: Test three times a day & as needed for symptoms of irregular blood glucose. Dispense sufficient amount for indicated testing frequency plus additional to accommodate PRN testing needs. Type 2 DM on insulin shots, uncontrolled. Dispense:  100 strip     Refill:  5     Brand per patient preference. May round up to next available package size.  Dulaglutide (TRULICITY) 3 LT/0.4QB SOPN     Sig: Inject 3 mg into the skin once a week     Dispense:  4 pen     Refill:  5    insulin detemir (LEVEMIR FLEXTOUCH) 100 UNIT/ML injection pen     Sig: Inject 80 Units into the skin nightly If sugars not down increase to 40 units BID     Dispense:  72 mL     Refill:  2    Ertugliflozin L-PyroglutamicAc (STEGLATRO) 5 MG TABS     Sig: Take 5 mg by mouth daily     Dispense:  30 tablet     Refill:  5       Patient given educationalmaterials - see patient instructions.   Discussed use, benefit, and side effectsof prescribed medications. All patient questions answered. Pt voiced understanding. Reviewed health maintenance. Instructed to continue current medications, diet andexercise. Patient agreed with treatment plan. Follow up as directed.      Electronicallysigned by Katelin Martino MD on 5/19/2022 at 8:38 AM

## 2022-05-23 DIAGNOSIS — E11.9 TYPE 2 DIABETES MELLITUS WITHOUT COMPLICATION, WITH LONG-TERM CURRENT USE OF INSULIN (HCC): ICD-10-CM

## 2022-05-23 DIAGNOSIS — Z79.4 TYPE 2 DIABETES MELLITUS WITHOUT COMPLICATION, WITH LONG-TERM CURRENT USE OF INSULIN (HCC): ICD-10-CM

## 2022-05-23 RX ORDER — LISINOPRIL 10 MG/1
10 TABLET ORAL DAILY
Qty: 90 TABLET | Refills: 2 | Status: SHIPPED | OUTPATIENT
Start: 2022-05-23

## 2022-06-07 ENCOUNTER — TELEPHONE (OUTPATIENT)
Dept: PRIMARY CARE CLINIC | Age: 57
End: 2022-06-07

## 2022-06-07 NOTE — TELEPHONE ENCOUNTER
Can you addend 05/19 office note to state she is testing her sugar 3 times daily.      Fax to [facility] [phone]

## 2022-06-20 ENCOUNTER — TELEPHONE (OUTPATIENT)
Dept: PULMONOLOGY | Age: 57
End: 2022-06-20

## 2022-06-20 DIAGNOSIS — G47.33 OSA (OBSTRUCTIVE SLEEP APNEA): Primary | ICD-10-CM

## 2022-06-21 ENCOUNTER — PATIENT MESSAGE (OUTPATIENT)
Dept: PRIMARY CARE CLINIC | Age: 57
End: 2022-06-21

## 2022-06-22 RX ORDER — GENTAMICIN SULFATE 3 MG/ML
1 SOLUTION/ DROPS OPHTHALMIC 4 TIMES DAILY
Qty: 1 EACH | Refills: 0 | Status: SHIPPED | OUTPATIENT
Start: 2022-06-22 | End: 2022-06-27

## 2022-06-24 ENCOUNTER — OFFICE VISIT (OUTPATIENT)
Dept: BARIATRICS/WEIGHT MGMT | Age: 57
End: 2022-06-24
Payer: COMMERCIAL

## 2022-06-24 VITALS
BODY MASS INDEX: 50.02 KG/M2 | DIASTOLIC BLOOD PRESSURE: 78 MMHG | RESPIRATION RATE: 20 BRPM | HEART RATE: 76 BPM | WEIGHT: 293 LBS | SYSTOLIC BLOOD PRESSURE: 126 MMHG | HEIGHT: 64 IN

## 2022-06-24 DIAGNOSIS — E66.01 MORBID OBESITY WITH BMI OF 60.0-69.9, ADULT (HCC): ICD-10-CM

## 2022-06-24 DIAGNOSIS — R74.8 ELEVATED LIVER ENZYMES: ICD-10-CM

## 2022-06-24 DIAGNOSIS — E28.2 PCOS (POLYCYSTIC OVARIAN SYNDROME): ICD-10-CM

## 2022-06-24 DIAGNOSIS — E11.9 TYPE 2 DIABETES MELLITUS WITHOUT COMPLICATION, WITH LONG-TERM CURRENT USE OF INSULIN (HCC): ICD-10-CM

## 2022-06-24 DIAGNOSIS — Z79.4 TYPE 2 DIABETES MELLITUS WITHOUT COMPLICATION, WITH LONG-TERM CURRENT USE OF INSULIN (HCC): ICD-10-CM

## 2022-06-24 DIAGNOSIS — Z85.42 HISTORY OF ENDOMETRIAL CANCER: ICD-10-CM

## 2022-06-24 DIAGNOSIS — G47.33 OSA (OBSTRUCTIVE SLEEP APNEA): Primary | Chronic | ICD-10-CM

## 2022-06-24 PROCEDURE — 3017F COLORECTAL CA SCREEN DOC REV: CPT | Performed by: NURSE PRACTITIONER

## 2022-06-24 PROCEDURE — 3052F HG A1C>EQUAL 8.0%<EQUAL 9.0%: CPT | Performed by: NURSE PRACTITIONER

## 2022-06-24 PROCEDURE — G8427 DOCREV CUR MEDS BY ELIG CLIN: HCPCS | Performed by: NURSE PRACTITIONER

## 2022-06-24 PROCEDURE — 99213 OFFICE O/P EST LOW 20 MIN: CPT | Performed by: NURSE PRACTITIONER

## 2022-06-24 PROCEDURE — 1036F TOBACCO NON-USER: CPT | Performed by: NURSE PRACTITIONER

## 2022-06-24 PROCEDURE — 2022F DILAT RTA XM EVC RTNOPTHY: CPT | Performed by: NURSE PRACTITIONER

## 2022-06-24 PROCEDURE — G8417 CALC BMI ABV UP PARAM F/U: HCPCS | Performed by: NURSE PRACTITIONER

## 2022-06-24 NOTE — PROGRESS NOTES
Medical Nutrition Therapy   Metabolic and Bariatric Surgery         Supervised diet and exercise preparation  Visit 6 out of 6    Changes in eating patterns to promote health are noted below on the goals number 19-22    Vitals: Wt Readings from Last 3 Encounters:   06/24/22 (!) 364 lb (165.1 kg)   05/19/22 (!) 366 lb (166 kg)   04/22/22 (!) 361 lb (163.7 kg)         Nutrition Assessment:   PES: Knowledge deficit related to healthy behaviors that support weight management post weight loss surgery as evidenced by Body mass index is 62.48 kg/m². Nutrition Assessment of Goal Attainment:  TREATMENT GOALS:    1. Pt  Completed 2 out of 4 goals. 2.TREATMENT GOALS FOR UPCOMING WEEK: continue all previous goals and add: # 0    All goals were planned with and agreed on by the patient. I want to improve my health because I want to live. appt # NA G What is your next step? C 1 2 3 4 5 6 7 8 9     0  1 I will read the education binder provided to me and the   x 100             0  2 I will make my pschological evaluation appoinment. x 100             5  3 I will bring this goal card to every appointment. x 100 0 VV  0         x 4 I will eliminate all tobacco/nicotine. x 5 I will limit alcoholic beverages to 9-2WM per week. x 6 I will limit dining out to 3 times per week or less. x 7 I will eliminate sugary beverages. x 8 I will eliminate carbonated beverages. x 9 I will eliminate drinking with a straw. x 10 I will limit caffeinated beverages to 16oz daily. 5  11 I will limit log my exercise daily. x 100 0 100  0 0        2  12 I will determine my calcium and mvi plan. x   0           4  13 I will have 1-2 servings of lean protein present at each meal and minimeal. x      100        4  14 I will eat every 3-5 hours. x      100         x 15 I will drink 64oz of fluid daily.                  16 I will eat slowly during meals and snacks. x                17 I will limit fluids 4oz before after and during meals. x                18 I will eat protein first at all meals followed by vegetables,  Fruit and lastly whole grains. x              3  19 My first weight neutral approach is:  I will limit eating after 7pm to a minimeal only. 100 100             20 My second weight neutral approach is:                 21  My Thirds weight neutral approach is:                 22                  23                  24                  25                    Questions asked for goal understanding:                                                  Do you understand your goals? Do you have the information you need to achieve your goals? Do you have any questions  right now? [x]  Consistent goal achievement in the program thus far and further success with goals is expected. []  Unable to consistently make progress in goal achievement. At this time patient is not moving forward  in developing the skills needed for success after surgery. Plan:    Continue to follow monthly and review goals.          []  Nutrition visits complete    [x]

## 2022-06-24 NOTE — PROGRESS NOTES
Medical Weight Management Progress Note    Subjective     Patient being seen for medically supervised weight loss for the chronic conditions of Type 2 DM, HTN, PCOS, KIANA, Elevated Liver Enzymes, Hx Endometrial Cancer. She is working on the behavior changes discussed at the initial appointment. Patient continues on diet plan. Physical activity includes walking. Weight gain of 3 lbs since last visit. Uses CPAP. Psych eval completed and clearance received. EGD needs to be scheduled after cardiac clearance. Diabetes regimen includes Trulicity, metformin, and Levemir insulin. Last A1C 8.5%. No current issues.     Working toward bariatric surgery:    [x]? Sleeve Gastrectomy                                                           []? Najma-en-Y Gastric Bypass  Allergies: Allergies   Allergen Reactions    Latex Itching and Rash    Penicillins Anaphylaxis and Other (See Comments)     Passes out, all penicillins        Past Medical History:     Past Medical History:   Diagnosis Date    Acute left-sided low back pain with left-sided sciatica     Arthritis     BMI 70 and over, adult ([facility]     Cancer ([facility]     endometrial    Diabetes mellitus ([facility]     High blood pressure     Hypertension     Sleep apnea    .     Past Surgical History:  Past Surgical History:   Procedure Laterality Date     SECTION      HYSTERECTOMY, TOTAL ABDOMINAL (CERVIX REMOVED)      OTHER SURGICAL HISTORY      [id number] multiple procedures to remove cancer       Family History:  Family History   Problem Relation Age of Onset    Cancer Mother         thyroid    Diabetes Mother     Anemia Other     Cancer Other     Breast Cancer Other     Diabetes Other     High Blood Pressure Other     Heart Disease Other     Hypertension Father        Social History:  Social History     Socioeconomic History    Marital status:      Spouse name: Not on file    Number of children: Not on file    Years of education: Not on file    Highest education level: Not on file   Occupational History    Not on file   Tobacco Use    Smoking status: Never Smoker    Smokeless tobacco: Never Used   Vaping Use    Vaping Use: Never used   Substance and Sexual Activity    Alcohol use: No    Drug use: No    Sexual activity: Not on file   Other Topics Concern    Not on file   Social History Narrative    Not on file     Social Determinants of Health     Financial Resource Strain: Low Risk     Difficulty of Paying Living Expenses: Not hard at all   Food Insecurity: No Food Insecurity    Worried About [facility] in the Last Year: Never true    [facility] in the Last Year: Never true   Transportation Needs:     Lack of Transportation (Medical): Not on file    Lack of Transportation (Non-Medical):  Not on file   Physical Activity:     Days of Exercise per Week: Not on file    Minutes of Exercise per Session: Not on file   Stress:     Feeling of Stress : Not on file   Social Connections:     Frequency of Communication with Friends and Family: Not on file    Frequency of Social Gatherings with Friends and Family: Not on file    Attends [employer] Services: Not on file    Active Member of [address] [place] [address] or Organizations: Not on file    Attends Club or Organization Meetings: Not on file    Marital Status: Not on file   Intimate Partner Violence:     Fear of Current or Ex-Partner: Not on file    Emotionally Abused: Not on file    Physically Abused: Not on file    Sexually Abused: Not on file   Housing Stability:     Unable to Pay for Housing in the Last Year: Not on file    Number of [place] in the Last Year: Not on file    Unstable Housing in the Last Year: Not on file       Current Medications:  Current Outpatient Medications   Medication Sig Dispense Refill    metoprolol tartrate (LOPRESSOR) 25 MG tablet TAKE 1 TABLET BY MOUTH TWICE A DAY      gentamicin (GARAMYCIN) 0.3 % ophthalmic solution Place 1 drop into both eyes 4 times daily for 5 days 1 each 0    lisinopril (PRINIVIL;ZESTRIL) 10 MG tablet Take 1 tablet by mouth daily TAKE 1 TABLET BY MOUTH EVERY DAY 90 tablet 2    blood glucose monitor strips Test three times a day & as needed for symptoms of irregular blood glucose. Dispense sufficient amount for indicated testing frequency plus additional to accommodate PRN testing needs. Type 2 DM on insulin shots, uncontrolled. 100 strip 5    Dulaglutide (TRULICITY) 3 DO/4.0ZE SOPN Inject 3 mg into the skin once a week 4 pen 5    insulin detemir (LEVEMIR FLEXTOUCH) 100 UNIT/ML injection pen Inject 80 Units into the skin nightly If sugars not down increase to 40 units BID 72 mL 2    Insulin Pen Needle (B-D ULTRAFINE III SHORT PEN) 31G X 8 MM MISC Inject 1 each into the skin 2 times daily May substitute with any brand 200 each 11    Continuous Blood Gluc Sensor (FREESTYLE LUIS ANGEL 14 DAY SENSOR) MISC 1 Device by Does not apply route in the morning, at noon, and at bedtime Uncontrolled type 2 DM on insulin, shots BID 2 each 5    Continuous Blood Gluc  (FREESTYLE LUIS ANGEL 2 READER) MELQUIADES 1 Device by Does not apply route in the morning, at noon, and at bedtime Type 2 DM on insulin BID, uncontrolled DM 1 each 1    Handicap Placard MISC by Does not apply route For orthopedic disease. Expires 12/6/2026 1 each 0    metFORMIN (GLUCOPHAGE) 500 MG tablet Take 1 tablet by mouth 2 times daily (with meals) 180 tablet 3    triamterene-hydroCHLOROthiazide (MAXZIDE) 75-50 MG per tablet TAKE 1 TABLET BY MOUTH EVERY DAY 90 tablet 3    Lancets MISC 1 each by Does not apply route 3 times daily 100 each 5    blood glucose monitor strips Test 3  times a day & as needed for symptoms of irregular blood glucose. Dispense sufficient amount for indicated testing frequency plus additional to accommodate PRN testing needs.  Type 2 DM on insulin 100 strip 3    Saline (SIMPLY SALINE) 0.9 % AERS 2 sprays by Nasal route 4 times daily  0    vitamin D (ERGOCALCIFEROL) 1.25 MG (53896 UT) CAPS capsule Take 1 capsule by mouth once a week for 12 doses 12 capsule 0     No current facility-administered medications for this visit. Vital Signs:  /78 (Site: Right Upper Arm, Position: Sitting, Cuff Size: Large Adult)   Pulse 76   Resp 20   Ht 5' 4\" (1.626 m)   Wt (!) 364 lb (165.1 kg)   BMI 62.48 kg/m²     BMI/Height/Weight:  Body mass index is 62.48 kg/m². Review of Systems - A review of systems was performed. All was negative unless otherwise documented in HPI. Constitutional: Negative for fever, chills and diaphoresis. HENT: Negative for hearing loss and trouble swallowing. Eyes: Negative for photophobia and visual disturbance. Respiratory: Negative for cough, shortness of breath and wheezing. Cardiovascular: Negative for chest pain and palpitations. Gastrointestinal: Negative for nausea, vomiting, abdominal pain, diarrhea, constipation, blood in stool and abdominal distention. Endocrine: Negative for polydipsia, polyphagia and polyuria. Genitourinary: Negative for dysuria, frequency, hematuria and difficulty urinating. Musculoskeletal: Negative for myalgias, joint swelling. Skin: Negative for pallor and rash. Neurological: Negative for dizziness, tremors, light-headedness and headaches. Psychiatric/Behavioral: Negative for sleep disturbance and dysphoric mood. Objective:      Physical Exam   Vital signs reviewed. General: Well-developed and well-nourished. No acute distress. Skin: Warm, dry and intact. HEENT: Normocephalic. EOMs intact. Conjunctivae normal. Neck supple. Cardiovascular: Normal rate, regular rhythm. Pulmonary/Chest: Normal effort. Lungs clear to auscultation. No rales, rhonchi or wheezing. Abdominal: Positive bowel sounds. Soft, nontender. Nondistended. Musculoskeletal: Movement x4. Bilateral lower extremity edema. Neurological: Gait normal. Alert and oriented to person, place, and time. Psychiatric: Normal mood and affect. Speech and behavior normal. Judgment and thought content normal. Cognition and memory intact. Assessment:       Diagnosis Orders   1. KIANA (obstructive sleep apnea)     2. Type 2 diabetes mellitus without complication, with long-term current use of insulin ([facility]     3. Morbid obesity with BMI of 60.0-69.9, adult ([facility]     4. PCOS (polycystic ovarian syndrome)     5. History of endometrial cancer     6. Elevated liver enzymes         Plan:    Dietitian visit today. Patient was encouraged to journal all food intake. Keep calorie level at approximately 6300-7896. Protein intake is to be a minimum of 60-80 grams per day. Water drinking was encouraged with a goal of 64oz-128oz daily. Beverages to be calorie free except for milk. Every other beverage should be water. Avoid soda. Continue to increase level of physical activity. Encouraged use of exercise log. Last A1C 8.5%. Her provider has increased her insulin and Trulicity doses. Follow-up  Return in about 1 month (around 7/24/2022). Orders this encounter:  No orders of the defined types were placed in this encounter. Prescriptions this encounter:  No orders of the defined types were placed in this encounter.       Electronically signed by:  Leticia Verma CNP

## 2022-07-18 ENCOUNTER — PATIENT MESSAGE (OUTPATIENT)
Dept: PRIMARY CARE CLINIC | Age: 57
End: 2022-07-18

## 2022-07-18 ENCOUNTER — PATIENT MESSAGE (OUTPATIENT)
Dept: BARIATRICS/WEIGHT MGMT | Age: 57
End: 2022-07-18

## 2022-07-19 NOTE — TELEPHONE ENCOUNTER
From: Joe Cheney  To: Huyen Winston  Sent: 7/18/2022 4:59 PM EDT  Subject: Covid-19    Hi,     My  tested positive for Covid and we have to quarantine. Can I reschedule my upcoming appointment or please do it remote.     Thank you,  Angella Cox

## 2022-07-21 ENCOUNTER — PATIENT MESSAGE (OUTPATIENT)
Dept: PRIMARY CARE CLINIC | Age: 57
End: 2022-07-21

## 2022-07-21 DIAGNOSIS — U07.1 COVID: Primary | ICD-10-CM

## 2022-07-22 NOTE — TELEPHONE ENCOUNTER
From: Bernice Zuñiga  To: Dr. Devika Caal: 7/21/2022 8:36 PM EDT  Subject: Covid-19    Hi,    I tested positive on binax at home test. We have been quarantining since Monday when my  tested positive. I do have symptoms. Should I take anything? If so, what? Also, the heart DrChris Prescribed Metoprolol several weeks ago in addition to my other medications.     Thank you,  Marty Harris

## 2022-07-25 ENCOUNTER — TELEMEDICINE (OUTPATIENT)
Dept: BARIATRICS/WEIGHT MGMT | Age: 57
End: 2022-07-25
Payer: COMMERCIAL

## 2022-07-25 DIAGNOSIS — R74.8 ELEVATED LIVER ENZYMES: ICD-10-CM

## 2022-07-25 DIAGNOSIS — I10 ESSENTIAL HYPERTENSION: ICD-10-CM

## 2022-07-25 DIAGNOSIS — G47.33 OSA (OBSTRUCTIVE SLEEP APNEA): Chronic | ICD-10-CM

## 2022-07-25 DIAGNOSIS — E66.01 MORBID OBESITY WITH BMI OF 60.0-69.9, ADULT (HCC): ICD-10-CM

## 2022-07-25 DIAGNOSIS — Z79.4 TYPE 2 DIABETES MELLITUS WITHOUT COMPLICATION, WITH LONG-TERM CURRENT USE OF INSULIN (HCC): Primary | ICD-10-CM

## 2022-07-25 DIAGNOSIS — Z85.42 HISTORY OF ENDOMETRIAL CANCER: ICD-10-CM

## 2022-07-25 DIAGNOSIS — E11.9 TYPE 2 DIABETES MELLITUS WITHOUT COMPLICATION, WITH LONG-TERM CURRENT USE OF INSULIN (HCC): Primary | ICD-10-CM

## 2022-07-25 DIAGNOSIS — E28.2 PCOS (POLYCYSTIC OVARIAN SYNDROME): ICD-10-CM

## 2022-07-25 DIAGNOSIS — E55.9 VITAMIN D DEFICIENCY: ICD-10-CM

## 2022-07-25 PROCEDURE — 99213 OFFICE O/P EST LOW 20 MIN: CPT | Performed by: NURSE PRACTITIONER

## 2022-07-25 PROCEDURE — 3052F HG A1C>EQUAL 8.0%<EQUAL 9.0%: CPT | Performed by: NURSE PRACTITIONER

## 2022-07-25 PROCEDURE — 2022F DILAT RTA XM EVC RTNOPTHY: CPT | Performed by: NURSE PRACTITIONER

## 2022-07-25 PROCEDURE — G8427 DOCREV CUR MEDS BY ELIG CLIN: HCPCS | Performed by: NURSE PRACTITIONER

## 2022-07-25 PROCEDURE — 3017F COLORECTAL CA SCREEN DOC REV: CPT | Performed by: NURSE PRACTITIONER

## 2022-07-25 RX ORDER — ERGOCALCIFEROL 1.25 MG/1
50000 CAPSULE ORAL WEEKLY
Qty: 8 CAPSULE | Refills: 0 | Status: SHIPPED | OUTPATIENT
Start: 2022-07-25 | End: 2022-08-19

## 2022-07-25 NOTE — PROGRESS NOTES
all meals followed by vegetables,  Fruit and lastly whole grains. x              3  19 My first weight neutral approach is:  I will limit eating after 7pm to a minimeal only. 100 100             20 My second weight neutral approach is:                 21  My Thirds weight neutral approach is:                 22                  23                  24                  25                                                                         Continue to follow monthly and review goals.          [x]  Nutrition goals complete    [x]

## 2022-07-25 NOTE — PROGRESS NOTES
Medical Weight Management Progress Note    TELEHEALTH EVALUATION -- Audio/Visual (During XBRCD-32 public health emergency)    Subjective      Patient being seen for medically supervised weight loss for the chronic conditions of Type 2 DM, HTN, PCOS, KIANA, Elevated Liver Enzymes, Hx Endometrial Cancer. She is working on the behavior changes discussed at the initial appointment. Patient continues on diet plan. Physical activity limited due to currently having COVID. Weight loss of 2 lbs since last visit. Uses CPAP. Psych eval completed and clearance received. EGD was cancelled due to dyspnea. Needs cardiac clearance before being rescheduled. Had echo and needs stress test.  Diabetes regimen includes Trulicity, metformin, and Levemir insulin. Last A1C 8.0%. No current issues. Working toward bariatric surgery:    [x] Sleeve Gastrectomy                                                           [] Najma-en-Y Gastric Bypass    Allergies: Allergies   Allergen Reactions    Latex Itching and Rash    Penicillins Anaphylaxis and Other (See Comments)     Passes out, all penicillins        Past Medical History:     Past Medical History:   Diagnosis Date    Acute left-sided low back pain with left-sided sciatica     Arthritis     BMI 70 and over, adult ([address]     Cancer ([facility] [facility]     endometrial    Diabetes mellitus ([facility]     High blood pressure     Hypertension     Sleep apnea    .     Past Surgical History:  Past Surgical History:   Procedure Laterality Date     SECTION      HYSTERECTOMY, TOTAL ABDOMINAL (CERVIX REMOVED)      OTHER SURGICAL HISTORY      2685-2510 multiple procedures to remove cancer       Family History:  Family History   Problem Relation Age of Onset    Cancer Mother         thyroid    Diabetes Mother     Anemia Other     Cancer Other     Breast Cancer Other     Diabetes Other     High Blood Pressure Other     Heart Disease Other     Hypertension Father        Social History:  Social History     Socioeconomic History    Marital status:      Spouse name: Not on file    Number of children: Not on file    Years of education: Not on file    Highest education level: Not on file   Occupational History    Not on file   Tobacco Use    Smoking status: Never    Smokeless tobacco: Never   Vaping Use    Vaping Use: Never used   Substance and Sexual Activity    Alcohol use: No    Drug use: No    Sexual activity: Not on file   Other Topics Concern    Not on file   Social History Narrative    Not on file     Social Determinants of Health     Financial Resource Strain: Low Risk     Difficulty of Paying Living Expenses: Not hard at all   Food Insecurity: No Food Insecurity    Worried About Running Out of Food in the Last Year: Never true    Ran Out of Food in the Last Year: Never true   Transportation Needs: Not on file   Physical Activity: Not on file   Stress: Not on file   Social Connections: Not on file   Intimate Partner Violence: Not on file   Housing Stability: Not on file       Current Medications:  Current Outpatient Medications   Medication Sig Dispense Refill    vitamin D (ERGOCALCIFEROL) 1.25 MG (84248 UT) CAPS capsule Take 1 capsule by mouth once a week for 8 doses 8 capsule 0    metoprolol tartrate (LOPRESSOR) 25 MG tablet TAKE 1 TABLET BY MOUTH TWICE A DAY      lisinopril (PRINIVIL;ZESTRIL) 10 MG tablet Take 1 tablet by mouth daily TAKE 1 TABLET BY MOUTH EVERY DAY 90 tablet 2    blood glucose monitor strips Test three times a day & as needed for symptoms of irregular blood glucose. Dispense sufficient amount for indicated testing frequency plus additional to accommodate PRN testing needs. Type 2 DM on insulin shots, uncontrolled.  100 strip 5    Dulaglutide (TRULICITY) 3 WP/7.9GY SOPN Inject 3 mg into the skin once a week 4 pen 5    insulin detemir (LEVEMIR FLEXTOUCH) 100 UNIT/ML injection pen Inject 80 Units into the skin nightly If sugars not down increase to 40 units BID 72 mL 2 Insulin Pen Needle (B-D ULTRAFINE III SHORT PEN) 31G X 8 MM MISC Inject 1 each into the skin 2 times daily May substitute with any brand 200 each 11    Continuous Blood Gluc Sensor (FREESTYLE LUIS ANGEL 14 DAY SENSOR) MISC 1 Device by Does not apply route in the morning, at noon, and at bedtime Uncontrolled type 2 DM on insulin, shots BID 2 each 5    Continuous Blood Gluc  (FREESTYLE LUIS ANGEL 2 READER) MELQUIADES 1 Device by Does not apply route in the morning, at noon, and at bedtime Type 2 DM on insulin BID, uncontrolled DM 1 each 1    Handicap Placard MISC by Does not apply route For orthopedic disease. Expires 12/6/2026 1 each 0    metFORMIN (GLUCOPHAGE) 500 MG tablet Take 1 tablet by mouth 2 times daily (with meals) 180 tablet 3    triamterene-hydroCHLOROthiazide (MAXZIDE) 75-50 MG per tablet TAKE 1 TABLET BY MOUTH EVERY DAY 90 tablet 3    Lancets MISC 1 each by Does not apply route 3 times daily 100 each 5    blood glucose monitor strips Test 3  times a day & as needed for symptoms of irregular blood glucose. Dispense sufficient amount for indicated testing frequency plus additional to accommodate PRN testing needs. Type 2 DM on insulin 100 strip 3    Saline (SIMPLY SALINE) 0.9 % AERS 2 sprays by Nasal route 4 times daily  0    nirmatrelvir/ritonavir (PAXLOVID) 20 x 150 MG & 10 x 100MG Take 3 tablets (two 150 mg nirmatrelvir and one 100 mg ritonavir tablets) by mouth every 12 hours for 5 days. 30 tablet 0     No current facility-administered medications for this visit. Vital Signs: There were no vitals taken for this visit. Review of Systems - A review of systems was performed. All was negative unless otherwise documented in HPI. Constitutional: Negative for fever, chills. HENT: Negative for hearing loss and trouble swallowing. Eyes: Negative for visual disturbance. Respiratory: Negative for shortness of breath. Positive for cough. Cardiovascular: Negative for chest pain.    Gastrointestinal: Negative for nausea, vomiting, abdominal pain, diarrhea, constipation, blood in stool and abdominal distention. Endocrine: Negative for polydipsia, polyphagia and polyuria. Genitourinary: Negative for dysuria or hematuria. Musculoskeletal: Negative for myalgias, joint swelling. Skin: Negative for pallor and rash. Neurological: Negative for dizziness, light-headedness and headaches. Psychiatric/Behavioral: Negative for sleep disturbance and dysphoric mood. Objective:      Physical Exam     General: Well-developed and well-nourished. No acute distress. HEENT: Normocephalic. Pulmonary/Chest: Normal effort. Neurological:  Alert and oriented to person, place, and time. Psychiatric: Normal mood and affect. Speech and behavior normal.     Assessment:       Diagnosis Orders   1. Type 2 diabetes mellitus without complication, with long-term current use of insulin ([facility]        2. Essential hypertension        3. Morbid obesity with BMI of 60.0-69.9, adult ([facility]        4. KIANA (obstructive sleep apnea)        5. PCOS (polycystic ovarian syndrome)        6. History of endometrial cancer        7. Elevated liver enzymes        8. Vitamin D deficiency  vitamin D (ERGOCALCIFEROL) 1.25 MG (78986 UT) CAPS capsule          Plan:    Dietitian visit follow up call. Follow-up  Return in about 1 month (around 8/25/2022). Orders this encounter:  No orders of the defined types were placed in this encounter. Prescriptions this encounter:  Orders Placed This Encounter   Medications    vitamin D (ERGOCALCIFEROL) 1.25 MG (81067 UT) CAPS capsule     Sig: Take 1 capsule by mouth once a week for 8 doses     Dispense:  8 capsule     Refill:  0       Electronically signed by:  ANGELES Moyer, was evaluated through a synchronous (real-time) audio-video encounter. The patient (or guardian if applicable) is aware that this is a billable service, which includes applicable co-pays.  This Virtual Visit was conducted with patient's (and/or legal guardian's) consent. The visit was conducted pursuant to the emergency declaration under the [address] [place] [address] and the [employer] General Act. Patient identification was verified, and a caregiver was present when appropriate. The patient was located in a state where the provider was licensed to provide care.

## 2022-07-27 ENCOUNTER — TELEPHONE (OUTPATIENT)
Dept: PRIMARY CARE CLINIC | Age: 57
End: 2022-07-27

## 2022-08-19 DIAGNOSIS — E55.9 VITAMIN D DEFICIENCY: ICD-10-CM

## 2022-08-19 RX ORDER — ERGOCALCIFEROL 1.25 MG/1
CAPSULE ORAL
Qty: 4 CAPSULE | Refills: 1 | Status: SHIPPED | OUTPATIENT
Start: 2022-08-19 | End: 2022-09-19

## 2022-08-24 ENCOUNTER — OFFICE VISIT (OUTPATIENT)
Dept: BARIATRICS/WEIGHT MGMT | Age: 57
End: 2022-08-24
Payer: COMMERCIAL

## 2022-08-24 VITALS
DIASTOLIC BLOOD PRESSURE: 83 MMHG | BODY MASS INDEX: 50.02 KG/M2 | HEIGHT: 64 IN | WEIGHT: 293 LBS | SYSTOLIC BLOOD PRESSURE: 139 MMHG | HEART RATE: 109 BPM

## 2022-08-24 DIAGNOSIS — E11.9 TYPE 2 DIABETES MELLITUS WITHOUT COMPLICATION, WITH LONG-TERM CURRENT USE OF INSULIN (HCC): ICD-10-CM

## 2022-08-24 DIAGNOSIS — Z79.4 TYPE 2 DIABETES MELLITUS WITHOUT COMPLICATION, WITH LONG-TERM CURRENT USE OF INSULIN (HCC): ICD-10-CM

## 2022-08-24 DIAGNOSIS — E28.2 PCOS (POLYCYSTIC OVARIAN SYNDROME): ICD-10-CM

## 2022-08-24 DIAGNOSIS — E66.01 MORBID OBESITY WITH BMI OF 60.0-69.9, ADULT (HCC): ICD-10-CM

## 2022-08-24 DIAGNOSIS — G47.33 OSA (OBSTRUCTIVE SLEEP APNEA): Primary | Chronic | ICD-10-CM

## 2022-08-24 DIAGNOSIS — Z85.42 HISTORY OF ENDOMETRIAL CANCER: ICD-10-CM

## 2022-08-24 DIAGNOSIS — I10 ESSENTIAL HYPERTENSION: ICD-10-CM

## 2022-08-24 PROCEDURE — G8427 DOCREV CUR MEDS BY ELIG CLIN: HCPCS | Performed by: NURSE PRACTITIONER

## 2022-08-24 PROCEDURE — 2022F DILAT RTA XM EVC RTNOPTHY: CPT | Performed by: NURSE PRACTITIONER

## 2022-08-24 PROCEDURE — 1036F TOBACCO NON-USER: CPT | Performed by: NURSE PRACTITIONER

## 2022-08-24 PROCEDURE — 3052F HG A1C>EQUAL 8.0%<EQUAL 9.0%: CPT | Performed by: NURSE PRACTITIONER

## 2022-08-24 PROCEDURE — 3017F COLORECTAL CA SCREEN DOC REV: CPT | Performed by: NURSE PRACTITIONER

## 2022-08-24 PROCEDURE — 99213 OFFICE O/P EST LOW 20 MIN: CPT | Performed by: NURSE PRACTITIONER

## 2022-08-24 PROCEDURE — G8417 CALC BMI ABV UP PARAM F/U: HCPCS | Performed by: NURSE PRACTITIONER

## 2022-08-24 NOTE — PROGRESS NOTES
Medical Weight Management Progress Note    Subjective     Patient being seen for medically supervised weight loss for the chronic conditions of Type 2 DM, HTN, PCOS, KIANA, Elevated Liver Enzymes, Hx Endometrial Cancer. She is working on the behavior changes discussed at the initial appointment. Patient continues on diet plan. Physical activity includes walking. Weight loss of 7 lbs since last visit. Uses CPAP. Psych eval completed and clearance received. EGD was cancelled due to dyspnea. Needs cardiac clearance before being rescheduled. Diabetes regimen includes Trulicity, metformin, and Levemir insulin. Last A1C 8.0%. No current issues. Working toward bariatric surgery:    [x] Sleeve Gastrectomy                                                           [] Najma-en-Y Gastric Bypass  Allergies: Allergies   Allergen Reactions    Latex Itching and Rash    Penicillins Anaphylaxis and Other (See Comments)     Passes out, all penicillins        Past Medical History:     Past Medical History:   Diagnosis Date    Acute left-sided low back pain with left-sided sciatica     Arthritis     BMI 70 and over, adult ([address]     Cancer ([facility] [address]     endometrial    Diabetes mellitus ([facility] [address]     High blood pressure     Hypertension     Sleep apnea    .     Past Surgical History:  Past Surgical History:   Procedure Laterality Date     SECTION      HYSTERECTOMY, TOTAL ABDOMINAL (CERVIX REMOVED)      OTHER SURGICAL HISTORY      [id number] multiple procedures to remove cancer       Family History:  Family History   Problem Relation Age of Onset    Cancer Mother         thyroid    Diabetes Mother     Anemia Other     Cancer Other     Breast Cancer Other     Diabetes Other     High Blood Pressure Other     Heart Disease Other     Hypertension Father        Social History:  Social History     Socioeconomic History    Marital status:      Spouse name: Not on file    Number of children: Not on file    Years of Patient needs a new prescription for Basaglar to replace Lantus. He also requested a prescription for Humalog, but Novolog is preferred. Please advise if it is ok to switch to Novolog. education: Not on file    Highest education level: Not on file   Occupational History    Not on file   Tobacco Use    Smoking status: Never    Smokeless tobacco: Never   Vaping Use    Vaping Use: Never used   Substance and Sexual Activity    Alcohol use: No    Drug use: No    Sexual activity: Not on file   Other Topics Concern    Not on file   Social History Narrative    Not on file     Social Determinants of Health     Financial Resource Strain: Low Risk     Difficulty of Paying Living Expenses: Not hard at all   Food Insecurity: No Food Insecurity    Worried About Running Out of Food in the Last Year: Never true    Ran Out of Food in the Last Year: Never true   Transportation Needs: Not on file   Physical Activity: Not on file   Stress: Not on file   Social Connections: Not on file   Intimate Partner Violence: Not on file   Housing Stability: Not on file       Current Medications:  Current Outpatient Medications   Medication Sig Dispense Refill    vitamin D (ERGOCALCIFEROL) 1.25 MG (79836 UT) CAPS capsule TAKE 1 CAPSULE BY MOUTH ONE TIME PER WEEK FOR 8 DOSES 4 capsule 1    metoprolol tartrate (LOPRESSOR) 25 MG tablet TAKE 1 TABLET BY MOUTH TWICE A DAY      lisinopril (PRINIVIL;ZESTRIL) 10 MG tablet Take 1 tablet by mouth daily TAKE 1 TABLET BY MOUTH EVERY DAY 90 tablet 2    blood glucose monitor strips Test three times a day & as needed for symptoms of irregular blood glucose. Dispense sufficient amount for indicated testing frequency plus additional to accommodate PRN testing needs. Type 2 DM on insulin shots, uncontrolled.  100 strip 5    Dulaglutide (TRULICITY) 3 KY/2.5MB SOPN Inject 3 mg into the skin once a week 4 pen 5    insulin detemir (LEVEMIR FLEXTOUCH) 100 UNIT/ML injection pen Inject 80 Units into the skin nightly If sugars not down increase to 40 units BID 72 mL 2    Insulin Pen Needle (B-D ULTRAFINE III SHORT PEN) 31G X 8 MM MISC Inject 1 each into the skin 2 times daily May substitute with any brand 200 each 11    Continuous Blood Gluc Sensor (FREESTYLE LUIS ANGEL 14 DAY SENSOR) MISC 1 Device by Does not apply route in the morning, at noon, and at bedtime Uncontrolled type 2 DM on insulin, shots BID 2 each 5    Continuous Blood Gluc  (FREESTYLE LUIS ANGEL 2 READER) MELQUIADES 1 Device by Does not apply route in the morning, at noon, and at bedtime Type 2 DM on insulin BID, uncontrolled DM 1 each 1    Handicap Placard MISC by Does not apply route For orthopedic disease. Expires 12/6/2026 1 each 0    metFORMIN (GLUCOPHAGE) 500 MG tablet Take 1 tablet by mouth 2 times daily (with meals) 180 tablet 3    triamterene-hydroCHLOROthiazide (MAXZIDE) 75-50 MG per tablet TAKE 1 TABLET BY MOUTH EVERY DAY 90 tablet 3    Lancets MISC 1 each by Does not apply route 3 times daily 100 each 5    blood glucose monitor strips Test 3  times a day & as needed for symptoms of irregular blood glucose. Dispense sufficient amount for indicated testing frequency plus additional to accommodate PRN testing needs. Type 2 DM on insulin 100 strip 3    Saline (SIMPLY SALINE) 0.9 % AERS 2 sprays by Nasal route 4 times daily  0     No current facility-administered medications for this visit. Vital Signs:  /83 (Site: Right Upper Arm, Position: Sitting, Cuff Size: Large Adult)   Pulse (!) 109   Ht 5' 4\" (1.626 m)   Wt (!) 357 lb (161.9 kg)   BMI 61.28 kg/m²     BMI/Height/Weight:  Body mass index is 61.28 kg/m². Review of Systems - A review of systems was performed. All was negative unless otherwise documented in HPI. Constitutional: Negative for fever, chills and diaphoresis. HENT: Negative for hearing loss and trouble swallowing. Eyes: Negative for photophobia and visual disturbance. Respiratory: Negative for cough, shortness of breath and wheezing. Cardiovascular: Negative for chest pain and palpitations.    Gastrointestinal: Negative for nausea, vomiting, abdominal pain, diarrhea, constipation, blood in stool and abdominal distention. Endocrine: Negative for polydipsia, polyphagia and polyuria. Genitourinary: Negative for dysuria, frequency, hematuria and difficulty urinating. Musculoskeletal: Negative for myalgias, joint swelling. Skin: Negative for pallor and rash. Neurological: Negative for dizziness, tremors, light-headedness and headaches. Psychiatric/Behavioral: Negative for sleep disturbance and dysphoric mood. Objective:      Physical Exam   Vital signs reviewed. General: Well-developed and well-nourished. No acute distress. Skin: Warm, dry and intact. HEENT: Normocephalic. EOMs intact. Conjunctivae normal. Neck supple. Cardiovascular: Normal rate, regular rhythm. Pulmonary/Chest: Normal effort. Lungs clear to auscultation. No rales, rhonchi or wheezing. Abdominal: Positive bowel sounds. Soft, nontender. Nondistended. Musculoskeletal: Movement x4. Bilateral lower extremity edema. Neurological: Gait normal. Alert and oriented to person, place, and time. Psychiatric: Normal mood and affect. Speech and behavior normal. Judgment and thought content normal. Cognition and memory intact. Assessment:       Diagnosis Orders   1. KIANA (obstructive sleep apnea)        2. Type 2 diabetes mellitus without complication, with long-term current use of insulin ([facility] [address]        3. Morbid obesity with BMI of 60.0-69.9, adult ([address]        4. Essential hypertension        5. PCOS (polycystic ovarian syndrome)        6. History of endometrial cancer            Plan:    Dietitian visit today. Patient was encouraged to journal all food intake. Keep calorie level at approximately 7540-2721. Protein intake is to be a minimum of 60-80 grams per day. Water drinking was encouraged with a goal of 64oz-128oz daily. Beverages to be calorie free except for milk. Every other beverage should be water. Avoid soda. Continue to increase level of physical activity. Encouraged use of exercise log.     Follow-up  Return in about 1 month (around 9/24/2022). Orders this encounter:  No orders of the defined types were placed in this encounter. Prescriptions this encounter:  No orders of the defined types were placed in this encounter.       Electronically signed by:  Sam Mccall CNP

## 2022-08-24 NOTE — PROGRESS NOTES
Medical Nutrition Therapy   Metabolic and Bariatric Surgery         Supervised diet and exercise preparation  Pt has completed nutrition goals    Vitals: Wt Readings from Last 3 Encounters:   08/24/22 (!) 357 lb (161.9 kg)   06/24/22 (!) 364 lb (165.1 kg)   05/19/22 (!) 366 lb (166 kg)         Nutrition Assessment:   PES: Knowledge deficit related to healthy behaviors that support weight management post weight loss surgery as evidenced by Body mass index is 61.28 kg/m². Nutrition Assessment of Goal Attainment:  TREATMENT GOALS:           want to improve my health because I want to live. appt # NA G What is your next step? C 1 2 3 4 5 6 7 8 9     0  1 I will read the education binder provided to me and the   x 100             0  2 I will make my pschological evaluation appoinment. x 100             5  3 I will bring this goal card to every appointment. x 100 0 VV  0         x 4 I will eliminate all tobacco/nicotine. x 5 I will limit alcoholic beverages to 3-2OD per week. x 6 I will limit dining out to 3 times per week or less. x 7 I will eliminate sugary beverages. x 8 I will eliminate carbonated beverages. x 9 I will eliminate drinking with a straw. x 10 I will limit caffeinated beverages to 16oz daily. 5  11 I will limit log my exercise daily. x 100 0 100  0 0        2  12 I will determine my calcium and mvi plan. x   0           4  13 I will have 1-2 servings of lean protein present at each meal and minimeal. x      100        4  14 I will eat every 3-5 hours. x      100         x 15 I will drink 64oz of fluid daily. 16 I will eat slowly during meals and snacks. x                17 I will limit fluids 4oz before after and during meals. x                18 I will eat protein first at all meals followed by vegetables,  Fruit and lastly whole grains.  x              3  19 My first weight neutral approach is:  I will limit eating after 7pm to a minimeal only. 100 100             20 My second weight neutral approach is:                 21  My Thirds weight neutral approach is:                 22                  23                  24                                                                         Continue to follow monthly and review goals.          [x]  Nutrition goals complete    [x]

## 2022-08-29 DIAGNOSIS — Z79.4 TYPE 2 DIABETES MELLITUS WITHOUT COMPLICATION, WITH LONG-TERM CURRENT USE OF INSULIN (HCC): ICD-10-CM

## 2022-08-29 DIAGNOSIS — E11.9 TYPE 2 DIABETES MELLITUS WITHOUT COMPLICATION, WITH LONG-TERM CURRENT USE OF INSULIN (HCC): ICD-10-CM

## 2022-09-12 ENCOUNTER — TELEMEDICINE (OUTPATIENT)
Dept: PULMONOLOGY | Age: 57
End: 2022-09-12
Payer: COMMERCIAL

## 2022-09-12 DIAGNOSIS — U07.1 COVID-19 VIRUS INFECTION: ICD-10-CM

## 2022-09-12 DIAGNOSIS — E66.01 CLASS 3 SEVERE OBESITY DUE TO EXCESS CALORIES WITH SERIOUS COMORBIDITY AND BODY MASS INDEX (BMI) OF 60.0 TO 69.9 IN ADULT (HCC): ICD-10-CM

## 2022-09-12 DIAGNOSIS — G47.33 OSA TREATED WITH BIPAP: Primary | ICD-10-CM

## 2022-09-12 PROCEDURE — 1036F TOBACCO NON-USER: CPT | Performed by: INTERNAL MEDICINE

## 2022-09-12 PROCEDURE — G8427 DOCREV CUR MEDS BY ELIG CLIN: HCPCS | Performed by: INTERNAL MEDICINE

## 2022-09-12 PROCEDURE — 99213 OFFICE O/P EST LOW 20 MIN: CPT | Performed by: INTERNAL MEDICINE

## 2022-09-12 PROCEDURE — 3017F COLORECTAL CA SCREEN DOC REV: CPT | Performed by: INTERNAL MEDICINE

## 2022-09-12 PROCEDURE — G8417 CALC BMI ABV UP PARAM F/U: HCPCS | Performed by: INTERNAL MEDICINE

## 2022-09-12 ASSESSMENT — ENCOUNTER SYMPTOMS
GASTROINTESTINAL NEGATIVE: 1
SHORTNESS OF BREATH: 1
EYES NEGATIVE: 1

## 2022-09-12 NOTE — PROGRESS NOTES
2022    TELEHEALTH EVALUATION -- Audio/Visual (During GRRIF-84 public health emergency)    Patient and physician are located in their individual locations. This is visit is completed via [email or web address] application [x]/ Doxy. me[] / Telephone []     HPI:    Fernie Lal (:  1965) has requested an audio/video evaluation for the following concern(s):    Patient returns for follow up of sleep apnea. Pt has been compliant with BiPAP and uses it every night for the entire night. Denies snoring, disturbed sleep or excessive daytime sleepiness. Having no particular difficulty with the apparatus. Compliance download not available. Needs new mask, tubing, and supplies. Had COVID infection a few months ago. Seems to have recovered completely. Mild disease. Patient is bariatric evaluation appears to be on hold. States that she has to get cardiac clearance first.  Scheduled for Persantine stress test in about a month. Expresses anxiety over this study. Apparently her mother  due to an anaphylactic reaction from radiographic contrast media. I reassured the patient that percent and is not related to radiocontrast dye. Patient has no history of asthma. Review of Systems   Constitutional: Negative. HENT: Negative. Eyes: Negative. Respiratory:  Positive for shortness of breath. Cardiovascular: Negative. Gastrointestinal: Negative. Musculoskeletal:  Positive for gait problem. All other systems reviewed and are negative. Prior to Visit Medications    Medication Sig Taking?  Authorizing Provider   metFORMIN (GLUCOPHAGE) 500 MG tablet TAKE 1 TABLET BY MOUTH TWICE A DAY WITH MEALS Yes Yusra Downey MD   vitamin D (ERGOCALCIFEROL) 1.25 MG (65127 UT) CAPS capsule TAKE 1 CAPSULE BY MOUTH ONE TIME PER WEEK FOR 8 DOSES Yes GIOVANI Gillespie - CNP   metoprolol tartrate (LOPRESSOR) 25 MG tablet TAKE 1 TABLET BY MOUTH TWICE A DAY Yes Historical Provider, MD   lisinopril (PRINIVIL;ZESTRIL) 10 MG tablet Take 1 tablet by mouth daily TAKE 1 TABLET BY MOUTH EVERY DAY Yes Sasha Villalta MD   blood glucose monitor strips Test three times a day & as needed for symptoms of irregular blood glucose. Dispense sufficient amount for indicated testing frequency plus additional to accommodate PRN testing needs. Type 2 DM on insulin shots, uncontrolled. Yes Sasha Villalta MD   Dulaglutide (TRULICITY) 3 VJ/6.3AB SOPN Inject 3 mg into the skin once a week Yes Sasha Villalta MD   Insulin Pen Needle (B-D ULTRAFINE III SHORT PEN) 31G X 8 MM MISC Inject 1 each into the skin 2 times daily May substitute with any brand Yes Sasha Villalta MD   Continuous Blood Gluc Sensor (FREESTYLE LUIS ANGEL 14 DAY SENSOR) MISC 1 Device by Does not apply route in the morning, at noon, and at bedtime Uncontrolled type 2 DM on insulin, shots BID Yes Sasha Villalta MD   Continuous Blood Gluc  (FREESTYLE LUIS ANGEL 2 READER) MELQUIADES 1 Device by Does not apply route in the morning, at noon, and at bedtime Type 2 DM on insulin BID, uncontrolled DM Yes Sasha Villalta MD   Handicap Placard MISC by Does not apply route For orthopedic disease. Expires 12/6/2026 Yes Sasha Villalta MD   triamterene-hydroCHLOROthiazide (MAXZIDE) 75-50 MG per tablet TAKE 1 TABLET BY MOUTH EVERY DAY Yes Sasha Villalta MD   Lancets MISC 1 each by Does not apply route 3 times daily Yes Sasha Villalta MD   blood glucose monitor strips Test 3  times a day & as needed for symptoms of irregular blood glucose. Dispense sufficient amount for indicated testing frequency plus additional to accommodate PRN testing needs.  Type 2 DM on insulin Yes Sasha Villalta MD   Saline (SIMPLY SALINE) 0.9 % AERS 2 sprays by Nasal route 4 times daily Yes Sasha Villalta MD   insulin detemir (LEVEMIR FLEXTOUCH) 100 UNIT/ML injection pen Inject 80 Units into the skin nightly If sugars not down increase to 40 units BID  Sasha Villalta MD       Social History Tobacco Use    Smoking status: Never    Smokeless tobacco: Never   Vaping Use    Vaping Use: Never used   Substance Use Topics    Alcohol use: No    Drug use: No            RECORD REVIEW: Previous medical records were reviewed at today's visit. Wt Readings from Last 3 Encounters:   08/24/22 (!) 357 lb (161.9 kg)   06/24/22 (!) 364 lb (165.1 kg)   05/19/22 (!) 366 lb (166 kg)       Results for orders placed or performed in visit on 05/19/22   POCT glycosylated hemoglobin (Hb A1C)   Result Value Ref Range    Hemoglobin A1C 8.5 %       No results found. PHYSICAL EXAMINATION:  Due to this being a TeleHealth encounter, evaluation of the following organ systems is limited: Vitals/Constitutional/EENT/Resp/CV/GI//MS/Neuro/Skin/Heme-Lymph-Imm. Constitutional: [x] Appears well-developed and well-nourished. [x] Abnormal obese. Mental status  [x] Alert and awake  [x] Oriented to person/place/time [x]Able to follow commands    [x] No apparent distress      Eyes:  EOM    [x]  Normal  [] Abnormal-  Sclera  [x]  Normal  [] Abnormal -         Discharge [x]  None visible  [] Abnormal -    HENT:   [x] Normocephalic, atraumatic. [] Abnormal shaped head   [x] Mouth/Throat: Mucous membranes are moist.     Ears [x] Normal  [] Abnormal-    Neck: [x] Normal range of motion [x] Supple [x] No visualized mass. Pulmonary/Chest: [x] Respiratory effort normal.  [x] No visualized signs of difficulty breathing or respiratory distress        [] Abnormal      Musculoskeletal:   [x] Normal range of motion. [x] Normal gait with no signs of ataxia. [x]  No signs of cyanosis of the peripheral portions of extremities.          [] Abnormal       Neurological:        [x] Normal cranial nerve (limited exam to video visit) [x] No focal weakness observed       [] Abnormal          Speech       [x] Normal   [] Abnormal     Skin:        [x] No rash on visible skin  [x] Normal  [] Abnormal     Psychiatric:       [x] Normal  [] Abnormal        [x] Normal Mood  [] Anxious appearing        Other Pertinent Exam Findings:       ASSESSMENT:  1. KIANA treated with BiPAP    2. Class 3 severe obesity due to excess calories with serious comorbidity and body mass index (BMI) of 60.0 to 69.9 in adult ([facility] [address]    3. COVID-19 virus infection          Plan:   Encourage BiPAP use   Avoid sedative hypnotics and alcohol at bedtime  Weight loss  Exercise caution when driving and other high risk activities of not adequately treated for sleep apnea  Instructed to bring BiPAP machine for use post op  New BiPAP mask, tubing, and supplies. Return in 1 year. An  electronic signature was used to authenticate this note. --Bibiana Stokes DO on 9/12/2022 at 5:46 PM    9}    Pursuant to the emergency declaration under the [facility], [facility] waiver authority and the [employer] Act, this Virtual  Visit was conducted, with patient's consent, to reduce the patient's risk of exposure to COVID-19 and provide continuity of care for an established patient. Services were provided through a video synchronous discussion virtually to substitute for in-person clinic visit.

## 2022-09-13 NOTE — PATIENT INSTRUCTIONS
9/13/22 - Faxed DME order from 9/12/22 and Dr Anthony Atkinson dictation to [facility] at [phone]. - SUZANNE

## 2022-09-16 ENCOUNTER — TELEMEDICINE (OUTPATIENT)
Dept: BARIATRICS/WEIGHT MGMT | Age: 57
End: 2022-09-16
Payer: COMMERCIAL

## 2022-09-16 DIAGNOSIS — I10 ESSENTIAL HYPERTENSION: ICD-10-CM

## 2022-09-16 DIAGNOSIS — Z79.4 TYPE 2 DIABETES MELLITUS WITHOUT COMPLICATION, WITH LONG-TERM CURRENT USE OF INSULIN (HCC): Primary | ICD-10-CM

## 2022-09-16 DIAGNOSIS — E28.2 PCOS (POLYCYSTIC OVARIAN SYNDROME): ICD-10-CM

## 2022-09-16 DIAGNOSIS — R74.8 ELEVATED LIVER ENZYMES: ICD-10-CM

## 2022-09-16 DIAGNOSIS — G47.33 OSA (OBSTRUCTIVE SLEEP APNEA): Chronic | ICD-10-CM

## 2022-09-16 DIAGNOSIS — Z85.42 HISTORY OF ENDOMETRIAL CANCER: ICD-10-CM

## 2022-09-16 DIAGNOSIS — E11.9 TYPE 2 DIABETES MELLITUS WITHOUT COMPLICATION, WITH LONG-TERM CURRENT USE OF INSULIN (HCC): Primary | ICD-10-CM

## 2022-09-16 DIAGNOSIS — E66.01 MORBID OBESITY WITH BMI OF 60.0-69.9, ADULT (HCC): ICD-10-CM

## 2022-09-16 PROCEDURE — 3052F HG A1C>EQUAL 8.0%<EQUAL 9.0%: CPT | Performed by: NURSE PRACTITIONER

## 2022-09-16 PROCEDURE — G8427 DOCREV CUR MEDS BY ELIG CLIN: HCPCS | Performed by: NURSE PRACTITIONER

## 2022-09-16 PROCEDURE — 2022F DILAT RTA XM EVC RTNOPTHY: CPT | Performed by: NURSE PRACTITIONER

## 2022-09-16 PROCEDURE — 99213 OFFICE O/P EST LOW 20 MIN: CPT | Performed by: NURSE PRACTITIONER

## 2022-09-16 PROCEDURE — 3017F COLORECTAL CA SCREEN DOC REV: CPT | Performed by: NURSE PRACTITIONER

## 2022-09-16 NOTE — PROGRESS NOTES
Medical Nutrition Therapy   Metabolic and Bariatric Surgery         Supervised diet and exercise preparation  Pt has completed nutrition goals      What additional goals would you like to plan today? None. Vitals: Wt Readings from Last 3 Encounters:   08/24/22 (!) 357 lb (161.9 kg)   06/24/22 (!) 364 lb (165.1 kg)   05/19/22 (!) 366 lb (166 kg)         Nutrition Assessment:   PES: Knowledge deficit related to healthy behaviors that support weight management post weight loss surgery as evidenced by There is no height or weight on file to calculate BMI. Nutrition Assessment of Goal Attainment:  TREATMENT GOALS:           want to improve my health because I want to live. appt # NA G What is your next step? C 1 2 3 4 5 6 7 8 9     0  1 I will read the education binder provided to me and the   x 100             0  2 I will make my pschological evaluation appoinment. x 100             5  3 I will bring this goal card to every appointment. x 100 0 VV  0         x 4 I will eliminate all tobacco/nicotine. x 5 I will limit alcoholic beverages to 7-4LX per week. x 6 I will limit dining out to 3 times per week or less. x 7 I will eliminate sugary beverages. x 8 I will eliminate carbonated beverages. x 9 I will eliminate drinking with a straw. x 10 I will limit caffeinated beverages to 16oz daily. 5  11 I will limit log my exercise daily. x 100 0 100  0 0        2  12 I will determine my calcium and mvi plan. x   0           4  13 I will have 1-2 servings of lean protein present at each meal and minimeal. x      100        4  14 I will eat every 3-5 hours. x      100         x 15 I will drink 64oz of fluid daily. 16 I will eat slowly during meals and snacks. x                17 I will limit fluids 4oz before after and during meals.  x                18 I will eat protein first at all meals followed by vegetables,  Fruit and lastly whole grains. x              3  19 My first weight neutral approach is:  I will limit eating after 7pm to a minimeal only. x  100 100             20 My second weight neutral approach is:                 21  My Thirds weight neutral approach is:                 22                  23                  24                                                                         Continue to follow monthly and review goals.          [x]  Nutrition goals complete    [x]

## 2022-09-16 NOTE — PROGRESS NOTES
Medical Weight Management Progress Note    TELEHEALTH EVALUATION -- Audio/Visual (During - public health emergency)    Subjective      Patient being seen for medically supervised weight loss for the chronic conditions of Type 2 DM, HTN, PCOS, KIANA, Elevated Liver Enzymes, Hx Endometrial Cancer. She is working on the behavior changes discussed at the initial appointment. Patient continues on diet plan. Physical activity includes walking. Weight loss of 8 lbs since last visit. Uses BiPAP. Psych eval completed and clearance received. EGD was cancelled due to dyspnea. Needs cardiac clearance before being rescheduled. Pulmonary clearance received. Diabetes regimen includes Trulicity, metformin, and Levemir insulin. Last A1C 8.5%. Patient states blood sugars running in 120's. No current issues. Working toward bariatric surgery:    [x] Sleeve Gastrectomy                                                           [] Najma-en-Y Gastric Bypass    Allergies: Allergies   Allergen Reactions    Latex Itching and Rash    Penicillins Anaphylaxis and Other (See Comments)     Passes out, all penicillins        Past Medical History:     Past Medical History:   Diagnosis Date    Acute left-sided low back pain with left-sided sciatica     Arthritis     BMI 70 and over, adult ([address]     Cancer ([facility] 2013    endometrial    Diabetes mellitus ([facility]     High blood pressure     Hypertension     Sleep apnea    .     Past Surgical History:  Past Surgical History:   Procedure Laterality Date     SECTION      HYSTERECTOMY, TOTAL ABDOMINAL (CERVIX REMOVED)      OTHER SURGICAL HISTORY      5600-3399 multiple procedures to remove cancer       Family History:  Family History   Problem Relation Age of Onset    Cancer Mother         thyroid    Diabetes Mother     Anemia Other     Cancer Other     Breast Cancer Other     Diabetes Other     High Blood Pressure Other     Heart Disease Other     Hypertension Father Social History:  Social History     Socioeconomic History    Marital status:      Spouse name: Not on file    Number of children: Not on file    Years of education: Not on file    Highest education level: Not on file   Occupational History    Not on file   Tobacco Use    Smoking status: Never    Smokeless tobacco: Never   Vaping Use    Vaping Use: Never used   Substance and Sexual Activity    Alcohol use: No    Drug use: No    Sexual activity: Not on file   Other Topics Concern    Not on file   Social History Narrative    Not on file     Social Determinants of Health     Financial Resource Strain: Low Risk     Difficulty of Paying Living Expenses: Not hard at all   Food Insecurity: No Food Insecurity    Worried About Running Out of Food in the Last Year: Never true    Ran Out of Food in the Last Year: Never true   Transportation Needs: Not on file   Physical Activity: Not on file   Stress: Not on file   Social Connections: Not on file   Intimate Partner Violence: Not on file   Housing Stability: Not on file       Current Medications:  Current Outpatient Medications   Medication Sig Dispense Refill    metFORMIN (GLUCOPHAGE) 500 MG tablet TAKE 1 TABLET BY MOUTH TWICE A DAY WITH MEALS 180 tablet 1    vitamin D (ERGOCALCIFEROL) 1.25 MG (76336 UT) CAPS capsule TAKE 1 CAPSULE BY MOUTH ONE TIME PER WEEK FOR 8 DOSES 4 capsule 1    metoprolol tartrate (LOPRESSOR) 25 MG tablet TAKE 1 TABLET BY MOUTH TWICE A DAY      lisinopril (PRINIVIL;ZESTRIL) 10 MG tablet Take 1 tablet by mouth daily TAKE 1 TABLET BY MOUTH EVERY DAY 90 tablet 2    blood glucose monitor strips Test three times a day & as needed for symptoms of irregular blood glucose. Dispense sufficient amount for indicated testing frequency plus additional to accommodate PRN testing needs. Type 2 DM on insulin shots, uncontrolled.  100 strip 5    Dulaglutide (TRULICITY) 3 UC/4.6QV SOPN Inject 3 mg into the skin once a week 4 pen 5    insulin detemir (LEVEMIR FLEXTOUCH) 100 UNIT/ML injection pen Inject 80 Units into the skin nightly If sugars not down increase to 40 units BID 72 mL 2    Insulin Pen Needle (B-D ULTRAFINE III SHORT PEN) 31G X 8 MM MISC Inject 1 each into the skin 2 times daily May substitute with any brand 200 each 11    Continuous Blood Gluc Sensor (FREESTYLE LUIS ANGEL 14 DAY SENSOR) MISC 1 Device by Does not apply route in the morning, at noon, and at bedtime Uncontrolled type 2 DM on insulin, shots BID 2 each 5    Continuous Blood Gluc  (FREESTYLE LUIS ANGEL 2 READER) MELQUIADES 1 Device by Does not apply route in the morning, at noon, and at bedtime Type 2 DM on insulin BID, uncontrolled DM 1 each 1    Handicap Placard MISC by Does not apply route For orthopedic disease. Expires 12/6/2026 1 each 0    triamterene-hydroCHLOROthiazide (MAXZIDE) 75-50 MG per tablet TAKE 1 TABLET BY MOUTH EVERY DAY 90 tablet 3    Lancets MISC 1 each by Does not apply route 3 times daily 100 each 5    blood glucose monitor strips Test 3  times a day & as needed for symptoms of irregular blood glucose. Dispense sufficient amount for indicated testing frequency plus additional to accommodate PRN testing needs. Type 2 DM on insulin 100 strip 3    Saline (SIMPLY SALINE) 0.9 % AERS 2 sprays by Nasal route 4 times daily  0     No current facility-administered medications for this visit. Vital Signs: There were no vitals taken for this visit. Review of Systems - A review of systems was performed. All was negative unless otherwise documented in HPI. Constitutional: Negative for fever, chills. HENT: Negative for hearing loss and trouble swallowing. Eyes: Negative for visual disturbance. Respiratory: Negative for shortness of breath. Positive for cough. Cardiovascular: Negative for chest pain. Gastrointestinal: Negative for nausea, vomiting, abdominal pain, diarrhea, constipation, blood in stool and abdominal distention.    Endocrine: Negative for polydipsia, polyphagia and polyuria. Genitourinary: Negative for dysuria or hematuria. Musculoskeletal: Negative for myalgias, joint swelling. Skin: Negative for pallor and rash. Neurological: Negative for dizziness, light-headedness and headaches. Psychiatric/Behavioral: Negative for sleep disturbance and dysphoric mood. Objective:      Physical Exam     General: Well-developed and well-nourished. No acute distress. HEENT: Normocephalic. Pulmonary/Chest: Normal effort. Neurological:  Alert and oriented to person, place, and time. Psychiatric: Normal mood and affect. Speech and behavior normal.     Assessment:       Diagnosis Orders   1. Type 2 diabetes mellitus without complication, with long-term current use of insulin ([facility]        2. Essential hypertension        3. Morbid obesity with BMI of 60.0-69.9, adult ([address]        4. KIANA (obstructive sleep apnea)        5. PCOS (polycystic ovarian syndrome)        6. History of endometrial cancer        7. Elevated liver enzymes            Plan:    Dietitian visit follow up call. Follow-up  Return in about 1 month (around 10/16/2022). Orders this encounter:  No orders of the defined types were placed in this encounter. Prescriptions this encounter:  No orders of the defined types were placed in this encounter. Electronically signed by:  ANGELES Orellana, was evaluated through a synchronous (real-time) audio-video encounter. The patient (or guardian if applicable) is aware that this is a billable service, which includes applicable co-pays. This Virtual Visit was conducted with patient's (and/or legal guardian's) consent. The visit was conducted pursuant to the emergency declaration under the [facility], [address] [place] [address] wa[facility] authority and the [employer] and [employer] General Act.  Patient identification was verified, and a caregiver was present when appropriate. The patient was located in a state where the provider was licensed to provide care.

## 2022-09-18 DIAGNOSIS — E55.9 VITAMIN D DEFICIENCY: ICD-10-CM

## 2022-09-19 RX ORDER — ERGOCALCIFEROL 1.25 MG/1
CAPSULE ORAL
Qty: 4 CAPSULE | Refills: 1 | Status: SHIPPED | OUTPATIENT
Start: 2022-09-19 | End: 2022-10-12

## 2022-10-12 DIAGNOSIS — E55.9 VITAMIN D DEFICIENCY: ICD-10-CM

## 2022-10-12 RX ORDER — ERGOCALCIFEROL 1.25 MG/1
CAPSULE ORAL
Qty: 4 CAPSULE | Refills: 1 | Status: SHIPPED | OUTPATIENT
Start: 2022-10-12

## 2022-10-14 ENCOUNTER — IMMUNIZATION (OUTPATIENT)
Dept: PRIMARY CARE CLINIC | Age: 57
End: 2022-10-14
Payer: COMMERCIAL

## 2022-10-14 ENCOUNTER — NURSE ONLY (OUTPATIENT)
Dept: PRIMARY CARE CLINIC | Age: 57
End: 2022-10-14
Payer: COMMERCIAL

## 2022-10-14 DIAGNOSIS — Z23 NEED FOR VACCINATION: Primary | ICD-10-CM

## 2022-10-14 PROCEDURE — 0124A COVID-19, PFIZER BIVALENT BOOSTER, (AGE 12Y+), IM, 30 MCG/0.3 ML: CPT | Performed by: FAMILY MEDICINE

## 2022-10-14 PROCEDURE — 90471 IMMUNIZATION ADMIN: CPT | Performed by: FAMILY MEDICINE

## 2022-10-14 PROCEDURE — 91312 COVID-19, PFIZER BIVALENT BOOSTER, (AGE 12Y+), IM, 30 MCG/0.3 ML: CPT | Performed by: FAMILY MEDICINE

## 2022-10-14 PROCEDURE — 90674 CCIIV4 VAC NO PRSV 0.5 ML IM: CPT | Performed by: FAMILY MEDICINE

## 2022-10-14 NOTE — PROGRESS NOTES
After obtaining consent, and per orders of Dr. Magi Chinchilla, injection of Vaughan Peter booster given in Right deltoid by Cristhian Vincent MA. Patient instructed to remain in clinic for 20 minutes afterwards, and to report any adverse reaction to me immediately.

## 2022-10-25 DIAGNOSIS — I10 ESSENTIAL HYPERTENSION: ICD-10-CM

## 2022-10-25 RX ORDER — TRIAMTERENE AND HYDROCHLOROTHIAZIDE 75; 50 MG/1; MG/1
TABLET ORAL
Qty: 90 TABLET | Refills: 1 | Status: SHIPPED | OUTPATIENT
Start: 2022-10-25

## 2022-11-02 ENCOUNTER — HOSPITAL ENCOUNTER (OUTPATIENT)
Age: 57
Discharge: HOME | End: 2022-11-04
Payer: COMMERCIAL

## 2022-11-02 ENCOUNTER — HOSPITAL ENCOUNTER (OUTPATIENT)
Dept: GENERAL RADIOLOGY | Age: 57
Discharge: HOME | End: 2022-11-04
Payer: COMMERCIAL

## 2022-11-02 DIAGNOSIS — M54.50 CHRONIC LOW BACK PAIN, UNSPECIFIED BACK PAIN LATERALITY, UNSPECIFIED WHETHER SCIATICA PRESENT: ICD-10-CM

## 2022-11-02 DIAGNOSIS — G89.29 CHRONIC BILATERAL LOW BACK PAIN, UNSPECIFIED WHETHER SCIATICA PRESENT: ICD-10-CM

## 2022-11-02 DIAGNOSIS — G89.29 CHRONIC LOW BACK PAIN, UNSPECIFIED BACK PAIN LATERALITY, UNSPECIFIED WHETHER SCIATICA PRESENT: ICD-10-CM

## 2022-11-02 DIAGNOSIS — M54.32 LEFT SCIATIC NERVE PAIN: ICD-10-CM

## 2022-11-02 DIAGNOSIS — M54.50 CHRONIC BILATERAL LOW BACK PAIN, UNSPECIFIED WHETHER SCIATICA PRESENT: ICD-10-CM

## 2022-11-02 PROCEDURE — 72100 X-RAY EXAM L-S SPINE 2/3 VWS: CPT

## 2022-11-07 ENCOUNTER — OFFICE VISIT (OUTPATIENT)
Dept: PRIMARY CARE CLINIC | Age: 57
End: 2022-11-07
Payer: COMMERCIAL

## 2022-11-07 VITALS
BODY MASS INDEX: 50.02 KG/M2 | HEIGHT: 64 IN | SYSTOLIC BLOOD PRESSURE: 128 MMHG | WEIGHT: 293 LBS | OXYGEN SATURATION: 98 % | DIASTOLIC BLOOD PRESSURE: 80 MMHG | HEART RATE: 78 BPM

## 2022-11-07 DIAGNOSIS — G89.29 CHRONIC LOW BACK PAIN, UNSPECIFIED BACK PAIN LATERALITY, UNSPECIFIED WHETHER SCIATICA PRESENT: ICD-10-CM

## 2022-11-07 DIAGNOSIS — I10 ESSENTIAL HYPERTENSION: ICD-10-CM

## 2022-11-07 DIAGNOSIS — E11.9 TYPE 2 DIABETES MELLITUS WITHOUT COMPLICATION, WITH LONG-TERM CURRENT USE OF INSULIN (HCC): Primary | ICD-10-CM

## 2022-11-07 DIAGNOSIS — M54.50 CHRONIC LOW BACK PAIN, UNSPECIFIED BACK PAIN LATERALITY, UNSPECIFIED WHETHER SCIATICA PRESENT: ICD-10-CM

## 2022-11-07 DIAGNOSIS — M47.816 ARTHRITIS OF LUMBAR SPINE: ICD-10-CM

## 2022-11-07 DIAGNOSIS — Z79.4 TYPE 2 DIABETES MELLITUS WITHOUT COMPLICATION, WITH LONG-TERM CURRENT USE OF INSULIN (HCC): Primary | ICD-10-CM

## 2022-11-07 LAB
CREATININE URINE POCT: NORMAL
HBA1C MFR BLD: 6.6 %
MICROALBUMIN/CREAT 24H UR: NORMAL MG/G{CREAT}
MICROALBUMIN/CREAT UR-RTO: NORMAL

## 2022-11-07 PROCEDURE — 1036F TOBACCO NON-USER: CPT | Performed by: FAMILY MEDICINE

## 2022-11-07 PROCEDURE — 3017F COLORECTAL CA SCREEN DOC REV: CPT | Performed by: FAMILY MEDICINE

## 2022-11-07 PROCEDURE — 99214 OFFICE O/P EST MOD 30 MIN: CPT | Performed by: FAMILY MEDICINE

## 2022-11-07 PROCEDURE — 3078F DIAST BP <80 MM HG: CPT | Performed by: FAMILY MEDICINE

## 2022-11-07 PROCEDURE — 2022F DILAT RTA XM EVC RTNOPTHY: CPT | Performed by: FAMILY MEDICINE

## 2022-11-07 PROCEDURE — 83036 HEMOGLOBIN GLYCOSYLATED A1C: CPT | Performed by: FAMILY MEDICINE

## 2022-11-07 PROCEDURE — G8427 DOCREV CUR MEDS BY ELIG CLIN: HCPCS | Performed by: FAMILY MEDICINE

## 2022-11-07 PROCEDURE — G8482 FLU IMMUNIZE ORDER/ADMIN: HCPCS | Performed by: FAMILY MEDICINE

## 2022-11-07 PROCEDURE — 3044F HG A1C LEVEL LT 7.0%: CPT | Performed by: FAMILY MEDICINE

## 2022-11-07 PROCEDURE — 82044 UR ALBUMIN SEMIQUANTITATIVE: CPT | Performed by: FAMILY MEDICINE

## 2022-11-07 PROCEDURE — G8417 CALC BMI ABV UP PARAM F/U: HCPCS | Performed by: FAMILY MEDICINE

## 2022-11-07 PROCEDURE — 3074F SYST BP LT 130 MM HG: CPT | Performed by: FAMILY MEDICINE

## 2022-11-07 RX ORDER — DULAGLUTIDE 3 MG/.5ML
3 INJECTION, SOLUTION SUBCUTANEOUS WEEKLY
Qty: 4 ADJUSTABLE DOSE PRE-FILLED PEN SYRINGE | Refills: 4 | Status: SHIPPED | OUTPATIENT
Start: 2022-11-07

## 2022-11-07 RX ORDER — LISINOPRIL 10 MG/1
10 TABLET ORAL DAILY
Qty: 90 TABLET | Refills: 2 | Status: SHIPPED | OUTPATIENT
Start: 2022-11-07

## 2022-11-07 RX ORDER — INSULIN DETEMIR 100 [IU]/ML
30 INJECTION, SOLUTION SUBCUTANEOUS 2 TIMES DAILY
Qty: 54 ML | Refills: 1 | Status: SHIPPED | OUTPATIENT
Start: 2022-11-07 | End: 2023-02-05

## 2022-11-07 RX ORDER — GLUCOSAMINE HCL/CHONDROITIN SU 500-400 MG
CAPSULE ORAL
Qty: 100 STRIP | Refills: 3 | Status: SHIPPED | OUTPATIENT
Start: 2022-11-07

## 2022-11-07 NOTE — PROGRESS NOTES
[facility] PRIMARY [facility]ARE  [address] [facility] [address]  Dept: [phone]    Nova Adames is a 65 y.o. female Established patient, who presents today for her medical conditions/complaintsas noted below. Chief Complaint   Patient presents with    Diabetes     Patient is here today for DM follow up. Patient would like to go over back xray results       HPI:     HPI  Home sugars below 150 in am.   A1C 6.6 today  was 8.5  Woke up one night with weird awful dreams, sometimes gets shakey  Having trouble getting her insulin and trulicity, pharmacy has been out of med. Sometimes has been out of trulicity 2-3 weeks due to pharmacy  Has been trying weight watchers;    Has concerned about the stress test.   Reviewed prior notes None  Reviewed previous Labs    LDL Cholesterol (mg/dL)   Date Value   2022 119   2022 119   2019 118     LDL Calculated (mg/dL)   Date Value   2011 89       (goal LDL is <100)   AST (U/L)   Date Value   2022 29   2022 29     ALT (U/L)   Date Value   2022 52 (H)   2022 52 (H)     BUN (mg/dL)   Date Value   2022 12   2022 12     Hemoglobin A1C (%)   Date Value   2022 8.5     TSH (mIU/L)   Date Value   2022 0.54     BP Readings from Last 3 Encounters:   22 128/80   22 139/83   22 126/78          (goal 120/80)    Past Medical History:   Diagnosis Date    Acute left-sided low back pain with left-sided sciatica     Arthritis     BMI 70 and over, adult ([address]     Cancer ([address] 2013    endometrial    Diabetes mellitus ([address]     High blood pressure     Hypertension     Sleep apnea       Past Surgical History:   Procedure Laterality Date     SECTION      HYSTERECTOMY, TOTAL ABDOMINAL (CERVIX REMOVED)      OTHER SURGICAL HISTORY      [id number] multiple procedures to remove cancer       Family History   Problem Relation Age of Onset    Cancer Mother thyroid    Diabetes Mother     Anemia Other     Cancer Other     Breast Cancer Other     Diabetes Other     High Blood Pressure Other     Heart Disease Other     Hypertension Father        Social History     Tobacco Use    Smoking status: Never    Smokeless tobacco: Never   Substance Use Topics    Alcohol use: No      Current Outpatient Medications   Medication Sig Dispense Refill    insulin detemir (LEVEMIR FLEXTOUCH) 100 UNIT/ML injection pen Inject 30 Units into the skin 2 times daily If sugars not down increase to 40 units BID 54 mL 1    triamterene-hydroCHLOROthiazide (MAXZIDE) 75-50 MG per tablet TAKE 1 TABLET BY MOUTH EVERY DAY 90 tablet 1    vitamin D (ERGOCALCIFEROL) 1.25 MG (74056 UT) CAPS capsule TAKE 1 CAPSULE BY MOUTH ONE TIME PER WEEK FOR 8 DOSES 4 capsule 1    metFORMIN (GLUCOPHAGE) 500 MG tablet TAKE 1 TABLET BY MOUTH TWICE A DAY WITH MEALS 180 tablet 1    metoprolol tartrate (LOPRESSOR) 25 MG tablet TAKE 1 TABLET BY MOUTH TWICE A DAY      lisinopril (PRINIVIL;ZESTRIL) 10 MG tablet Take 1 tablet by mouth daily TAKE 1 TABLET BY MOUTH EVERY DAY 90 tablet 2    blood glucose monitor strips Test three times a day & as needed for symptoms of irregular blood glucose. Dispense sufficient amount for indicated testing frequency plus additional to accommodate PRN testing needs. Type 2 DM on insulin shots, uncontrolled. 100 strip 5    Dulaglutide (TRULICITY) 3 TB/3.5OR SOPN Inject 3 mg into the skin once a week 4 pen 5    Insulin Pen Needle (B-D ULTRAFINE III SHORT PEN) 31G X 8 MM MISC Inject 1 each into the skin 2 times daily May substitute with any brand 200 each 11    Handicap Placard MISC by Does not apply route For orthopedic disease. Expires 12/6/2026 1 each 0    Lancets MISC 1 each by Does not apply route 3 times daily 100 each 5    blood glucose monitor strips Test 3  times a day & as needed for symptoms of irregular blood glucose.  Dispense sufficient amount for indicated testing frequency plus additional to accommodate PRN testing needs. Type 2 DM on insulin 100 strip 3    Saline (SIMPLY SALINE) 0.9 % AERS 2 sprays by Nasal route 4 times daily  0    Continuous Blood Gluc Sensor (FREESTYLE LUIS ANGEL 14 DAY SENSOR) MISC 1 Device by Does not apply route in the morning, at noon, and at bedtime Uncontrolled type 2 DM on insulin, shots BID (Patient not taking: Reported on 11/7/2022) 2 each 5    Continuous Blood Gluc  (FREESTYLE LUIS ANGEL 2 READER) MELQUIADES 1 Device by Does not apply route in the morning, at noon, and at bedtime Type 2 DM on insulin BID, uncontrolled DM (Patient not taking: Reported on 11/7/2022) 1 each 1     No current facility-administered medications for this visit. Allergies   Allergen Reactions    Latex Itching and Rash    Penicillins Anaphylaxis and Other (See Comments)     Passes out, all penicillins        Health Maintenance   Topic Date Due    HIV screen  Never done    Hepatitis C screen  Never done    Hepatitis B vaccine (1 of 3 - Risk 3-dose series) Never done    Colorectal Cancer Screen  Never done    Breast cancer screen  Never done    Shingles vaccine (1 of 2) Never done    Diabetic foot exam  01/22/2021    Diabetic microalbuminuria test  01/22/2021    Pneumococcal 0-64 years Vaccine (2 - PCV) 09/25/2022    Depression Screen  12/30/2022    Lipids  02/21/2023    A1C test (Diabetic or Prediabetic)  05/19/2023    Diabetic retinal exam  09/30/2023    DTaP/Tdap/Td vaccine (2 - Td or Tdap) 10/27/2027    Flu vaccine  Completed    COVID-19 Vaccine  Completed    Hepatitis A vaccine  Aged Out    Hib vaccine  Aged Out    Meningococcal (ACWY) vaccine  Aged Out       Subjective:      Review of Systems  A lot of back pain, worse with walking and standing. Gets severe stabbing pain in Left lower back and buttock area. Laying down : if lays the wrong way gets severe pain. Didn't go to PT. Due to trouble with insurance  Works as an .      Objective:     /80   Pulse 78   Ht 5' 4\" (1.626 m)   Wt (!) 349 lb (158.3 kg)   SpO2 98%   BMI 59.91 kg/m²   Physical Exam  Vitals and nursing note reviewed. Constitutional:       General: She is not in acute distress. Appearance: She is well-developed. She is not ill-appearing. HENT:      Head: Normocephalic and atraumatic. Right Ear: External ear normal.      Left Ear: External ear normal.   Eyes:      General: No scleral icterus. Right eye: No discharge. Left eye: No discharge. Conjunctiva/sclera: Conjunctivae normal.   Neck:      Thyroid: No thyromegaly. Trachea: No tracheal deviation. Cardiovascular:      Rate and Rhythm: Normal rate and regular rhythm. Heart sounds: Normal heart sounds. Pulmonary:      Effort: Pulmonary effort is normal. No respiratory distress. Breath sounds: Normal breath sounds. No wheezing. Musculoskeletal:      Comments: Lumbar: ROM 90 degrees, extension 5 degrees, decreased side to side motion   Lymphadenopathy:      Cervical: No cervical adenopathy. Skin:     General: Skin is warm. Findings: No rash. Neurological:      Mental Status: She is alert and oriented to person, place, and time. Psychiatric:         Mood and Affect: Mood normal.         Behavior: Behavior normal.         Thought Content: Thought content normal.       Assessment:       Diagnosis Orders   1. Type 2 diabetes mellitus without complication, with long-term current use of insulin ([facility])  POCT glycosylated hemoglobin (Hb A1C)    POCT microalbumin    insulin detemir (LEVEMIR FLEXTOUCH) 100 UNIT/ML injection pen      2. Essential hypertension        3. Chronic low back pain, unspecified back pain laterality, unspecified whether sciatica present  External Referral To Physical Therapy      4. Arthritis of lumbar spine  External Referral To Physical Therapy           Plan:      No follow-ups on file. Try cutting back on insulin due to excellent A1C. Shoulder rolls, gentle neck stretches.    Orders Placed This Encounter   Procedures    External Referral To Physical Therapy     Referral Priority:   Routine     Referral Type:   Eval and Treat     Referral Reason:   Specialty Services Required     Requested Specialty:   Physical Therapist     Number of Visits Requested:   1    POCT glycosylated hemoglobin (Hb A1C)    POCT microalbumin     Orders Placed This Encounter   Medications    insulin detemir (LEVEMIR FLEXTOUCH) 100 UNIT/ML injection pen     Sig: Inject 30 Units into the skin 2 times daily If sugars not down increase to 40 units BID     Dispense:  54 mL     Refill:  1       Patient given educationalmaterials - see patient instructions. Discussed use, benefit, and side effectsof prescribed medications. All patient questions answered. Pt voiced understanding. Reviewed health maintenance. Instructed to continue current medications, diet andexercise. Patient agreed with treatment plan. Follow up as directed.      Electronicallysigned by Candida Romero MD on 11/7/2022 at 8:40 AM

## 2022-11-07 NOTE — PATIENT INSTRUCTIONS
Patient Education        Back Stretches: Exercises  Introduction  Here are some examples of exercises for stretching your back. Start each exercise slowly. Ease off the exercise if you start to have pain. Your doctor or physical therapist will tell you when you can start these exercises and which ones will work best for you. How to do the exercises  Overhead stretch  Stand comfortably with your feet shoulder-width apart. Looking straight ahead, raise both arms over your head and reach toward the ceiling. Do not allow your head to tilt back. Hold for 15 to 30 seconds, then lower your arms to your sides. Repeat 2 to 4 times. Side stretch  Stand comfortably with your feet shoulder-width apart. Raise one arm over your head, and then lean to the other side. Slide your hand down your leg as you let the weight of your arm gently stretch your side muscles. Hold for 15 to 30 seconds. Repeat 2 to 4 times on each side. Press-up  Lie on your stomach, supporting your body with your forearms. Press your elbows down into the floor to raise your upper back. As you do this, relax your stomach muscles and allow your back to arch without using your back muscles. As your press up, do not let your hips or pelvis come off the floor. Hold for 15 to 30 seconds, then relax. Repeat 2 to 4 times. Relax and rest  Lie on your back with a rolled towel under your neck and a pillow under your knees. Extend your arms comfortably to your sides. Relax and breathe normally. Remain in this position for about 10 minutes. If you can, do this 2 or 3 times each day. Follow-up care is a key part of your treatment and safety. Be sure to make and go to all appointments, and call your doctor if you are having problems. It's also a good idea to know your test results and keep a list of the medicines you take. Current as of: March 9, 2022               Content Version: 13.4  © 2006-2022 [employer].    Care instructions adapted under license by [facility] ([facility]). If you have questions about a medical condition or this instruction, always ask your healthcare professional. [address] any warranty or liability for your use of this information.

## 2022-11-07 NOTE — RESULT ENCOUNTER NOTE
Reviewed with patient at OV, + arthritis, mild slippage in the vertebrae which goes along with arthritis.

## 2022-11-15 DIAGNOSIS — E55.9 VITAMIN D DEFICIENCY: ICD-10-CM

## 2022-11-15 RX ORDER — ERGOCALCIFEROL 1.25 MG/1
CAPSULE ORAL
Qty: 4 CAPSULE | Refills: 1 | Status: SHIPPED | OUTPATIENT
Start: 2022-11-15

## 2022-11-26 DIAGNOSIS — Z79.4 TYPE 2 DIABETES MELLITUS WITHOUT COMPLICATION, WITH LONG-TERM CURRENT USE OF INSULIN (HCC): ICD-10-CM

## 2022-11-26 DIAGNOSIS — E11.9 TYPE 2 DIABETES MELLITUS WITHOUT COMPLICATION, WITH LONG-TERM CURRENT USE OF INSULIN (HCC): ICD-10-CM

## 2022-12-05 ENCOUNTER — HOSPITAL ENCOUNTER (EMERGENCY)
Age: 57
Discharge: HOME | End: 2022-12-05
Attending: EMERGENCY MEDICINE
Payer: COMMERCIAL

## 2022-12-05 ENCOUNTER — APPOINTMENT (OUTPATIENT)
Dept: GENERAL RADIOLOGY | Age: 57
End: 2022-12-05
Payer: COMMERCIAL

## 2022-12-05 VITALS
RESPIRATION RATE: 19 BRPM | HEIGHT: 64 IN | WEIGHT: 293 LBS | HEART RATE: 91 BPM | TEMPERATURE: 97.6 F | DIASTOLIC BLOOD PRESSURE: 58 MMHG | SYSTOLIC BLOOD PRESSURE: 124 MMHG | BODY MASS INDEX: 50.02 KG/M2 | OXYGEN SATURATION: 99 %

## 2022-12-05 DIAGNOSIS — R07.89 ATYPICAL CHEST PAIN: Primary | ICD-10-CM

## 2022-12-05 LAB
ABSOLUTE EOS #: 0.2 K/UL (ref 0–0.4)
ABSOLUTE LYMPH #: 2.6 K/UL (ref 1–4.8)
ABSOLUTE MONO #: 0.8 K/UL (ref 0.1–1.2)
ANION GAP SERPL CALCULATED.3IONS-SCNC: 14 MMOL/L (ref 9–17)
BASOPHILS # BLD: 1 % (ref 0–2)
BASOPHILS ABSOLUTE: 0.1 K/UL (ref 0–0.2)
BUN BLDV-MCNC: 24 MG/DL (ref 6–20)
CALCIUM SERPL-MCNC: 10 MG/DL (ref 8.6–10.4)
CHLORIDE BLD-SCNC: 99 MMOL/L (ref 98–107)
CO2: 23 MMOL/L (ref 20–31)
CREAT SERPL-MCNC: 0.87 MG/DL (ref 0.5–0.9)
EOSINOPHILS RELATIVE PERCENT: 2 % (ref 1–4)
GFR SERPL CREATININE-BSD FRML MDRD: >60 ML/MIN/1.73M2
GLUCOSE BLD-MCNC: 136 MG/DL (ref 70–99)
HCT VFR BLD CALC: 35.1 % (ref 36–46)
HEMOGLOBIN: 12 G/DL (ref 12–16)
LYMPHOCYTES # BLD: 26 % (ref 24–44)
MCH RBC QN AUTO: 31.8 PG (ref 26–34)
MCHC RBC AUTO-ENTMCNC: 34.2 G/DL (ref 31–37)
MCV RBC AUTO: 93.1 FL (ref 80–100)
MONOCYTES # BLD: 7 % (ref 2–11)
PDW BLD-RTO: 13.6 % (ref 12.5–15.4)
PLATELET # BLD: 281 K/UL (ref 140–450)
PMV BLD AUTO: 7.8 FL (ref 6–12)
POTASSIUM SERPL-SCNC: 4.5 MMOL/L (ref 3.7–5.3)
RBC # BLD: 3.77 M/UL (ref 4–5.2)
SEG NEUTROPHILS: 64 % (ref 36–66)
SEGMENTED NEUTROPHILS ABSOLUTE COUNT: 6.7 K/UL (ref 1.8–7.7)
SODIUM BLD-SCNC: 136 MMOL/L (ref 135–144)
TROPONIN, HIGH SENSITIVITY: 7 NG/L (ref 0–14)
TROPONIN, HIGH SENSITIVITY: 7 NG/L (ref 0–14)
WBC # BLD: 10.4 K/UL (ref 3.5–11)

## 2022-12-05 PROCEDURE — 71045 X-RAY EXAM CHEST 1 VIEW: CPT

## 2022-12-05 PROCEDURE — 93005 ELECTROCARDIOGRAM TRACING: CPT | Performed by: EMERGENCY MEDICINE

## 2022-12-05 PROCEDURE — 99285 EMERGENCY DEPT VISIT HI MDM: CPT

## 2022-12-05 PROCEDURE — 80048 BASIC METABOLIC PNL TOTAL CA: CPT

## 2022-12-05 PROCEDURE — 36415 COLL VENOUS BLD VENIPUNCTURE: CPT

## 2022-12-05 PROCEDURE — 6370000000 HC RX 637 (ALT 250 FOR IP): Performed by: EMERGENCY MEDICINE

## 2022-12-05 PROCEDURE — 85025 COMPLETE CBC W/AUTO DIFF WBC: CPT

## 2022-12-05 PROCEDURE — 84484 ASSAY OF TROPONIN QUANT: CPT

## 2022-12-05 RX ORDER — ASPIRIN 81 MG/1
162 TABLET, CHEWABLE ORAL ONCE
Status: COMPLETED | OUTPATIENT
Start: 2022-12-05 | End: 2022-12-05

## 2022-12-05 RX ORDER — KETOROLAC TROMETHAMINE 15 MG/ML
15 INJECTION, SOLUTION INTRAMUSCULAR; INTRAVENOUS ONCE
Status: DISCONTINUED | OUTPATIENT
Start: 2022-12-05 | End: 2022-12-06 | Stop reason: HOSPADM

## 2022-12-05 RX ADMIN — ASPIRIN 162 MG: 81 TABLET, CHEWABLE ORAL at 20:30

## 2022-12-05 ASSESSMENT — ENCOUNTER SYMPTOMS
RESPIRATORY NEGATIVE: 1
GASTROINTESTINAL NEGATIVE: 1
ALLERGIC/IMMUNOLOGIC NEGATIVE: 1
EYES NEGATIVE: 1

## 2022-12-05 ASSESSMENT — PAIN DESCRIPTION - LOCATION: LOCATION: CHEST

## 2022-12-05 ASSESSMENT — PAIN SCALES - GENERAL: PAINLEVEL_OUTOF10: 0

## 2022-12-05 ASSESSMENT — PAIN - FUNCTIONAL ASSESSMENT: PAIN_FUNCTIONAL_ASSESSMENT: 0-10

## 2022-12-06 LAB
EKG ATRIAL RATE: 111 BPM
EKG P AXIS: 54 DEGREES
EKG P-R INTERVAL: 162 MS
EKG Q-T INTERVAL: 338 MS
EKG QRS DURATION: 86 MS
EKG QTC CALCULATION (BAZETT): 459 MS
EKG R AXIS: 14 DEGREES
EKG T AXIS: 76 DEGREES
EKG VENTRICULAR RATE: 111 BPM

## 2022-12-06 NOTE — DISCHARGE INSTRUCTIONS
Already had work-up here has been negative x2 your EKG is unremarkable chest x-ray is negative you are able to be discharged home after having gotten some medication here we believe that this is a muscle type pain and nothing to do with your heart. You should follow-up with your own doctors within 2 days and return back to the emergency setting if worse.

## 2022-12-06 NOTE — ED PROVIDER NOTES
eMERGENCY dEPARTMENT eNCOUnter      Pt Name: Usha Wilks  MRN: [id number]  [place] 1965  Date of evaluation: 2022      CHIEF COMPLAINT       Chief Complaint   Patient presents with    Chest Pain     Started at 5, intermittent pain, did not take anything for it, states she is naseous          HISTORY OF PRESENT ILLNESS    Usha Wilks is a 65 y.o. female who presents emergency department for evaluation of intermittent episodes of sharp central chest pain that started about 2 and half hours before presentation here. Patient did not take any medicines at home she states she is kind of nauseated. She has been evaluated by a cardiologist secondary to preop evaluation for bariatric surgery. REVIEW OF SYSTEMS       Review of Systems   Constitutional: Negative. HENT: Negative. Eyes: Negative. Respiratory: Negative. Cardiovascular:  Positive for chest pain. Gastrointestinal: Negative. Endocrine: Negative. Genitourinary: Negative. Musculoskeletal: Negative. Skin: Negative. Allergic/Immunologic: Negative. Neurological: Negative. Hematological: Negative. Psychiatric/Behavioral: Negative. PAST MEDICAL HISTORY    has a past medical history of Acute left-sided low back pain with left-sided sciatica, Arthritis, BMI 70 and over, adult ([address] Cancer ([address] Diabetes mellitus ([address] High blood pressure, Hypertension, and Sleep apnea. SURGICAL HISTORY      has a past surgical history that includes other surgical history; Hysterectomy, total abdominal; and  section. CURRENT MEDICATIONS       Previous Medications    BLOOD GLUCOSE MONITOR STRIPS    Test 3  times a day & as needed for symptoms of irregular blood glucose. Dispense sufficient amount for indicated testing frequency plus additional to accommodate PRN testing needs.  Type 2 DM on insulin    DULAGLUTIDE (TRULICITY) 3 CF/9.3AY SOPN    Inject 3 mg into the skin once a week    HANDICAP PLACARD MISC    by Does not apply route For orthopedic disease. Expires 2026    INSULIN DETEMIR (LEVEMIR FLEXTOUCH) 100 UNIT/ML INJECTION PEN    Inject 30 Units into the skin 2 times daily If sugars not down increase to 40 units BID    INSULIN PEN NEEDLE (B-D ULTRAFINE III SHORT PEN) 31G X 8 MM MISC    Inject 1 each into the skin 2 times daily May substitute with any brand    LANCETS MISC    1 each by Does not apply route 3 times daily    LISINOPRIL (PRINIVIL;ZESTRIL) 10 MG TABLET    Take 1 tablet by mouth daily TAKE 1 TABLET BY MOUTH EVERY DAY    METFORMIN (GLUCOPHAGE) 500 MG TABLET    TAKE 1 TABLET BY MOUTH TWICE A DAY WITH MEALS    METOPROLOL TARTRATE (LOPRESSOR) 25 MG TABLET    TAKE 1 TABLET BY MOUTH TWICE A DAY    SALINE (SIMPLY SALINE) 0.9 % AERS    2 sprays by Nasal route 4 times daily    TRIAMTERENE-HYDROCHLOROTHIAZIDE (MAXZIDE) 75-50 MG PER TABLET    TAKE 1 TABLET BY MOUTH EVERY DAY    VITAMIN D (ERGOCALCIFEROL) 1.25 MG (26626 UT) CAPS CAPSULE    TAKE 1 CAPSULE BY MOUTH ONE TIME PER WEEK FOR 8 DOSES       ALLERGIES     is allergic to latex and penicillins. FAMILY HISTORY     She indicated that her mother is . She indicated that her father is . She indicated that the status of her other is unknown.     family history includes Anemia in an other family member; Breast Cancer in an other family member; Cancer in her mother and another family member; Diabetes in her mother and another family member; Heart Disease in an other family member; High Blood Pressure in an other family member; Hypertension in her father. SOCIAL HISTORY      reports that she has never smoked. She has never used smokeless tobacco. She reports that she does not drink alcohol and does not use drugs. PHYSICAL EXAM     INITIAL VITALS:  height is 5' 4\" (1.626 m) and weight is 156.9 kg (346 lb) (abnormal). Her blood pressure is 155/83 (abnormal) and her pulse is 97. Her respiration is 19 and oxygen saturation is 99%. Constitutional: Alert, oriented x3, nontoxic, afebrile, answering questions appropriately, acting properly for age, in no acute distress  HEENT: Extraocular muscles intact, mucus membranes moist, TMs clear bilaterally, no posterior pharyngeal erythema or exudates, Pupils equal, round, reactive to light,   Neck: Trachea midline, Supple without lymphadenopathy, no posterior midline neck tenderness to palpation  Cardiovascular: Regular rhythm and rate no S3, S4, or murmurs  Respiratory: Clear to auscultation bilaterally no wheezes, rhonchi, rales, no respiratory distress  Gastrointestinal: Soft, nontender, nondistended, positive bowel sounds. No rebound, rigidity, or guarding. Musculoskeletal: No extremity pain or swelling  Neurologic: Moving all 4 extremities without difficulty there are no gross focal neurologic deficits  Skin: Warm and dry    DIFFERENTIAL DIAGNOSIS/ MDM:     Sharp chest pain of uncertain etiology, part patient will get a cardiac work-up and give her the Toradol reevaluated. DIAGNOSTIC RESULTS     EKG: All EKG's are interpreted by the Emergency Department Physician who either signs or Co-signs this chart in the absence of a cardiologist.  EKG is a sinus tachycardia at 111 beats a minute, MT interval point 162, QRS duration point 086, QTc of 459 ms no obvious ST or T wave changes indicative any ischemia.       Not indicated unless otherwise documented above    LABS:  Results for orders placed or performed during the hospital encounter of 12/05/22   CBC with Auto Differential   Result Value Ref Range    WBC 10.4 3.5 - 11.0 k/uL    RBC 3.77 (L) 4.0 - 5.2 m/uL    Hemoglobin 12.0 12.0 - 16.0 g/dL    Hematocrit 35.1 (L) 36 - 46 %    MCV 93.1 80 - 100 fL    MCH 31.8 26 - 34 pg    MCHC 34.2 31 - 37 g/dL    RDW 13.6 12.5 - 15.4 %    Platelets 384 752 - 646 k/uL    MPV 7.8 6.0 - 12.0 fL    Seg Neutrophils 64 36 - 66 %    Lymphocytes 26 24 - 44 %    Monocytes 7 2 - 11 %    Eosinophils % 2 1 - 4 % Basophils 1 0 - 2 %    Segs Absolute 6.70 1.8 - 7.7 k/uL    Absolute Lymph # 2.60 1.0 - 4.8 k/uL    Absolute Mono # 0.80 0.1 - 1.2 k/uL    Absolute Eos # 0.20 0.0 - 0.4 k/uL    Basophils Absolute 0.10 0.0 - 0.2 k/uL   Basic Metabolic Panel   Result Value Ref Range    Glucose 136 (H) 70 - 99 mg/dL    BUN 24 (H) 6 - 20 mg/dL    Creatinine 0.87 0.50 - 0.90 mg/dL    Est, Glom Filt Rate >60 >60 mL/min/1.73m2    Calcium 10.0 8.6 - 10.4 mg/dL    Sodium 136 135 - 144 mmol/L    Potassium 4.5 3.7 - 5.3 mmol/L    Chloride 99 98 - 107 mmol/L    CO2 23 20 - 31 mmol/L    Anion Gap 14 9 - 17 mmol/L   Troponin   Result Value Ref Range    Troponin, High Sensitivity 7 0 - 14 ng/L   Troponin   Result Value Ref Range    Troponin, High Sensitivity 7 0 - 14 ng/L       Not indicated unless otherwise documented above    RADIOLOGY:   I reviewed the radiologist interpretations:  XR CHEST PORTABLE   Final Result   Unremarkable portable chest radiograph. Not indicated unless otherwise documented above    EMERGENCY DEPARTMENT COURSE:     The patient was given the following medications:  Orders Placed This Encounter   Medications    aspirin chewable tablet 162 mg    ketorolac (TORADOL) injection 15 mg        Vitals:    Vitals:    12/05/22 1943   BP: (!) 155/83   Pulse: 97   Resp: 19   TempSrc: Oral   SpO2: 99%   Weight: (!) 156.9 kg (346 lb)   Height: 5' 4\" (1.626 m)     -------------------------  BP (!) 155/83   Pulse 97   Resp 19   Ht 5' 4\" (1.626 m)   Wt (!) 156.9 kg (346 lb)   SpO2 99%   BMI 59.39 kg/m²         I have reviewed the disposition diagnosis with the patient and or their family/guardian. I have answered their questions and given discharge instructions. They voiced understanding of these instructions and did not have any further questions or complaints. CRITICAL CARE:    None    CONSULTS:    None    PROCEDURES:    None      OARRS Report if indicated             FINAL IMPRESSION      1.  Atypical chest pain DISPOSITION/PLAN   DISPOSITION Decision To Discharge    I have reviewed the disposition diagnosis with the patient and or their family/guardian. I have answered their questions and given discharge instructions. They voiced understanding of these instructions and did not have any further questions or complaints. Reevaluation: Patient's cardiac enzymes are negative x2 EKG is unremarkable chest x-ray is negative patient is gotten some relief from Toradol she is stable for discharge home at this point time to follow-up with her regular doctor. The patient presents with chest pain that is not suggesting in nature of pulmonary embolus, aortic dissection, cardiac ischemia, or other serious etiology. I considered an aortic dissection, but this is unlikely as patient is not complaining of tearing or ripping chest pain that is radiating to the back, the patient has no new neurological abnormalities and pulses are equal to all extremities. Mediastinum is within normal limits. Patient appears comfortable on physical exam and is not in distress. I also thought about a cardiac tamponade, but this is unlikely as patient is hemodynamically stable. Heart sounds are not distant, EKG does not show signs of electrical alternans and there is no JVD. I also thought about a tension pneumothorax, but this is unlikely given bilateral breath sounds and no signs of hemodynamic instability. I do not feel the patient has a PE. No clinical evidence of DVT. I thought about an esophageal perforation, but history and physical exam does not suggest vomiting, followed by chest pain. No signs of Hamman's crunch on physical exam; again, patient appears comfortable and is well appearing and non toxic. The patient has been instructed to return if the symptpoms change or worsen in any way. Given the extremely low risk of these diagnoses further testing and evaluation for these possibilites are not indicated at this time.  The patient appears stable for discharge and has been instructed to return immediately if the symptoms worsen in any way, or in 8-12 hr if not improved for re-evaluation. We also discussed returning to the Emergency Department immediately if new or worsening symptoms occur. We have discussed the symptoms which are most concerning (e.g., worsening pain, shortness of breath, a feeling of passing out, fever, any neurologic symptoms, abdominal pain or vomiting) that necessitate immediate return. The patient understands that at this time there is no evidence for a more malignant underlying process, but the patient also understands that early in the process of an illness or injury, an emergency department workup can be falsely reassuring. Routine discharge counseling was given, and the patient understands that worsening, changing or persistent symptoms should prompt an immediate call or follow up with their primary physician or return to the emergency department. The importance of appropriate follow up was also discussed. I have reviewed the disposition diagnosis with the patient and or their family/guardian. I have answered their questions and given discharge instructions. They voiced understanding of these instructions and did not have any further questions or complaints. PATIENT REFERRED TO:  John Stephen MD  [address]   [address] [facility] [id number] [phone]    In 2 days      DISCHARGE MEDICATIONS:  New Prescriptions    No medications on file       (Please note that portions of this note were completed with a voice recognition program.  Efforts were made to edit the dictations but occasionally words are mis-transcribed.)    Reggie Calero MD  Attending Emergency Physician           Reggie Calero MD  12/05/22 [id number]

## 2022-12-12 DIAGNOSIS — E55.9 VITAMIN D DEFICIENCY: ICD-10-CM

## 2022-12-12 RX ORDER — ERGOCALCIFEROL 1.25 MG/1
CAPSULE ORAL
Qty: 4 CAPSULE | Refills: 1 | Status: SHIPPED | OUTPATIENT
Start: 2022-12-12

## 2023-01-06 DIAGNOSIS — E55.9 VITAMIN D DEFICIENCY: ICD-10-CM

## 2023-01-06 RX ORDER — ERGOCALCIFEROL 1.25 MG/1
CAPSULE ORAL
Qty: 4 CAPSULE | Refills: 1 | Status: SHIPPED | OUTPATIENT
Start: 2023-01-06

## 2023-01-11 RX ORDER — DULAGLUTIDE 3 MG/.5ML
3 INJECTION, SOLUTION SUBCUTANEOUS WEEKLY
Qty: 12 ADJUSTABLE DOSE PRE-FILLED PEN SYRINGE | Refills: 2 | Status: SHIPPED | OUTPATIENT
Start: 2023-01-11 | End: 2023-04-11

## 2023-01-23 DIAGNOSIS — E55.9 VITAMIN D DEFICIENCY: ICD-10-CM

## 2023-01-23 DIAGNOSIS — Z79.4 TYPE 2 DIABETES MELLITUS WITHOUT COMPLICATION, WITH LONG-TERM CURRENT USE OF INSULIN (HCC): ICD-10-CM

## 2023-01-23 DIAGNOSIS — I10 ESSENTIAL HYPERTENSION: ICD-10-CM

## 2023-01-23 DIAGNOSIS — E11.9 TYPE 2 DIABETES MELLITUS WITHOUT COMPLICATION, WITH LONG-TERM CURRENT USE OF INSULIN (HCC): ICD-10-CM

## 2023-01-23 RX ORDER — ERGOCALCIFEROL 1.25 MG/1
CAPSULE ORAL
Qty: 90 CAPSULE | Refills: 3 | Status: SHIPPED | OUTPATIENT
Start: 2023-01-23

## 2023-01-23 RX ORDER — LISINOPRIL 10 MG/1
10 TABLET ORAL DAILY
Qty: 90 TABLET | Refills: 3 | Status: SHIPPED | OUTPATIENT
Start: 2023-01-23

## 2023-01-23 RX ORDER — PEN NEEDLE, DIABETIC 31 GX5/16"
1 NEEDLE, DISPOSABLE MISCELLANEOUS 2 TIMES DAILY
Qty: 200 EACH | Refills: 11 | Status: SHIPPED | OUTPATIENT
Start: 2023-01-23

## 2023-01-23 RX ORDER — TRIAMTERENE AND HYDROCHLOROTHIAZIDE 75; 50 MG/1; MG/1
TABLET ORAL
Qty: 90 TABLET | Refills: 1 | Status: SHIPPED | OUTPATIENT
Start: 2023-01-23

## 2023-01-23 RX ORDER — INSULIN DETEMIR 100 [IU]/ML
30 INJECTION, SOLUTION SUBCUTANEOUS 2 TIMES DAILY
Qty: 54 ML | Refills: 3 | Status: SHIPPED | OUTPATIENT
Start: 2023-01-23 | End: 2023-04-23

## 2023-01-23 NOTE — TELEPHONE ENCOUNTER
LAST VISIT:   11/7/2022     Future Appointments   Date Time Provider César Hernandez   2/7/2023  8:40 AM [facility]   9/11/2023  1:00 PM Trisha Cochran, DO Resp Spec [facility]

## 2023-02-14 DIAGNOSIS — Z79.4 TYPE 2 DIABETES MELLITUS WITHOUT COMPLICATION, WITH LONG-TERM CURRENT USE OF INSULIN (HCC): ICD-10-CM

## 2023-02-14 DIAGNOSIS — E11.9 TYPE 2 DIABETES MELLITUS WITHOUT COMPLICATION, WITH LONG-TERM CURRENT USE OF INSULIN (HCC): ICD-10-CM

## 2023-02-14 RX ORDER — INSULIN DETEMIR 100 [IU]/ML
30 INJECTION, SOLUTION SUBCUTANEOUS 2 TIMES DAILY
Qty: 54 ML | Refills: 3 | Status: SHIPPED | OUTPATIENT
Start: 2023-02-14 | End: 2023-05-15

## 2023-02-15 DIAGNOSIS — E55.9 VITAMIN D DEFICIENCY: ICD-10-CM

## 2023-02-15 RX ORDER — ERGOCALCIFEROL 1.25 MG/1
50000 CAPSULE ORAL WEEKLY
Qty: 12 CAPSULE | Refills: 3 | Status: SHIPPED | OUTPATIENT
Start: 2023-02-15 | End: 2023-05-16

## 2023-02-15 NOTE — TELEPHONE ENCOUNTER
Per pharmacy - please clarify sig states only 8 doses with 90 capsules    Please review and send to pharmacy

## 2023-05-17 ENCOUNTER — PATIENT MESSAGE (OUTPATIENT)
Dept: PRIMARY CARE CLINIC | Age: 58
End: 2023-05-17

## 2023-05-17 DIAGNOSIS — I10 ESSENTIAL HYPERTENSION: ICD-10-CM

## 2023-05-17 RX ORDER — TRIAMTERENE AND HYDROCHLOROTHIAZIDE 75; 50 MG/1; MG/1
TABLET ORAL
Qty: 90 TABLET | Refills: 1 | Status: SHIPPED | OUTPATIENT
Start: 2023-05-17

## 2023-05-17 RX ORDER — DULAGLUTIDE 3 MG/.5ML
3 INJECTION, SOLUTION SUBCUTANEOUS WEEKLY
Qty: 12 ADJUSTABLE DOSE PRE-FILLED PEN SYRINGE | Refills: 2 | Status: SHIPPED | OUTPATIENT
Start: 2023-05-17 | End: 2023-08-15

## 2023-05-17 NOTE — TELEPHONE ENCOUNTER
From: Linwood Valenzuela  To: Dr. Adam Pandey: 5/17/2023 1:39 PM EDT  Subject: Medication Refills    Hi,    Can you please send refills for my Tramterene/hydrochlorothiazide and Trulicity to [facility]. This is the mail order service, not the local store. My insurance will only fill my prescriptions via mail order. The representative said the fax number is [phone].      Thank you,  Danny Benton

## 2023-05-31 ENCOUNTER — PATIENT MESSAGE (OUTPATIENT)
Dept: PRIMARY CARE CLINIC | Age: 58
End: 2023-05-31

## 2023-05-31 DIAGNOSIS — R51.9 SINUS HEADACHE: ICD-10-CM

## 2023-05-31 DIAGNOSIS — E11.9 TYPE 2 DIABETES MELLITUS WITHOUT COMPLICATION, WITHOUT LONG-TERM CURRENT USE OF INSULIN (HCC): ICD-10-CM

## 2023-05-31 DIAGNOSIS — R09.81 NASAL CONGESTION: ICD-10-CM

## 2023-05-31 DIAGNOSIS — Z79.4 TYPE 2 DIABETES MELLITUS WITHOUT COMPLICATION, WITH LONG-TERM CURRENT USE OF INSULIN (HCC): ICD-10-CM

## 2023-05-31 DIAGNOSIS — E11.9 TYPE 2 DIABETES MELLITUS WITHOUT COMPLICATION, WITH LONG-TERM CURRENT USE OF INSULIN (HCC): ICD-10-CM

## 2023-05-31 RX ORDER — FLASH GLUCOSE SENSOR
1 KIT MISCELLANEOUS 3 TIMES DAILY
Qty: 2 EACH | Refills: 5 | Status: SHIPPED | OUTPATIENT
Start: 2023-05-31

## 2023-05-31 RX ORDER — SODIUM CHLORIDE 3 %
2 AEROSOL, MIST NASAL 4 TIMES DAILY
Qty: 126 ML | Refills: 0 | Status: SHIPPED | OUTPATIENT
Start: 2023-05-31

## 2023-05-31 NOTE — TELEPHONE ENCOUNTER
From: Mary Martinez  To: Dr. Judy Bumpers: 5/31/2023 10:15 AM EDT  Subject: Mauro Floyd,    May I please have a new prescription for the cgm freestyle chata. I had one previously but my insurance would not cover it, but now they do and I am eligible. They did say I would have to pick it up at [facility] in [address] [place] [address].     Thank you,  Allan Newsome

## 2023-05-31 NOTE — TELEPHONE ENCOUNTER
LAST VISIT:   11/7/2022     Future Appointments   Date Time Provider César Husseini   6/28/2023  9:00 AM Valerie Ruff MD [facility]   9/11/2023  1:00 PM Mushtaq Cochran DO Resp Spec [facility]

## 2023-06-01 ENCOUNTER — PATIENT MESSAGE (OUTPATIENT)
Dept: PRIMARY CARE CLINIC | Age: 58
End: 2023-06-01

## 2023-06-01 DIAGNOSIS — E11.9 TYPE 2 DIABETES MELLITUS WITHOUT COMPLICATION, WITHOUT LONG-TERM CURRENT USE OF INSULIN (HCC): ICD-10-CM

## 2023-06-01 DIAGNOSIS — Z79.4 TYPE 2 DIABETES MELLITUS WITHOUT COMPLICATION, WITH LONG-TERM CURRENT USE OF INSULIN (HCC): ICD-10-CM

## 2023-06-01 DIAGNOSIS — E11.9 TYPE 2 DIABETES MELLITUS WITHOUT COMPLICATION, WITH LONG-TERM CURRENT USE OF INSULIN (HCC): ICD-10-CM

## 2023-06-01 NOTE — TELEPHONE ENCOUNTER
Alysa Tidwell MA 6/1/2023 1:53 PM EDT      ----- Message -----  From: Fransisco Galvin  Sent: 6/1/2023 1:36 PM EDT  To: [facility] Clinical Staff  Subject: Freestyle Venecia CGM     Hi,    Thank you for sending in the prescriptions I requested. Im sorry to bother you but [facility] in [place] couldnt fill the sensor they said it is the incorrect prescription. They have the reader but need a different script for the sensor. They said they sent you a request but asked me to reach out. Im not really sure what sensor is, but the machine is the freestyle Venecia 2.     Thank you

## 2023-09-15 ENCOUNTER — OFFICE VISIT (OUTPATIENT)
Dept: PRIMARY CARE CLINIC | Age: 58
End: 2023-09-15
Payer: COMMERCIAL

## 2023-09-15 VITALS
HEIGHT: 64 IN | DIASTOLIC BLOOD PRESSURE: 80 MMHG | WEIGHT: 293 LBS | SYSTOLIC BLOOD PRESSURE: 138 MMHG | BODY MASS INDEX: 50.02 KG/M2

## 2023-09-15 DIAGNOSIS — E11.9 TYPE 2 DIABETES MELLITUS WITHOUT COMPLICATION, WITH LONG-TERM CURRENT USE OF INSULIN (HCC): ICD-10-CM

## 2023-09-15 DIAGNOSIS — E11.9 TYPE 2 DIABETES MELLITUS WITHOUT COMPLICATION, WITHOUT LONG-TERM CURRENT USE OF INSULIN (HCC): Primary | ICD-10-CM

## 2023-09-15 DIAGNOSIS — Z23 NEED FOR VACCINATION: ICD-10-CM

## 2023-09-15 DIAGNOSIS — Z12.11 COLON CANCER SCREENING: ICD-10-CM

## 2023-09-15 DIAGNOSIS — Z79.4 TYPE 2 DIABETES MELLITUS WITHOUT COMPLICATION, WITH LONG-TERM CURRENT USE OF INSULIN (HCC): ICD-10-CM

## 2023-09-15 DIAGNOSIS — Z12.31 BREAST CANCER SCREENING BY MAMMOGRAM: ICD-10-CM

## 2023-09-15 DIAGNOSIS — E66.01 MORBID OBESITY WITH BMI OF 60.0-69.9, ADULT (HCC): ICD-10-CM

## 2023-09-15 LAB — HBA1C MFR BLD: 6.2 %

## 2023-09-15 PROCEDURE — 1036F TOBACCO NON-USER: CPT | Performed by: FAMILY MEDICINE

## 2023-09-15 PROCEDURE — 90674 CCIIV4 VAC NO PRSV 0.5 ML IM: CPT | Performed by: FAMILY MEDICINE

## 2023-09-15 PROCEDURE — 2022F DILAT RTA XM EVC RTNOPTHY: CPT | Performed by: FAMILY MEDICINE

## 2023-09-15 PROCEDURE — 3044F HG A1C LEVEL LT 7.0%: CPT | Performed by: FAMILY MEDICINE

## 2023-09-15 PROCEDURE — 3079F DIAST BP 80-89 MM HG: CPT | Performed by: FAMILY MEDICINE

## 2023-09-15 PROCEDURE — 3017F COLORECTAL CA SCREEN DOC REV: CPT | Performed by: FAMILY MEDICINE

## 2023-09-15 PROCEDURE — 90471 IMMUNIZATION ADMIN: CPT | Performed by: FAMILY MEDICINE

## 2023-09-15 PROCEDURE — 99214 OFFICE O/P EST MOD 30 MIN: CPT | Performed by: FAMILY MEDICINE

## 2023-09-15 PROCEDURE — G8417 CALC BMI ABV UP PARAM F/U: HCPCS | Performed by: FAMILY MEDICINE

## 2023-09-15 PROCEDURE — 3075F SYST BP GE 130 - 139MM HG: CPT | Performed by: FAMILY MEDICINE

## 2023-09-15 PROCEDURE — 83036 HEMOGLOBIN GLYCOSYLATED A1C: CPT | Performed by: FAMILY MEDICINE

## 2023-09-15 PROCEDURE — G8427 DOCREV CUR MEDS BY ELIG CLIN: HCPCS | Performed by: FAMILY MEDICINE

## 2023-09-15 SDOH — ECONOMIC STABILITY: HOUSING INSECURITY
IN THE LAST 12 MONTHS, WAS THERE A TIME WHEN YOU DID NOT HAVE A STEADY PLACE TO SLEEP OR SLEPT IN A SHELTER (INCLUDING NOW)?: NO

## 2023-09-15 SDOH — ECONOMIC STABILITY: INCOME INSECURITY: HOW HARD IS IT FOR YOU TO PAY FOR THE VERY BASICS LIKE FOOD, HOUSING, MEDICAL CARE, AND HEATING?: NOT VERY HARD

## 2023-09-15 SDOH — ECONOMIC STABILITY: FOOD INSECURITY: WITHIN THE PAST 12 MONTHS, YOU WORRIED THAT YOUR FOOD WOULD RUN OUT BEFORE YOU GOT MONEY TO BUY MORE.: NEVER TRUE

## 2023-09-15 SDOH — ECONOMIC STABILITY: FOOD INSECURITY: WITHIN THE PAST 12 MONTHS, THE FOOD YOU BOUGHT JUST DIDN'T LAST AND YOU DIDN'T HAVE MONEY TO GET MORE.: NEVER TRUE

## 2023-09-15 ASSESSMENT — PATIENT HEALTH QUESTIONNAIRE - PHQ9
SUM OF ALL RESPONSES TO PHQ QUESTIONS 1-9: 0
2. FEELING DOWN, DEPRESSED OR HOPELESS: 0
SUM OF ALL RESPONSES TO PHQ9 QUESTIONS 1 & 2: 0
1. LITTLE INTEREST OR PLEASURE IN DOING THINGS: 0
SUM OF ALL RESPONSES TO PHQ QUESTIONS 1-9: 0
SUM OF ALL RESPONSES TO PHQ QUESTIONS 1-9: 0
2. FEELING DOWN, DEPRESSED OR HOPELESS: NOT AT ALL
1. LITTLE INTEREST OR PLEASURE IN DOING THINGS: NOT AT ALL
SUM OF ALL RESPONSES TO PHQ9 QUESTIONS 1 & 2: 0
SUM OF ALL RESPONSES TO PHQ QUESTIONS 1-9: 0

## 2023-09-15 NOTE — PROGRESS NOTES
Instructed to continue current medications, diet andexercise. Patient agreed with treatment plan. Follow up as directed.      Electronicallysigned by Bereket Mejia MD on 9/15/2023 at 8:44 AM

## 2023-09-19 ENCOUNTER — PATIENT MESSAGE (OUTPATIENT)
Dept: PRIMARY CARE CLINIC | Age: 58
End: 2023-09-19

## 2023-09-29 ENCOUNTER — PATIENT MESSAGE (OUTPATIENT)
Dept: PRIMARY CARE CLINIC | Age: 58
End: 2023-09-29

## 2023-09-29 DIAGNOSIS — M54.50 CHRONIC BILATERAL LOW BACK PAIN, UNSPECIFIED WHETHER SCIATICA PRESENT: ICD-10-CM

## 2023-09-29 DIAGNOSIS — M47.816 ARTHRITIS OF LUMBAR SPINE: Primary | ICD-10-CM

## 2023-09-29 DIAGNOSIS — G89.29 CHRONIC BILATERAL LOW BACK PAIN, UNSPECIFIED WHETHER SCIATICA PRESENT: ICD-10-CM

## 2023-09-29 RX ORDER — KETOROLAC TROMETHAMINE 10 MG/1
10 TABLET, FILM COATED ORAL EVERY 6 HOURS PRN
Qty: 20 TABLET | Refills: 0 | Status: SHIPPED | OUTPATIENT
Start: 2023-09-29 | End: 2024-09-28

## 2023-10-16 DIAGNOSIS — E11.9 TYPE 2 DIABETES MELLITUS WITHOUT COMPLICATION, WITH LONG-TERM CURRENT USE OF INSULIN (HCC): ICD-10-CM

## 2023-10-16 DIAGNOSIS — Z79.4 TYPE 2 DIABETES MELLITUS WITHOUT COMPLICATION, WITH LONG-TERM CURRENT USE OF INSULIN (HCC): ICD-10-CM

## 2023-10-17 NOTE — TELEPHONE ENCOUNTER
LAST VISIT:   9/15/2023     Future Appointments   Date Time Provider Department Center   1/18/2024  8:20 AM MD JAIDEN Gil [facility]            [facility]

## 2023-10-18 DIAGNOSIS — E11.9 TYPE 2 DIABETES MELLITUS WITHOUT COMPLICATION, WITH LONG-TERM CURRENT USE OF INSULIN (HCC): ICD-10-CM

## 2023-10-18 DIAGNOSIS — Z79.4 TYPE 2 DIABETES MELLITUS WITHOUT COMPLICATION, WITH LONG-TERM CURRENT USE OF INSULIN (HCC): ICD-10-CM

## 2023-11-06 DIAGNOSIS — Z79.4 TYPE 2 DIABETES MELLITUS WITHOUT COMPLICATION, WITH LONG-TERM CURRENT USE OF INSULIN (HCC): ICD-10-CM

## 2023-11-06 DIAGNOSIS — E11.9 TYPE 2 DIABETES MELLITUS WITHOUT COMPLICATION, WITH LONG-TERM CURRENT USE OF INSULIN (HCC): ICD-10-CM

## 2023-11-06 NOTE — TELEPHONE ENCOUNTER
LAST VISIT:   9/15/2023     Future Appointments   Date Time Provider Department Center   1/18/2024  8:20 AM Elliot Mfcarlane MD [facility]

## 2024-01-04 ENCOUNTER — PATIENT MESSAGE (OUTPATIENT)
Dept: PRIMARY CARE CLINIC | Age: 59
End: 2024-01-04

## 2024-01-04 DIAGNOSIS — E11.9 TYPE 2 DIABETES MELLITUS WITHOUT COMPLICATION, WITH LONG-TERM CURRENT USE OF INSULIN (HCC): Primary | ICD-10-CM

## 2024-01-04 DIAGNOSIS — Z79.4 TYPE 2 DIABETES MELLITUS WITHOUT COMPLICATION, WITH LONG-TERM CURRENT USE OF INSULIN (HCC): Primary | ICD-10-CM

## 2024-01-04 RX ORDER — INSULIN GLARGINE 100 [IU]/ML
20 INJECTION, SOLUTION SUBCUTANEOUS 2 TIMES DAILY
Qty: 5 ADJUSTABLE DOSE PRE-FILLED PEN SYRINGE | Refills: 3
Start: 2024-01-04

## 2024-01-04 NOTE — TELEPHONE ENCOUNTER
Dinesh Tovar MA 1/4/2024 11:54 AM EST      ----- Message -----  From: Yamini Barnhart  Sent: 1/4/2024 8:11 AM EST  To: melida Mathur  Clinical Staff  Subject: Levemir     Good morning,    I received a notice that I would no longer be able to refill my Levemir as it was being discontinued or not covered by insurance. They recommended a replacement called Lantus (I think). I have an appointment with you on February 9 but not sure if I should come in sooner. Do you feel Lantus is good replacement for my Levemir? Please let me know.    Thank you and have a great day.    Yamini Barnhart

## 2024-01-07 DIAGNOSIS — E55.9 VITAMIN D DEFICIENCY: ICD-10-CM

## 2024-01-07 DIAGNOSIS — I10 ESSENTIAL HYPERTENSION: ICD-10-CM

## 2024-01-08 RX ORDER — TRIAMTERENE AND HYDROCHLOROTHIAZIDE 75; 50 MG/1; MG/1
TABLET ORAL
Qty: 90 TABLET | Refills: 1 | Status: SHIPPED | OUTPATIENT
Start: 2024-01-08

## 2024-01-08 RX ORDER — ERGOCALCIFEROL 1.25 MG/1
50000 CAPSULE ORAL WEEKLY
Qty: 12 CAPSULE | Refills: 3 | Status: SHIPPED | OUTPATIENT
Start: 2024-01-08 | End: 2024-03-01 | Stop reason: SDUPTHER

## 2024-01-08 NOTE — TELEPHONE ENCOUNTER
LAST VISIT:   9/15/2023     Future Appointments   Date Time Provider Department Center   2/9/2024  8:20 AM Matilda Calvillo MD [facility]   2/19/2024  4:00 PM Dandre Cochran DO Resp Spec [facility]

## 2024-01-14 DIAGNOSIS — Z79.4 TYPE 2 DIABETES MELLITUS WITHOUT COMPLICATION, WITH LONG-TERM CURRENT USE OF INSULIN (HCC): ICD-10-CM

## 2024-01-14 DIAGNOSIS — E11.9 TYPE 2 DIABETES MELLITUS WITHOUT COMPLICATION, WITH LONG-TERM CURRENT USE OF INSULIN (HCC): ICD-10-CM

## 2024-01-16 RX ORDER — PEN NEEDLE, DIABETIC 31 GX5/16"
NEEDLE, DISPOSABLE MISCELLANEOUS
Qty: 200 EACH | Refills: 3 | Status: SHIPPED | OUTPATIENT
Start: 2024-01-16

## 2024-01-17 ENCOUNTER — PATIENT MESSAGE (OUTPATIENT)
Dept: PRIMARY CARE CLINIC | Age: 59
End: 2024-01-17

## 2024-01-18 NOTE — TELEPHONE ENCOUNTER
From: Yamini Barnhart  Sent: 1/18/2024 7:23 AM EST  To: Matilda Calvillo MD  Subject: Metropolol tartrate 25 mg    ----- Message from Gabrielle Valerio sent at 1/18/2024 7:23 AM EST -----       ----- Message from Yamini Barnhart to Matilda Calvillo MD sent at 1/17/2024 4:06 PM -----   It goes to [facility]. They send them to via mail order.      ----- Message -----   From:YESSI PALACIO   Sent:1/17/2024 12:04 PM EST   To:Yamini Barnhart   Subject:Metropolol tartrate 25 mg    If approved, what pharmacy would you like that sent to?      ----- Message -----   From:Yamini Barnhart   Sent:1/17/2024 11:48 AM EST   To:Dr. Matilda Calvillo   Subject:Metropolol tartrate 25 mg    Hi Dr. Calvillo,     Can you please renew my metroplolol. I wasn’t able to do through the trent.     Thank you,  Yamini Barnhart

## 2024-01-20 DIAGNOSIS — E11.9 TYPE 2 DIABETES MELLITUS WITHOUT COMPLICATION, WITH LONG-TERM CURRENT USE OF INSULIN (HCC): ICD-10-CM

## 2024-01-20 DIAGNOSIS — Z79.4 TYPE 2 DIABETES MELLITUS WITHOUT COMPLICATION, WITH LONG-TERM CURRENT USE OF INSULIN (HCC): ICD-10-CM

## 2024-01-22 RX ORDER — INSULIN DETEMIR 100 [IU]/ML
INJECTION, SOLUTION SUBCUTANEOUS
Qty: 60 ML | Refills: 3 | OUTPATIENT
Start: 2024-01-22

## 2024-01-22 NOTE — TELEPHONE ENCOUNTER
Patient states she still has some Levemir and will use until she sees Dr. Calvillo on 2/9/24.  She has some questions about the Lantus and will address at that time.

## 2024-02-02 ENCOUNTER — TELEPHONE (OUTPATIENT)
Dept: PULMONOLOGY | Age: 59
End: 2024-02-02

## 2024-02-02 NOTE — TELEPHONE ENCOUNTER
Patient called and stated that DEE told her that we denied her script for new supplies.  I see nothing showing that but I explained to patient that we have not seen her since 9/22 and she has to be seen yearly per insurance and DME in order to get supplies.  I explained to patient that she will have to be seen before we can send an order.  Patients appt is 2/19/24

## 2024-02-09 ENCOUNTER — OFFICE VISIT (OUTPATIENT)
Dept: PRIMARY CARE CLINIC | Age: 59
End: 2024-02-09
Payer: COMMERCIAL

## 2024-02-09 VITALS
SYSTOLIC BLOOD PRESSURE: 130 MMHG | HEART RATE: 96 BPM | WEIGHT: 293 LBS | TEMPERATURE: 98.3 F | OXYGEN SATURATION: 99 % | DIASTOLIC BLOOD PRESSURE: 70 MMHG | BODY MASS INDEX: 51.91 KG/M2 | HEIGHT: 63 IN

## 2024-02-09 DIAGNOSIS — E11.9 TYPE 2 DIABETES MELLITUS WITHOUT COMPLICATION, WITH LONG-TERM CURRENT USE OF INSULIN (HCC): Primary | ICD-10-CM

## 2024-02-09 DIAGNOSIS — Z79.4 TYPE 2 DIABETES MELLITUS WITHOUT COMPLICATION, WITH LONG-TERM CURRENT USE OF INSULIN (HCC): Primary | ICD-10-CM

## 2024-02-09 DIAGNOSIS — Z12.31 BREAST CANCER SCREENING BY MAMMOGRAM: ICD-10-CM

## 2024-02-09 LAB — HBA1C MFR BLD: 6.5 %

## 2024-02-09 PROCEDURE — G8482 FLU IMMUNIZE ORDER/ADMIN: HCPCS | Performed by: FAMILY MEDICINE

## 2024-02-09 PROCEDURE — G8417 CALC BMI ABV UP PARAM F/U: HCPCS | Performed by: FAMILY MEDICINE

## 2024-02-09 PROCEDURE — 3075F SYST BP GE 130 - 139MM HG: CPT | Performed by: FAMILY MEDICINE

## 2024-02-09 PROCEDURE — 3017F COLORECTAL CA SCREEN DOC REV: CPT | Performed by: FAMILY MEDICINE

## 2024-02-09 PROCEDURE — G8427 DOCREV CUR MEDS BY ELIG CLIN: HCPCS | Performed by: FAMILY MEDICINE

## 2024-02-09 PROCEDURE — 3078F DIAST BP <80 MM HG: CPT | Performed by: FAMILY MEDICINE

## 2024-02-09 PROCEDURE — 2022F DILAT RTA XM EVC RTNOPTHY: CPT | Performed by: FAMILY MEDICINE

## 2024-02-09 PROCEDURE — 83036 HEMOGLOBIN GLYCOSYLATED A1C: CPT | Performed by: FAMILY MEDICINE

## 2024-02-09 PROCEDURE — 1036F TOBACCO NON-USER: CPT | Performed by: FAMILY MEDICINE

## 2024-02-09 PROCEDURE — 3044F HG A1C LEVEL LT 7.0%: CPT | Performed by: FAMILY MEDICINE

## 2024-02-09 PROCEDURE — 99214 OFFICE O/P EST MOD 30 MIN: CPT | Performed by: FAMILY MEDICINE

## 2024-02-09 RX ORDER — DULAGLUTIDE 3 MG/.5ML
INJECTION, SOLUTION SUBCUTANEOUS
COMMUNITY
Start: 2023-12-06

## 2024-02-09 ASSESSMENT — PATIENT HEALTH QUESTIONNAIRE - PHQ9
SUM OF ALL RESPONSES TO PHQ QUESTIONS 1-9: 0
2. FEELING DOWN, DEPRESSED OR HOPELESS: 0
SUM OF ALL RESPONSES TO PHQ QUESTIONS 1-9: 0
1. LITTLE INTEREST OR PLEASURE IN DOING THINGS: 0
SUM OF ALL RESPONSES TO PHQ QUESTIONS 1-9: 0
SUM OF ALL RESPONSES TO PHQ9 QUESTIONS 1 & 2: 0
SUM OF ALL RESPONSES TO PHQ QUESTIONS 1-9: 0

## 2024-02-09 ASSESSMENT — ENCOUNTER SYMPTOMS: BACK PAIN: 1

## 2024-02-09 NOTE — PROGRESS NOTES
DIGITAL SCREEN W OR WO CAD BILATERAL           Plan:      Return in about 4 months (around 6/9/2024) for diabetes mellitus, hypertension.  Consider PT  Should consider pool membership  Try to see if her work will cover a changeable desk: ie standing and sitting  Try to do some back stretches during work  Consider muscle relaxer when she has severe back pains.   Reviewed that viral URI can last 7-10 days, if she is not better or if worse call back for possible antibiotics    Orders Placed This Encounter   Procedures    LARA DIGITAL SCREEN W OR WO CAD BILATERAL     Standing Status:   Future     Standing Expiration Date:   2/9/2025     Order Specific Question:   Reason for exam:     Answer:   screening    Comprehensive Metabolic Panel, Fasting     Standing Status:   Future     Standing Expiration Date:   2/9/2025    Lipid Panel     Standing Status:   Future     Standing Expiration Date:   2/9/2025     Order Specific Question:   Is Patient Fasting?/# of Hours     Answer:   yes    Microalbumin, Ur     Standing Status:   Future     Standing Expiration Date:   2/9/2025    [facility] Podiatry     Referral Priority:   Routine     Referral Type:   Eval and Treat     Referral Reason:   Specialty Services Required     Requested Specialty:   Podiatry     Number of Visits Requested:   1    POCT glycosylated hemoglobin (Hb A1C)     No orders of the defined types were placed in this encounter.      Patient given educationalmaterials - see patient instructions.  Discussed use, benefit, and side effectsof prescribed medications.  All patient questions answered. Pt voiced understanding.Reviewed health maintenance.  Instructed to continue current medications, diet and look into pool membership for exercise.  Patient agreed with treatment plan. Follow up as directed.     Electronicallysigned by Matilda Calvillo MD on 2/9/2024 at 8:54 AM

## 2024-02-19 ENCOUNTER — TELEMEDICINE (OUTPATIENT)
Dept: PULMONOLOGY | Age: 59
End: 2024-02-19
Payer: COMMERCIAL

## 2024-02-19 DIAGNOSIS — G47.33 OSA TREATED WITH BIPAP: Primary | ICD-10-CM

## 2024-02-19 DIAGNOSIS — E66.01 CLASS 3 SEVERE OBESITY DUE TO EXCESS CALORIES WITH SERIOUS COMORBIDITY AND BODY MASS INDEX (BMI) OF 50.0 TO 59.9 IN ADULT (HCC): ICD-10-CM

## 2024-02-19 PROCEDURE — G0406 INPT/TELE FOLLOW UP 15: HCPCS | Performed by: INTERNAL MEDICINE

## 2024-02-19 ASSESSMENT — SLEEP AND FATIGUE QUESTIONNAIRES
HOW LIKELY ARE YOU TO NOD OFF OR FALL ASLEEP WHILE SITTING INACTIVE IN A PUBLIC PLACE: 0
HOW LIKELY ARE YOU TO NOD OFF OR FALL ASLEEP WHILE LYING DOWN TO REST IN THE AFTERNOON WHEN CIRCUMSTANCES PERMIT: 3
HOW LIKELY ARE YOU TO NOD OFF OR FALL ASLEEP WHILE SITTING AND READING: 1
HOW LIKELY ARE YOU TO NOD OFF OR FALL ASLEEP IN A CAR, WHILE STOPPED FOR A FEW MINUTES IN TRAFFIC: 0
HOW LIKELY ARE YOU TO NOD OFF OR FALL ASLEEP WHILE SITTING AND TALKING TO SOMEONE: 0
ESS TOTAL SCORE: 5
HOW LIKELY ARE YOU TO NOD OFF OR FALL ASLEEP WHILE SITTING QUIETLY AFTER LUNCH WITHOUT ALCOHOL: 0
HOW LIKELY ARE YOU TO NOD OFF OR FALL ASLEEP WHILE WATCHING TV: 1
HOW LIKELY ARE YOU TO NOD OFF OR FALL ASLEEP WHEN YOU ARE A PASSENGER IN A CAR FOR AN HOUR WITHOUT A BREAK: 0

## 2024-02-20 NOTE — PROGRESS NOTES
2024    Yamini Barnhart, was evaluated through a synchronous (real-time) audio-video encounter. The patient (or guardian if applicable) is aware that this is a billable service, which includes applicable co-pays. This Virtual Visit was conducted with patient's (and/or legal guardian's) consent. Patient identification was verified, and a caregiver was present when appropriate.   The patient was located at Home: [address] [place] [id number]  Provider was located at Facility (Appt Dept): [address] [facility] [address]  [place] [address]         Total time spent for this encounter: Not billed by time    --Dandre Cochran,  on 2024 at 8:12 PM    An electronic signature was used to authenticate this note.     Patient and physician are located in their individual locations. This is visit is completed via [employer] application [x]/ Telephone []     HPI:    Yamini Barnhart (:  1965) has requested an audio/video evaluation for the following concern(s):    Patient returns for follow up of sleep apnea. Since last visit 17 months ago, patient has been very compliant with BiPAP. Uses every night, for the entire night. Reports refreshing and restorative sleep. Denies snoring. Reports normal daytime alertness. Believes benefiting greatly.   Compliance download from 23 to 24 shows 100% compliance for average of 9hrs per night. Residual AHI 0.4. BiPAP pressure 19/15 cm H2O. patient states that she is having trouble with mask leak although compliance download does not really demonstrate this.  Nonetheless, her mask is old.  Reports no new health problems.  Lost about 80 pounds        Review of Systems    Prior to Visit Medications    Medication Sig Taking? Authorizing Provider   TRULICITY 3 MG/0.5ML SOPN  Yes Provider, MD Dorene   metoprolol tartrate (LOPRESSOR) 25 MG tablet TAKE 1 TABLET TWICE A DAY Yes Di Vargas MD   B-D ULTRAFINE III SHORT PEN 31G X 8 MM MISC USE AND DISCARD 1 PEN

## 2024-02-29 ENCOUNTER — PATIENT MESSAGE (OUTPATIENT)
Dept: PRIMARY CARE CLINIC | Age: 59
End: 2024-02-29

## 2024-02-29 DIAGNOSIS — Z79.4 TYPE 2 DIABETES MELLITUS WITHOUT COMPLICATION, WITH LONG-TERM CURRENT USE OF INSULIN (HCC): ICD-10-CM

## 2024-02-29 DIAGNOSIS — E55.9 VITAMIN D DEFICIENCY: ICD-10-CM

## 2024-02-29 DIAGNOSIS — E11.9 TYPE 2 DIABETES MELLITUS WITHOUT COMPLICATION, WITH LONG-TERM CURRENT USE OF INSULIN (HCC): ICD-10-CM

## 2024-03-01 RX ORDER — DULAGLUTIDE 3 MG/.5ML
3 INJECTION, SOLUTION SUBCUTANEOUS WEEKLY
Qty: 12 ADJUSTABLE DOSE PRE-FILLED PEN SYRINGE | Refills: 3 | Status: SHIPPED | OUTPATIENT
Start: 2024-03-01 | End: 2024-05-30

## 2024-03-01 RX ORDER — INSULIN GLARGINE 100 [IU]/ML
20 INJECTION, SOLUTION SUBCUTANEOUS 2 TIMES DAILY
Qty: 36 ML | Refills: 3 | Status: SHIPPED | OUTPATIENT
Start: 2024-03-01 | End: 2025-02-24

## 2024-03-01 RX ORDER — ERGOCALCIFEROL 1.25 MG/1
50000 CAPSULE ORAL WEEKLY
Qty: 12 CAPSULE | Refills: 3 | Status: SHIPPED | OUTPATIENT
Start: 2024-03-01

## 2024-03-01 NOTE — TELEPHONE ENCOUNTER
From: Yamini Barnhart  To: Dr. Matilda Calvillo  Sent: 2/29/2024 9:23 PM EST  Subject: Prescription Refill    Hi Dr. Calvillo,    Can you please send refills for my Lantus and 3.0 Trulicity. I’m out of the Trulicity.    Thank you,  Yamini Barnhart

## 2024-03-06 ENCOUNTER — PATIENT MESSAGE (OUTPATIENT)
Dept: PRIMARY CARE CLINIC | Age: 59
End: 2024-03-06

## 2024-03-12 RX ORDER — DULAGLUTIDE 3 MG/.5ML
3 INJECTION, SOLUTION SUBCUTANEOUS WEEKLY
Qty: 12 ADJUSTABLE DOSE PRE-FILLED PEN SYRINGE | Refills: 3 | OUTPATIENT
Start: 2024-03-12 | End: 2024-06-10

## 2024-03-13 NOTE — TELEPHONE ENCOUNTER
From: Yamini Barnhart  To: Dr. Matilda Calvillo  Sent: 3/6/2024 9:16 PM EST  Subject: Trulicity     Hi Dr. Calvillo,    I received a letter from [facility] that they can't fill my Trulicity because I need a PA. I attached a copy of the letter.    I apologize for the inconvenience, but I am out of that medication. Would you kindly take care of this for me.     Thank you,  Yamini Barnhart

## 2024-04-02 NOTE — TELEPHONE ENCOUNTER
Bill Mayfield attempted to call pt to find out how often she tests her blood sugars, we need that information for diabetic supplies. Phone call from pt's wife- states he is coming in for [facility] on 5/14/24 & scheduled for fasting labs on 5/7/24.  There are no lab orders placed in epic.  What labs would you like done?  Call patient with update.

## 2024-04-06 DIAGNOSIS — Z79.4 TYPE 2 DIABETES MELLITUS WITHOUT COMPLICATION, WITH LONG-TERM CURRENT USE OF INSULIN (HCC): ICD-10-CM

## 2024-04-06 DIAGNOSIS — E11.9 TYPE 2 DIABETES MELLITUS WITHOUT COMPLICATION, WITH LONG-TERM CURRENT USE OF INSULIN (HCC): ICD-10-CM

## 2024-04-17 DIAGNOSIS — Z79.4 TYPE 2 DIABETES MELLITUS WITHOUT COMPLICATION, WITH LONG-TERM CURRENT USE OF INSULIN (HCC): ICD-10-CM

## 2024-04-17 DIAGNOSIS — E11.9 TYPE 2 DIABETES MELLITUS WITHOUT COMPLICATION, WITHOUT LONG-TERM CURRENT USE OF INSULIN (HCC): ICD-10-CM

## 2024-04-17 DIAGNOSIS — E11.9 TYPE 2 DIABETES MELLITUS WITHOUT COMPLICATION, WITH LONG-TERM CURRENT USE OF INSULIN (HCC): ICD-10-CM

## 2024-04-18 RX ORDER — FLASH GLUCOSE SENSOR
1 KIT MISCELLANEOUS 3 TIMES DAILY
Qty: 2 EACH | Refills: 5 | Status: SHIPPED | OUTPATIENT
Start: 2024-04-18

## 2024-04-25 DIAGNOSIS — E66.01 TYPE 2 DIABETES MELLITUS WITH MORBID OBESITY (HCC): ICD-10-CM

## 2024-04-25 DIAGNOSIS — E11.9 TYPE 2 DIABETES MELLITUS WITHOUT COMPLICATION, WITH LONG-TERM CURRENT USE OF INSULIN (HCC): Primary | ICD-10-CM

## 2024-04-25 DIAGNOSIS — Z79.4 TYPE 2 DIABETES MELLITUS WITHOUT COMPLICATION, WITH LONG-TERM CURRENT USE OF INSULIN (HCC): Primary | ICD-10-CM

## 2024-04-25 DIAGNOSIS — E11.69 TYPE 2 DIABETES MELLITUS WITH MORBID OBESITY (HCC): ICD-10-CM

## 2024-04-25 DIAGNOSIS — E11.9 TYPE 2 DIABETES MELLITUS TREATED WITH INSULIN (HCC): ICD-10-CM

## 2024-04-25 DIAGNOSIS — Z79.4 TYPE 2 DIABETES MELLITUS TREATED WITH INSULIN (HCC): ICD-10-CM

## 2024-04-25 RX ORDER — DULAGLUTIDE 3 MG/.5ML
INJECTION, SOLUTION SUBCUTANEOUS
Refills: 3 | OUTPATIENT
Start: 2024-04-25

## 2024-04-25 RX ORDER — SEMAGLUTIDE 2.68 MG/ML
2 INJECTION, SOLUTION SUBCUTANEOUS
Qty: 9 ML | Refills: 0 | Status: SHIPPED | OUTPATIENT
Start: 2024-04-25 | End: 2024-07-24

## 2024-04-25 NOTE — TELEPHONE ENCOUNTER
Patient states she is not opposed to switching the medication as long as you are okay with that since she does not know what works best, and she does whatever you tell her to do.

## 2024-04-28 DIAGNOSIS — E11.9 TYPE 2 DIABETES MELLITUS WITHOUT COMPLICATION, WITH LONG-TERM CURRENT USE OF INSULIN (HCC): ICD-10-CM

## 2024-04-28 DIAGNOSIS — Z79.4 TYPE 2 DIABETES MELLITUS WITHOUT COMPLICATION, WITH LONG-TERM CURRENT USE OF INSULIN (HCC): ICD-10-CM

## 2024-04-29 RX ORDER — LISINOPRIL 10 MG/1
10 TABLET ORAL DAILY
Qty: 90 TABLET | Refills: 0 | Status: SHIPPED | OUTPATIENT
Start: 2024-04-29

## 2024-05-01 ENCOUNTER — PATIENT MESSAGE (OUTPATIENT)
Dept: PRIMARY CARE CLINIC | Age: 59
End: 2024-05-01

## 2024-05-01 DIAGNOSIS — M54.50 CHRONIC BILATERAL LOW BACK PAIN, UNSPECIFIED WHETHER SCIATICA PRESENT: Primary | ICD-10-CM

## 2024-05-01 DIAGNOSIS — G89.29 CHRONIC BILATERAL LOW BACK PAIN, UNSPECIFIED WHETHER SCIATICA PRESENT: Primary | ICD-10-CM

## 2024-05-01 NOTE — TELEPHONE ENCOUNTER
From: Yamini Barnhart  To: Dr. Matilda Calvillo  Sent: 5/1/2024 6:21 AM EDT  Subject: Physical Therapy     Hi Dr Calvillo,    May I please have a prescription for physical therapy. I need to go to [place] in [place].    Thank you,  Yamini Barnhart

## 2024-06-12 ENCOUNTER — OFFICE VISIT (OUTPATIENT)
Dept: PRIMARY CARE CLINIC | Age: 59
End: 2024-06-12
Payer: COMMERCIAL

## 2024-06-12 VITALS
BODY MASS INDEX: 51.91 KG/M2 | OXYGEN SATURATION: 97 % | WEIGHT: 293 LBS | SYSTOLIC BLOOD PRESSURE: 116 MMHG | HEIGHT: 63 IN | HEART RATE: 92 BPM | DIASTOLIC BLOOD PRESSURE: 86 MMHG

## 2024-06-12 DIAGNOSIS — E11.69 TYPE 2 DIABETES MELLITUS WITH MORBID OBESITY (HCC): Primary | ICD-10-CM

## 2024-06-12 DIAGNOSIS — I10 ESSENTIAL HYPERTENSION: ICD-10-CM

## 2024-06-12 DIAGNOSIS — Z12.11 COLON CANCER SCREENING: ICD-10-CM

## 2024-06-12 DIAGNOSIS — E66.01 TYPE 2 DIABETES MELLITUS WITH MORBID OBESITY (HCC): Primary | ICD-10-CM

## 2024-06-12 DIAGNOSIS — E11.9 TYPE 2 DIABETES MELLITUS TREATED WITH INSULIN (HCC): ICD-10-CM

## 2024-06-12 DIAGNOSIS — Z23 IMMUNIZATION DUE: ICD-10-CM

## 2024-06-12 DIAGNOSIS — Z12.31 BREAST CANCER SCREENING BY MAMMOGRAM: ICD-10-CM

## 2024-06-12 DIAGNOSIS — Z79.4 TYPE 2 DIABETES MELLITUS TREATED WITH INSULIN (HCC): ICD-10-CM

## 2024-06-12 LAB — HBA1C MFR BLD: 6.8 %

## 2024-06-12 PROCEDURE — 90471 IMMUNIZATION ADMIN: CPT | Performed by: FAMILY MEDICINE

## 2024-06-12 PROCEDURE — 99214 OFFICE O/P EST MOD 30 MIN: CPT | Performed by: FAMILY MEDICINE

## 2024-06-12 PROCEDURE — 83036 HEMOGLOBIN GLYCOSYLATED A1C: CPT | Performed by: FAMILY MEDICINE

## 2024-06-12 PROCEDURE — 3074F SYST BP LT 130 MM HG: CPT | Performed by: FAMILY MEDICINE

## 2024-06-12 PROCEDURE — 1036F TOBACCO NON-USER: CPT | Performed by: FAMILY MEDICINE

## 2024-06-12 PROCEDURE — 3017F COLORECTAL CA SCREEN DOC REV: CPT | Performed by: FAMILY MEDICINE

## 2024-06-12 PROCEDURE — 3079F DIAST BP 80-89 MM HG: CPT | Performed by: FAMILY MEDICINE

## 2024-06-12 PROCEDURE — G8427 DOCREV CUR MEDS BY ELIG CLIN: HCPCS | Performed by: FAMILY MEDICINE

## 2024-06-12 PROCEDURE — G8417 CALC BMI ABV UP PARAM F/U: HCPCS | Performed by: FAMILY MEDICINE

## 2024-06-12 PROCEDURE — 90746 HEPB VACCINE 3 DOSE ADULT IM: CPT | Performed by: FAMILY MEDICINE

## 2024-06-12 PROCEDURE — 90472 IMMUNIZATION ADMIN EACH ADD: CPT | Performed by: FAMILY MEDICINE

## 2024-06-12 PROCEDURE — 90677 PCV20 VACCINE IM: CPT | Performed by: FAMILY MEDICINE

## 2024-06-12 PROCEDURE — 2022F DILAT RTA XM EVC RTNOPTHY: CPT | Performed by: FAMILY MEDICINE

## 2024-06-12 PROCEDURE — 3044F HG A1C LEVEL LT 7.0%: CPT | Performed by: FAMILY MEDICINE

## 2024-06-12 ASSESSMENT — ENCOUNTER SYMPTOMS: BACK PAIN: 1

## 2024-06-12 NOTE — PROGRESS NOTES
Standing Expiration Date:   6/12/2025     Order Specific Question:   Reason for exam:     Answer:   screening    Pneumococcal, PCV20, PREVNAR 20, (age 6w+), IM, PF    Hep B, ENGERIX-B, (age 20 yrs+), IM, 1mL, 3-dose    Comprehensive Metabolic Panel, Fasting     Standing Status:   Future     Standing Expiration Date:   6/12/2025    Microalbumin, Ur     Standing Status:   Future     Standing Expiration Date:   6/12/2025    Lipid Panel     Standing Status:   Future     Standing Expiration Date:   6/12/2025     Order Specific Question:   Is Patient Fasting?/# of Hours     Answer:   yes    POCT glycosylated hemoglobin (Hb A1C)     No orders of the defined types were placed in this encounter.      Patient given educationalmaterials - see patient instructions.  Discussed use, benefit, and side effectsof prescribed medications.  All patient questions answered. Pt voiced understanding.Reviewed health maintenance.  Instructed to continue current medications, diet andexercise.  Patient agreed with treatment plan. Follow up as directed.     Electronicallysigned by Matilda Calvillo MD on 6/12/2024 at 11:07 AM

## 2024-06-27 DIAGNOSIS — E11.9 TYPE 2 DIABETES MELLITUS WITHOUT COMPLICATION, WITH LONG-TERM CURRENT USE OF INSULIN (HCC): ICD-10-CM

## 2024-06-27 DIAGNOSIS — Z79.4 TYPE 2 DIABETES MELLITUS WITHOUT COMPLICATION, WITH LONG-TERM CURRENT USE OF INSULIN (HCC): ICD-10-CM

## 2024-06-28 RX ORDER — INSULIN GLARGINE 100 [IU]/ML
INJECTION, SOLUTION SUBCUTANEOUS
Qty: 30 ML | Refills: 3 | Status: SHIPPED | OUTPATIENT
Start: 2024-06-28

## 2024-07-11 ENCOUNTER — TELEPHONE (OUTPATIENT)
Dept: PULMONOLOGY | Age: 59
End: 2024-07-11

## 2024-07-11 DIAGNOSIS — G47.33 OSA TREATED WITH BIPAP: Primary | ICD-10-CM

## 2024-07-27 DIAGNOSIS — E11.9 TYPE 2 DIABETES MELLITUS WITHOUT COMPLICATION, WITH LONG-TERM CURRENT USE OF INSULIN (HCC): ICD-10-CM

## 2024-07-27 DIAGNOSIS — Z79.4 TYPE 2 DIABETES MELLITUS WITHOUT COMPLICATION, WITH LONG-TERM CURRENT USE OF INSULIN (HCC): ICD-10-CM

## 2024-07-27 DIAGNOSIS — I10 ESSENTIAL HYPERTENSION: ICD-10-CM

## 2024-07-29 RX ORDER — LISINOPRIL 10 MG/1
10 TABLET ORAL DAILY
Qty: 90 TABLET | Refills: 0 | Status: SHIPPED | OUTPATIENT
Start: 2024-07-29

## 2024-07-29 RX ORDER — TRIAMTERENE AND HYDROCHLOROTHIAZIDE 75; 50 MG/1; MG/1
TABLET ORAL
Qty: 90 TABLET | Refills: 0 | Status: SHIPPED | OUTPATIENT
Start: 2024-07-29

## 2024-08-06 ENCOUNTER — PATIENT MESSAGE (OUTPATIENT)
Dept: PRIMARY CARE CLINIC | Age: 59
End: 2024-08-06

## 2024-08-06 DIAGNOSIS — E11.65 TYPE 2 DIABETES MELLITUS WITH HYPERGLYCEMIA (HCC): ICD-10-CM

## 2024-08-07 NOTE — TELEPHONE ENCOUNTER
Ingris oMntiel MA 8/7/2024 7:51 AM EDT      ----- Message -----  From: Yamini Barnhart  Sent: 8/6/2024 5:29 PM EDT  To: Driss Patel Clinical Staff  Subject: Diabetes Management     Hi Dr Calvillo,    I’ve been on Ozempic over 6 weeks now. I’ve been gaining weight despite actively tracking and exercising. I was also experiencing lows and had to eat more than I would have normally including at night which could account for some of the weight gain. Although, I’m trying to be mindful of that. To curb this I cut my insulin from 40 units to 20 units. I’ve all but eliminated bread, pasta (anything high glycemic). I’m consuming less than 30-40 carbs a day. Now my glucose are in the 180-112 range. I prefer the 100-120 because I feel best. I don’t know if my weight will start to decrease or not.     I reduced insulin to cut out lows and hopefully get my weight loss to happen again. But, I’m concerned what to do if I have high or accidentally consume some “hidden” sugar. Which has happened to me when dining out before. Am I doing the right thing? Should I come in for appointment?     Please let me know. I appreciate your guidance with my diabetic management. I’m really trying to manage this disease as best possible.    Thank you,  Yamini Barnhart

## 2024-08-30 DIAGNOSIS — E66.01 TYPE 2 DIABETES MELLITUS WITH MORBID OBESITY (HCC): ICD-10-CM

## 2024-08-30 DIAGNOSIS — E11.69 TYPE 2 DIABETES MELLITUS WITH MORBID OBESITY (HCC): ICD-10-CM

## 2024-08-30 DIAGNOSIS — E11.9 TYPE 2 DIABETES MELLITUS WITHOUT COMPLICATION, WITH LONG-TERM CURRENT USE OF INSULIN (HCC): ICD-10-CM

## 2024-08-30 DIAGNOSIS — Z79.4 TYPE 2 DIABETES MELLITUS WITHOUT COMPLICATION, WITH LONG-TERM CURRENT USE OF INSULIN (HCC): ICD-10-CM

## 2024-08-30 DIAGNOSIS — E11.9 TYPE 2 DIABETES MELLITUS TREATED WITH INSULIN (HCC): ICD-10-CM

## 2024-08-30 DIAGNOSIS — Z79.4 TYPE 2 DIABETES MELLITUS TREATED WITH INSULIN (HCC): ICD-10-CM

## 2024-09-03 RX ORDER — SEMAGLUTIDE 2.68 MG/ML
INJECTION, SOLUTION SUBCUTANEOUS
Qty: 9 ML | Refills: 0 | Status: SHIPPED | OUTPATIENT
Start: 2024-09-03

## 2024-09-04 ENCOUNTER — TELEPHONE (OUTPATIENT)
Dept: PRIMARY CARE CLINIC | Age: 59
End: 2024-09-04

## 2024-09-04 RX ORDER — SEMAGLUTIDE 2.68 MG/ML
2 INJECTION, SOLUTION SUBCUTANEOUS
Qty: 9 ML | Refills: 0 | OUTPATIENT
Start: 2024-09-04 | End: 2024-12-03

## 2024-09-12 ENCOUNTER — OFFICE VISIT (OUTPATIENT)
Dept: PRIMARY CARE CLINIC | Age: 59
End: 2024-09-12

## 2024-09-12 VITALS
HEIGHT: 63 IN | OXYGEN SATURATION: 97 % | BODY MASS INDEX: 51.91 KG/M2 | SYSTOLIC BLOOD PRESSURE: 116 MMHG | HEART RATE: 113 BPM | WEIGHT: 293 LBS | DIASTOLIC BLOOD PRESSURE: 80 MMHG

## 2024-09-12 DIAGNOSIS — Z79.4 TYPE 2 DIABETES MELLITUS WITHOUT COMPLICATION, WITH LONG-TERM CURRENT USE OF INSULIN (HCC): ICD-10-CM

## 2024-09-12 DIAGNOSIS — E11.9 TYPE 2 DIABETES MELLITUS WITHOUT COMPLICATION, WITH LONG-TERM CURRENT USE OF INSULIN (HCC): ICD-10-CM

## 2024-09-12 DIAGNOSIS — Z79.4 TYPE 2 DIABETES MELLITUS TREATED WITH INSULIN (HCC): Primary | ICD-10-CM

## 2024-09-12 DIAGNOSIS — E66.01 TYPE 2 DIABETES MELLITUS WITH MORBID OBESITY (HCC): ICD-10-CM

## 2024-09-12 DIAGNOSIS — Z12.11 COLON CANCER SCREENING: ICD-10-CM

## 2024-09-12 DIAGNOSIS — R09.81 NASAL CONGESTION: ICD-10-CM

## 2024-09-12 DIAGNOSIS — J01.90 SUBACUTE SINUSITIS, UNSPECIFIED LOCATION: ICD-10-CM

## 2024-09-12 DIAGNOSIS — Z23 IMMUNIZATION DUE: ICD-10-CM

## 2024-09-12 DIAGNOSIS — E11.9 TYPE 2 DIABETES MELLITUS TREATED WITH INSULIN (HCC): Primary | ICD-10-CM

## 2024-09-12 DIAGNOSIS — E11.69 TYPE 2 DIABETES MELLITUS WITH MORBID OBESITY (HCC): ICD-10-CM

## 2024-09-12 LAB
ALBUMIN/GLOBULIN RATIO: 1 (ref 1–2.5)
ALBUMIN: 4.2 G/DL (ref 3.5–5.2)
ALP BLD-CCNC: 101 U/L (ref 35–104)
ALT SERPL-CCNC: 24 U/L (ref 10–35)
ANION GAP SERPL CALCULATED.3IONS-SCNC: 14 MMOL/L (ref 9–16)
AST SERPL-CCNC: 28 U/L (ref 10–35)
BILIRUB SERPL-MCNC: 0.2 MG/DL (ref 0–1.2)
BUN BLDV-MCNC: 25 MG/DL (ref 6–20)
CALCIUM SERPL-MCNC: 9 MG/DL (ref 8.6–10.4)
CHLORIDE BLD-SCNC: 98 MMOL/L (ref 98–107)
CHOLESTEROL, TOTAL: 227 MG/DL (ref 0–199)
CHOLESTEROL/HDL RATIO: 4
CO2: 20 MMOL/L (ref 20–31)
CREAT SERPL-MCNC: 1 MG/DL (ref 0.5–0.9)
CREATININE URINE: 30.9 MG/DL (ref 28–217)
GFR, ESTIMATED: 67 ML/MIN/1.73M2
GLUCOSE FASTING: 136 MG/DL (ref 74–99)
HBA1C MFR BLD: 6.9 %
HDLC SERPL-MCNC: 52 MG/DL
LDL CHOLESTEROL: 126 MG/DL (ref 0–100)
MICROALBUMIN/CREAT 24H UR: <12 MG/L (ref 0–20)
MICROALBUMIN/CREAT UR-RTO: NORMAL MCG/MG CREAT (ref 0–25)
POTASSIUM SERPL-SCNC: 4.8 MMOL/L (ref 3.7–5.3)
SODIUM BLD-SCNC: 132 MMOL/L (ref 136–145)
TOTAL PROTEIN: 7.5 G/DL (ref 6.6–8.7)
TRIGL SERPL-MCNC: 242 MG/DL
VLDLC SERPL CALC-MCNC: 48 MG/DL

## 2024-09-12 RX ORDER — SULFAMETHOXAZOLE/TRIMETHOPRIM 800-160 MG
1 TABLET ORAL 2 TIMES DAILY
Qty: 28 TABLET | Refills: 0 | Status: SHIPPED | OUTPATIENT
Start: 2024-09-12 | End: 2024-09-26

## 2024-09-12 RX ORDER — FLUTICASONE PROPIONATE 50 MCG
2 SPRAY, SUSPENSION (ML) NASAL DAILY
Qty: 32 G | Refills: 1 | Status: SHIPPED | OUTPATIENT
Start: 2024-09-12

## 2024-09-12 ASSESSMENT — ENCOUNTER SYMPTOMS: BACK PAIN: 1

## 2024-09-29 DIAGNOSIS — E11.9 TYPE 2 DIABETES MELLITUS WITHOUT COMPLICATION, WITHOUT LONG-TERM CURRENT USE OF INSULIN (HCC): ICD-10-CM

## 2024-09-29 DIAGNOSIS — Z79.4 TYPE 2 DIABETES MELLITUS WITHOUT COMPLICATION, WITH LONG-TERM CURRENT USE OF INSULIN (HCC): ICD-10-CM

## 2024-09-29 DIAGNOSIS — E11.9 TYPE 2 DIABETES MELLITUS WITHOUT COMPLICATION, WITH LONG-TERM CURRENT USE OF INSULIN (HCC): ICD-10-CM

## 2024-10-02 DIAGNOSIS — E11.9 TYPE 2 DIABETES MELLITUS WITHOUT COMPLICATION, WITH LONG-TERM CURRENT USE OF INSULIN (HCC): ICD-10-CM

## 2024-10-02 DIAGNOSIS — Z79.4 TYPE 2 DIABETES MELLITUS WITHOUT COMPLICATION, WITH LONG-TERM CURRENT USE OF INSULIN (HCC): ICD-10-CM

## 2024-10-18 DIAGNOSIS — Z79.4 TYPE 2 DIABETES MELLITUS WITHOUT COMPLICATION, WITH LONG-TERM CURRENT USE OF INSULIN (HCC): ICD-10-CM

## 2024-10-18 DIAGNOSIS — I10 ESSENTIAL HYPERTENSION: ICD-10-CM

## 2024-10-18 DIAGNOSIS — E11.9 TYPE 2 DIABETES MELLITUS WITHOUT COMPLICATION, WITH LONG-TERM CURRENT USE OF INSULIN (HCC): ICD-10-CM

## 2024-10-18 RX ORDER — TRIAMTERENE AND HYDROCHLOROTHIAZIDE 75; 50 MG/1; MG/1
TABLET ORAL
Qty: 90 TABLET | Refills: 0 | Status: SHIPPED | OUTPATIENT
Start: 2024-10-18

## 2024-10-18 RX ORDER — LISINOPRIL 10 MG/1
10 TABLET ORAL DAILY
Qty: 90 TABLET | Refills: 0 | Status: SHIPPED | OUTPATIENT
Start: 2024-10-18

## 2024-10-21 ENCOUNTER — TELEPHONE (OUTPATIENT)
Dept: PULMONOLOGY | Age: 59
End: 2024-10-21

## 2024-10-21 DIAGNOSIS — G47.33 OSA TREATED WITH BIPAP: Primary | ICD-10-CM

## 2024-10-29 ENCOUNTER — PATIENT MESSAGE (OUTPATIENT)
Dept: PRIMARY CARE CLINIC | Age: 59
End: 2024-10-29

## 2024-10-29 DIAGNOSIS — Z79.4 TYPE 2 DIABETES MELLITUS TREATED WITH INSULIN (HCC): ICD-10-CM

## 2024-10-29 DIAGNOSIS — E11.9 TYPE 2 DIABETES MELLITUS WITHOUT COMPLICATION, WITH LONG-TERM CURRENT USE OF INSULIN (HCC): ICD-10-CM

## 2024-10-29 DIAGNOSIS — J01.90 SUBACUTE SINUSITIS, UNSPECIFIED LOCATION: ICD-10-CM

## 2024-10-29 DIAGNOSIS — E11.9 TYPE 2 DIABETES MELLITUS TREATED WITH INSULIN (HCC): ICD-10-CM

## 2024-10-29 DIAGNOSIS — E11.9 TYPE 2 DIABETES MELLITUS WITHOUT COMPLICATION, WITHOUT LONG-TERM CURRENT USE OF INSULIN (HCC): ICD-10-CM

## 2024-10-29 DIAGNOSIS — E11.69 TYPE 2 DIABETES MELLITUS WITH MORBID OBESITY (HCC): ICD-10-CM

## 2024-10-29 DIAGNOSIS — E55.9 VITAMIN D DEFICIENCY: ICD-10-CM

## 2024-10-29 DIAGNOSIS — I10 ESSENTIAL HYPERTENSION: ICD-10-CM

## 2024-10-29 DIAGNOSIS — E66.01 TYPE 2 DIABETES MELLITUS WITH MORBID OBESITY (HCC): ICD-10-CM

## 2024-10-29 DIAGNOSIS — Z79.4 TYPE 2 DIABETES MELLITUS WITHOUT COMPLICATION, WITH LONG-TERM CURRENT USE OF INSULIN (HCC): ICD-10-CM

## 2024-10-29 DIAGNOSIS — R09.81 NASAL CONGESTION: ICD-10-CM

## 2024-10-29 RX ORDER — INSULIN GLARGINE 100 [IU]/ML
20 INJECTION, SOLUTION SUBCUTANEOUS 2 TIMES DAILY
Qty: 30 ML | Refills: 3 | Status: SHIPPED | OUTPATIENT
Start: 2024-10-29

## 2024-10-29 RX ORDER — LISINOPRIL 10 MG/1
10 TABLET ORAL DAILY
Qty: 90 TABLET | Refills: 0 | OUTPATIENT
Start: 2024-10-29

## 2024-10-29 RX ORDER — ERGOCALCIFEROL 1.25 MG/1
50000 CAPSULE, LIQUID FILLED ORAL WEEKLY
Qty: 12 CAPSULE | Refills: 3 | Status: SHIPPED | OUTPATIENT
Start: 2024-10-29

## 2024-10-29 RX ORDER — PEN NEEDLE, DIABETIC 31 GX5/16"
1 NEEDLE, DISPOSABLE MISCELLANEOUS 2 TIMES DAILY
Qty: 200 EACH | Refills: 3 | Status: SHIPPED | OUTPATIENT
Start: 2024-10-29

## 2024-10-29 RX ORDER — FLUTICASONE PROPIONATE 50 MCG
2 SPRAY, SUSPENSION (ML) NASAL DAILY
Qty: 32 G | Refills: 1 | Status: SHIPPED | OUTPATIENT
Start: 2024-10-29

## 2024-10-29 RX ORDER — TRIAMTERENE AND HYDROCHLOROTHIAZIDE 75; 50 MG/1; MG/1
TABLET ORAL
Qty: 90 TABLET | Refills: 0 | OUTPATIENT
Start: 2024-10-29

## 2024-10-29 RX ORDER — SEMAGLUTIDE 2.68 MG/ML
2 INJECTION, SOLUTION SUBCUTANEOUS
Qty: 9 ML | Refills: 0 | Status: SHIPPED | OUTPATIENT
Start: 2024-10-29

## 2024-10-29 NOTE — TELEPHONE ENCOUNTER
LAST VISIT:   9/12/2024     Future Appointments   Date Time Provider Department Center   12/12/2024  9:00 AM SCHEDULE, [facility] DE[facility]   12/27/2024  8:40 AM Matilda Calvillo MD [facility] DEP   1/27/2025 10:00 AM Orquidea Escobar MD Resp Spec [id number]       Pharmacy verified? Yes     [facility] - [place] - [facility] - P [phone] - F [phone]  [facility]  [place] [address]  Phone: [phone] Fax: [phone]

## 2024-11-09 DIAGNOSIS — Z79.4 TYPE 2 DIABETES MELLITUS TREATED WITH INSULIN (HCC): ICD-10-CM

## 2024-11-09 DIAGNOSIS — E11.69 TYPE 2 DIABETES MELLITUS WITH MORBID OBESITY (HCC): ICD-10-CM

## 2024-11-09 DIAGNOSIS — E11.9 TYPE 2 DIABETES MELLITUS TREATED WITH INSULIN (HCC): ICD-10-CM

## 2024-11-09 DIAGNOSIS — E66.01 TYPE 2 DIABETES MELLITUS WITH MORBID OBESITY (HCC): ICD-10-CM

## 2024-11-09 DIAGNOSIS — Z79.4 TYPE 2 DIABETES MELLITUS WITHOUT COMPLICATION, WITH LONG-TERM CURRENT USE OF INSULIN (HCC): ICD-10-CM

## 2024-11-09 DIAGNOSIS — E11.9 TYPE 2 DIABETES MELLITUS WITHOUT COMPLICATION, WITH LONG-TERM CURRENT USE OF INSULIN (HCC): ICD-10-CM

## 2024-11-11 RX ORDER — SEMAGLUTIDE 2.68 MG/ML
INJECTION, SOLUTION SUBCUTANEOUS
Qty: 9 ML | Refills: 0 | OUTPATIENT
Start: 2024-11-11

## 2024-12-12 ENCOUNTER — TELEPHONE (OUTPATIENT)
Dept: PRIMARY CARE CLINIC | Age: 59
End: 2024-12-12

## 2024-12-12 ENCOUNTER — NURSE ONLY (OUTPATIENT)
Dept: PRIMARY CARE CLINIC | Age: 59
End: 2024-12-12
Payer: COMMERCIAL

## 2024-12-12 VITALS
OXYGEN SATURATION: 97 % | HEIGHT: 62 IN | BODY MASS INDEX: 53.92 KG/M2 | WEIGHT: 293 LBS | SYSTOLIC BLOOD PRESSURE: 128 MMHG | HEART RATE: 94 BPM | DIASTOLIC BLOOD PRESSURE: 70 MMHG

## 2024-12-12 DIAGNOSIS — Z23 NEED FOR VACCINATION: Primary | ICD-10-CM

## 2024-12-12 PROCEDURE — 90471 IMMUNIZATION ADMIN: CPT | Performed by: PHYSICIAN ASSISTANT

## 2024-12-12 PROCEDURE — 90746 HEPB VACCINE 3 DOSE ADULT IM: CPT | Performed by: PHYSICIAN ASSISTANT

## 2024-12-12 PROCEDURE — 90472 IMMUNIZATION ADMIN EACH ADD: CPT | Performed by: PHYSICIAN ASSISTANT

## 2024-12-12 PROCEDURE — 90750 HZV VACC RECOMBINANT IM: CPT | Performed by: PHYSICIAN ASSISTANT

## 2024-12-12 SDOH — ECONOMIC STABILITY: FOOD INSECURITY: WITHIN THE PAST 12 MONTHS, YOU WORRIED THAT YOUR FOOD WOULD RUN OUT BEFORE YOU GOT MONEY TO BUY MORE.: PATIENT DECLINED

## 2024-12-12 SDOH — ECONOMIC STABILITY: FOOD INSECURITY: WITHIN THE PAST 12 MONTHS, THE FOOD YOU BOUGHT JUST DIDN'T LAST AND YOU DIDN'T HAVE MONEY TO GET MORE.: PATIENT DECLINED

## 2024-12-12 SDOH — ECONOMIC STABILITY: INCOME INSECURITY: HOW HARD IS IT FOR YOU TO PAY FOR THE VERY BASICS LIKE FOOD, HOUSING, MEDICAL CARE, AND HEATING?: PATIENT DECLINED

## 2024-12-12 ASSESSMENT — PATIENT HEALTH QUESTIONNAIRE - PHQ9
SUM OF ALL RESPONSES TO PHQ QUESTIONS 1-9: 0
SUM OF ALL RESPONSES TO PHQ9 QUESTIONS 1 & 2: 0
DEPRESSION UNABLE TO ASSESS: FUNCTIONAL CAPACITY MOTIVATION LIMITS ACCURACY
SUM OF ALL RESPONSES TO PHQ QUESTIONS 1-9: 0
2. FEELING DOWN, DEPRESSED OR HOPELESS: NOT AT ALL
SUM OF ALL RESPONSES TO PHQ QUESTIONS 1-9: 0
1. LITTLE INTEREST OR PLEASURE IN DOING THINGS: NOT AT ALL
SUM OF ALL RESPONSES TO PHQ QUESTIONS 1-9: 0

## 2024-12-12 NOTE — PROGRESS NOTES
After obtaining consent, and per orders of Abel Schneider PA-C, injection of Hep B given in Left deltoid and injection of  Shingles given in Right deltoid by Kathy Ho MA. Patient handled the injection well.

## 2024-12-24 SDOH — ECONOMIC STABILITY: INCOME INSECURITY: HOW HARD IS IT FOR YOU TO PAY FOR THE VERY BASICS LIKE FOOD, HOUSING, MEDICAL CARE, AND HEATING?: NOT VERY HARD

## 2024-12-24 SDOH — ECONOMIC STABILITY: FOOD INSECURITY: WITHIN THE PAST 12 MONTHS, YOU WORRIED THAT YOUR FOOD WOULD RUN OUT BEFORE YOU GOT MONEY TO BUY MORE.: NEVER TRUE

## 2024-12-24 SDOH — ECONOMIC STABILITY: FOOD INSECURITY: WITHIN THE PAST 12 MONTHS, THE FOOD YOU BOUGHT JUST DIDN'T LAST AND YOU DIDN'T HAVE MONEY TO GET MORE.: NEVER TRUE

## 2024-12-27 ENCOUNTER — OFFICE VISIT (OUTPATIENT)
Dept: PRIMARY CARE CLINIC | Age: 59
End: 2024-12-27
Payer: COMMERCIAL

## 2024-12-27 VITALS
DIASTOLIC BLOOD PRESSURE: 74 MMHG | SYSTOLIC BLOOD PRESSURE: 122 MMHG | WEIGHT: 293 LBS | HEIGHT: 62 IN | BODY MASS INDEX: 53.92 KG/M2 | HEART RATE: 90 BPM | OXYGEN SATURATION: 98 %

## 2024-12-27 DIAGNOSIS — E11.69 TYPE 2 DIABETES MELLITUS WITH MORBID OBESITY (HCC): ICD-10-CM

## 2024-12-27 DIAGNOSIS — E11.9 TYPE 2 DIABETES MELLITUS TREATED WITH INSULIN (HCC): Primary | ICD-10-CM

## 2024-12-27 DIAGNOSIS — E66.01 TYPE 2 DIABETES MELLITUS WITH MORBID OBESITY (HCC): ICD-10-CM

## 2024-12-27 DIAGNOSIS — E87.1 HYPONATREMIA: ICD-10-CM

## 2024-12-27 DIAGNOSIS — Z79.4 TYPE 2 DIABETES MELLITUS TREATED WITH INSULIN (HCC): Primary | ICD-10-CM

## 2024-12-27 DIAGNOSIS — I10 ESSENTIAL HYPERTENSION: ICD-10-CM

## 2024-12-27 DIAGNOSIS — Z79.4 TYPE 2 DIABETES MELLITUS WITHOUT COMPLICATION, WITH LONG-TERM CURRENT USE OF INSULIN (HCC): ICD-10-CM

## 2024-12-27 DIAGNOSIS — E11.9 TYPE 2 DIABETES MELLITUS WITHOUT COMPLICATION, WITH LONG-TERM CURRENT USE OF INSULIN (HCC): ICD-10-CM

## 2024-12-27 LAB — HBA1C MFR BLD: 6.5 %

## 2024-12-27 PROCEDURE — 3078F DIAST BP <80 MM HG: CPT | Performed by: FAMILY MEDICINE

## 2024-12-27 PROCEDURE — 3044F HG A1C LEVEL LT 7.0%: CPT | Performed by: FAMILY MEDICINE

## 2024-12-27 PROCEDURE — 3074F SYST BP LT 130 MM HG: CPT | Performed by: FAMILY MEDICINE

## 2024-12-27 PROCEDURE — G8427 DOCREV CUR MEDS BY ELIG CLIN: HCPCS | Performed by: FAMILY MEDICINE

## 2024-12-27 PROCEDURE — G8417 CALC BMI ABV UP PARAM F/U: HCPCS | Performed by: FAMILY MEDICINE

## 2024-12-27 PROCEDURE — 1036F TOBACCO NON-USER: CPT | Performed by: FAMILY MEDICINE

## 2024-12-27 PROCEDURE — 3017F COLORECTAL CA SCREEN DOC REV: CPT | Performed by: FAMILY MEDICINE

## 2024-12-27 PROCEDURE — 2022F DILAT RTA XM EVC RTNOPTHY: CPT | Performed by: FAMILY MEDICINE

## 2024-12-27 PROCEDURE — 99214 OFFICE O/P EST MOD 30 MIN: CPT | Performed by: FAMILY MEDICINE

## 2024-12-27 PROCEDURE — 83036 HEMOGLOBIN GLYCOSYLATED A1C: CPT | Performed by: FAMILY MEDICINE

## 2024-12-27 PROCEDURE — G8484 FLU IMMUNIZE NO ADMIN: HCPCS | Performed by: FAMILY MEDICINE

## 2024-12-27 RX ORDER — SEMAGLUTIDE 2.68 MG/ML
2 INJECTION, SOLUTION SUBCUTANEOUS
Qty: 9 ML | Refills: 0 | Status: SHIPPED | OUTPATIENT
Start: 2024-12-27

## 2024-12-27 RX ORDER — TRIAMTERENE AND HYDROCHLOROTHIAZIDE 75; 50 MG/1; MG/1
TABLET ORAL
Qty: 90 TABLET | Refills: 2 | Status: SHIPPED | OUTPATIENT
Start: 2024-12-27

## 2024-12-27 RX ORDER — INSULIN GLARGINE 100 [IU]/ML
20 INJECTION, SOLUTION SUBCUTANEOUS NIGHTLY
Qty: 18 ML | Refills: 1
Start: 2024-12-27 | End: 2025-06-25

## 2024-12-27 RX ORDER — LISINOPRIL 10 MG/1
10 TABLET ORAL DAILY
Qty: 90 TABLET | Refills: 2 | Status: SHIPPED | OUTPATIENT
Start: 2024-12-27

## 2024-12-27 ASSESSMENT — ENCOUNTER SYMPTOMS: RESPIRATORY NEGATIVE: 1

## 2024-12-27 NOTE — PROGRESS NOTES
Creatinine     Standing Status:   Future     Standing Expiration Date:   12/27/2025    POCT glycosylated hemoglobin (Hb A1C)     Orders Placed This Encounter   Medications    metFORMIN (GLUCOPHAGE) 500 MG tablet     Sig: TAKE 1 TABLET TWICE DAILY  WITH MEALS     Dispense:  180 tablet     Refill:  2    semaglutide, 2 MG/DOSE, (OZEMPIC, 2 MG/DOSE,) 8 MG/3ML SOPN sc injection     Sig: Inject 2 mg into the skin every 7 days     Dispense:  9 mL     Refill:  0    lisinopril (PRINIVIL;ZESTRIL) 10 MG tablet     Sig: Take 1 tablet by mouth daily     Dispense:  90 tablet     Refill:  2    triamterene-hydroCHLOROthiazide (MAXZIDE) 75-50 MG per tablet     Sig: TAKE 1 TABLET DAILY     Dispense:  90 tablet     Refill:  2    Continuous Glucose Sensor (FREESTYLE LUIS ANGEL 2 SENSOR) MISC     Sig: Change sensor every 14 days     Dispense:  2 each     Refill:  5    insulin glargine (LANTUS SOLOSTAR) 100 UNIT/ML injection pen     Sig: Inject 20 Units into the skin nightly     Dispense:  18 mL     Refill:  1       Patient given educationalmaterials - see patient instructions.  Discussed use, benefit, and side effectsof prescribed medications.  All patient questions answered. Pt voiced understanding.Reviewed health maintenance.  Instructed to continue current medications, diet andexercise.  Patient agreed with treatment plan. Follow up as directed.     Electronicallysigned by Matilda Calvillo MD on 12/27/2024 at 10:35 AM

## 2024-12-28 DIAGNOSIS — J01.90 SUBACUTE SINUSITIS, UNSPECIFIED LOCATION: ICD-10-CM

## 2024-12-28 DIAGNOSIS — R09.81 NASAL CONGESTION: ICD-10-CM

## 2024-12-30 RX ORDER — FLUTICASONE PROPIONATE 50 MCG
2 SPRAY, SUSPENSION (ML) NASAL DAILY
Qty: 32 G | Refills: 1 | Status: SHIPPED | OUTPATIENT
Start: 2024-12-30

## 2025-01-27 ENCOUNTER — OFFICE VISIT (OUTPATIENT)
Dept: PULMONOLOGY | Age: 60
End: 2025-01-27
Payer: COMMERCIAL

## 2025-01-27 VITALS
HEIGHT: 62 IN | OXYGEN SATURATION: 98 % | SYSTOLIC BLOOD PRESSURE: 130 MMHG | WEIGHT: 293 LBS | BODY MASS INDEX: 53.92 KG/M2 | HEART RATE: 91 BPM | DIASTOLIC BLOOD PRESSURE: 83 MMHG | RESPIRATION RATE: 18 BRPM

## 2025-01-27 DIAGNOSIS — E66.01 CLASS 3 SEVERE OBESITY DUE TO EXCESS CALORIES WITH SERIOUS COMORBIDITY AND BODY MASS INDEX (BMI) OF 50.0 TO 59.9 IN ADULT: ICD-10-CM

## 2025-01-27 DIAGNOSIS — E66.813 CLASS 3 SEVERE OBESITY DUE TO EXCESS CALORIES WITH SERIOUS COMORBIDITY AND BODY MASS INDEX (BMI) OF 50.0 TO 59.9 IN ADULT: ICD-10-CM

## 2025-01-27 DIAGNOSIS — E11.9 TYPE 2 DIABETES MELLITUS WITHOUT COMPLICATION, WITH LONG-TERM CURRENT USE OF INSULIN (HCC): ICD-10-CM

## 2025-01-27 DIAGNOSIS — G47.33 OSA (OBSTRUCTIVE SLEEP APNEA): Primary | ICD-10-CM

## 2025-01-27 DIAGNOSIS — Z79.4 TYPE 2 DIABETES MELLITUS WITHOUT COMPLICATION, WITH LONG-TERM CURRENT USE OF INSULIN (HCC): ICD-10-CM

## 2025-01-27 PROCEDURE — 2022F DILAT RTA XM EVC RTNOPTHY: CPT | Performed by: STUDENT IN AN ORGANIZED HEALTH CARE EDUCATION/TRAINING PROGRAM

## 2025-01-27 PROCEDURE — 3046F HEMOGLOBIN A1C LEVEL >9.0%: CPT | Performed by: STUDENT IN AN ORGANIZED HEALTH CARE EDUCATION/TRAINING PROGRAM

## 2025-01-27 PROCEDURE — G8427 DOCREV CUR MEDS BY ELIG CLIN: HCPCS | Performed by: STUDENT IN AN ORGANIZED HEALTH CARE EDUCATION/TRAINING PROGRAM

## 2025-01-27 PROCEDURE — G8417 CALC BMI ABV UP PARAM F/U: HCPCS | Performed by: STUDENT IN AN ORGANIZED HEALTH CARE EDUCATION/TRAINING PROGRAM

## 2025-01-27 PROCEDURE — 99213 OFFICE O/P EST LOW 20 MIN: CPT | Performed by: STUDENT IN AN ORGANIZED HEALTH CARE EDUCATION/TRAINING PROGRAM

## 2025-01-27 PROCEDURE — 3017F COLORECTAL CA SCREEN DOC REV: CPT | Performed by: STUDENT IN AN ORGANIZED HEALTH CARE EDUCATION/TRAINING PROGRAM

## 2025-01-27 PROCEDURE — 3075F SYST BP GE 130 - 139MM HG: CPT | Performed by: STUDENT IN AN ORGANIZED HEALTH CARE EDUCATION/TRAINING PROGRAM

## 2025-01-27 PROCEDURE — 1036F TOBACCO NON-USER: CPT | Performed by: STUDENT IN AN ORGANIZED HEALTH CARE EDUCATION/TRAINING PROGRAM

## 2025-01-27 PROCEDURE — 3079F DIAST BP 80-89 MM HG: CPT | Performed by: STUDENT IN AN ORGANIZED HEALTH CARE EDUCATION/TRAINING PROGRAM

## 2025-01-27 ASSESSMENT — SLEEP AND FATIGUE QUESTIONNAIRES
HOW LIKELY ARE YOU TO NOD OFF OR FALL ASLEEP WHILE SITTING INACTIVE IN A PUBLIC PLACE: WOULD NEVER DOZE
HOW LIKELY ARE YOU TO NOD OFF OR FALL ASLEEP WHILE LYING DOWN TO REST IN THE AFTERNOON WHEN CIRCUMSTANCES PERMIT: MODERATE CHANCE OF DOZING
HOW LIKELY ARE YOU TO NOD OFF OR FALL ASLEEP WHILE SITTING AND TALKING TO SOMEONE: WOULD NEVER DOZE
HOW LIKELY ARE YOU TO NOD OFF OR FALL ASLEEP IN A CAR, WHILE STOPPED FOR A FEW MINUTES IN TRAFFIC: WOULD NEVER DOZE
HOW LIKELY ARE YOU TO NOD OFF OR FALL ASLEEP WHILE SITTING AND READING: HIGH CHANCE OF DOZING
HOW LIKELY ARE YOU TO NOD OFF OR FALL ASLEEP WHEN YOU ARE A PASSENGER IN A CAR FOR AN HOUR WITHOUT A BREAK: WOULD NEVER DOZE
ESS TOTAL SCORE: 8
HOW LIKELY ARE YOU TO NOD OFF OR FALL ASLEEP WHILE WATCHING TV: HIGH CHANCE OF DOZING
HOW LIKELY ARE YOU TO NOD OFF OR FALL ASLEEP WHILE SITTING QUIETLY AFTER LUNCH WITHOUT ALCOHOL: WOULD NEVER DOZE

## 2025-01-27 NOTE — PROGRESS NOTES
[facility]  Office visit follow-up  ______________________________________________________________________________    Patient: Yamini Barnhart  YOB: 1965   MRN: [id number]    Acct:      Today's date: 01/27/25    Chief complaint   Follow up     History of present illness     A 59 y.o. female       Pt seen and Chart reviewed.  Yamini Barnhart is here in followup for   1. KIANA (obstructive sleep apnea)    2. Class 3 severe obesity due to excess calories with serious comorbidity and body mass index (BMI) of 50.0 to 59.9 in adult          Interval history: 01/27/25  History of Present Illness  The patient presents for sleep apnea, weight loss, diabetes mellitus.    She reports satisfactory respiratory function and experiences awakenings at night due to generalized body pain, particularly back pain. Despite these interruptions, she feels refreshed upon waking and does not experience morning headaches. She reports no symptoms of shortness of breath or asthma. She reports a need for a new BiPAP machine as her current one, acquired in 2021, has been producing a squealing noise indicative of air leakage. This issue was previously addressed by Dr. Cochran, who provided a replacement order last year. However, her insurance company declined coverage due to the machine being less than 5 years old. The noise from the machine has been progressively worsening over the past year, often disrupting her sleep. She is currently using a BiPAP machine, with which she demonstrates excellent compliance, averaging approximately 8 hours and 50 minutes of use per night.    She has been actively pursuing weight loss and has achieved a total weight loss of 100 pounds. Initially, she was following the Weight Wat[employer] program but has since transitioned to using a home application for weight management. She had considered weight loss surgery but decided against it after observing significant weight loss

## 2025-02-05 ENCOUNTER — PATIENT MESSAGE (OUTPATIENT)
Dept: PRIMARY CARE CLINIC | Age: 60
End: 2025-02-05

## 2025-02-05 DIAGNOSIS — Z79.4 TYPE 2 DIABETES MELLITUS TREATED WITH INSULIN (HCC): ICD-10-CM

## 2025-02-05 DIAGNOSIS — E11.9 TYPE 2 DIABETES MELLITUS WITHOUT COMPLICATION, WITH LONG-TERM CURRENT USE OF INSULIN (HCC): ICD-10-CM

## 2025-02-05 DIAGNOSIS — E11.9 TYPE 2 DIABETES MELLITUS TREATED WITH INSULIN (HCC): ICD-10-CM

## 2025-02-05 DIAGNOSIS — Z79.4 TYPE 2 DIABETES MELLITUS WITHOUT COMPLICATION, WITH LONG-TERM CURRENT USE OF INSULIN (HCC): ICD-10-CM

## 2025-02-06 DIAGNOSIS — Z79.4 TYPE 2 DIABETES MELLITUS WITHOUT COMPLICATION, WITH LONG-TERM CURRENT USE OF INSULIN (HCC): ICD-10-CM

## 2025-02-06 DIAGNOSIS — E11.9 TYPE 2 DIABETES MELLITUS WITHOUT COMPLICATION, WITH LONG-TERM CURRENT USE OF INSULIN (HCC): ICD-10-CM

## 2025-02-06 DIAGNOSIS — Z79.4 TYPE 2 DIABETES MELLITUS TREATED WITH INSULIN (HCC): ICD-10-CM

## 2025-02-06 DIAGNOSIS — E11.9 TYPE 2 DIABETES MELLITUS TREATED WITH INSULIN (HCC): ICD-10-CM

## 2025-03-27 ENCOUNTER — PATIENT MESSAGE (OUTPATIENT)
Dept: PRIMARY CARE CLINIC | Age: 60
End: 2025-03-27

## 2025-03-27 DIAGNOSIS — M54.50 CHRONIC BILATERAL LOW BACK PAIN, UNSPECIFIED WHETHER SCIATICA PRESENT: Primary | ICD-10-CM

## 2025-03-27 DIAGNOSIS — M47.816 ARTHRITIS OF LUMBAR SPINE: ICD-10-CM

## 2025-03-27 DIAGNOSIS — G89.29 CHRONIC BILATERAL LOW BACK PAIN, UNSPECIFIED WHETHER SCIATICA PRESENT: Primary | ICD-10-CM

## 2025-03-27 RX ORDER — TRAMADOL HYDROCHLORIDE 50 MG/1
100 TABLET ORAL EVERY 8 HOURS PRN
Qty: 18 TABLET | Refills: 0 | Status: SHIPPED | OUTPATIENT
Start: 2025-03-27 | End: 2025-03-30

## 2025-04-01 DIAGNOSIS — E11.9 TYPE 2 DIABETES MELLITUS WITHOUT COMPLICATION, WITH LONG-TERM CURRENT USE OF INSULIN: ICD-10-CM

## 2025-04-01 DIAGNOSIS — Z79.4 TYPE 2 DIABETES MELLITUS WITHOUT COMPLICATION, WITH LONG-TERM CURRENT USE OF INSULIN: ICD-10-CM

## 2025-04-01 RX ORDER — PEN NEEDLE, DIABETIC 31 GX5/16"
NEEDLE, DISPOSABLE MISCELLANEOUS
Qty: 200 EACH | Refills: 3 | Status: SHIPPED | OUTPATIENT
Start: 2025-04-01

## 2025-04-04 ENCOUNTER — PATIENT MESSAGE (OUTPATIENT)
Dept: PRIMARY CARE CLINIC | Age: 60
End: 2025-04-04

## 2025-04-04 DIAGNOSIS — Z79.4 TYPE 2 DIABETES MELLITUS WITHOUT COMPLICATION, WITH LONG-TERM CURRENT USE OF INSULIN: ICD-10-CM

## 2025-04-04 DIAGNOSIS — E11.9 TYPE 2 DIABETES MELLITUS TREATED WITH INSULIN (HCC): ICD-10-CM

## 2025-04-04 DIAGNOSIS — E11.9 TYPE 2 DIABETES MELLITUS WITHOUT COMPLICATION, WITH LONG-TERM CURRENT USE OF INSULIN: ICD-10-CM

## 2025-04-04 DIAGNOSIS — E66.01 TYPE 2 DIABETES MELLITUS WITH MORBID OBESITY: ICD-10-CM

## 2025-04-04 DIAGNOSIS — Z79.4 TYPE 2 DIABETES MELLITUS TREATED WITH INSULIN (HCC): ICD-10-CM

## 2025-04-04 DIAGNOSIS — E11.69 TYPE 2 DIABETES MELLITUS WITH MORBID OBESITY: ICD-10-CM

## 2025-04-04 RX ORDER — SEMAGLUTIDE 2.68 MG/ML
2 INJECTION, SOLUTION SUBCUTANEOUS
Qty: 9 ML | Refills: 0 | Status: SHIPPED | OUTPATIENT
Start: 2025-04-04

## 2025-04-04 RX ORDER — METOPROLOL TARTRATE 25 MG/1
25 TABLET, FILM COATED ORAL 2 TIMES DAILY
Qty: 180 TABLET | Refills: 3 | Status: SHIPPED | OUTPATIENT
Start: 2025-04-04

## 2025-04-06 SDOH — ECONOMIC STABILITY: FOOD INSECURITY: WITHIN THE PAST 12 MONTHS, THE FOOD YOU BOUGHT JUST DIDN'T LAST AND YOU DIDN'T HAVE MONEY TO GET MORE.: NEVER TRUE

## 2025-04-06 SDOH — ECONOMIC STABILITY: INCOME INSECURITY: IN THE LAST 12 MONTHS, WAS THERE A TIME WHEN YOU WERE NOT ABLE TO PAY THE MORTGAGE OR RENT ON TIME?: NO

## 2025-04-06 SDOH — ECONOMIC STABILITY: FOOD INSECURITY: WITHIN THE PAST 12 MONTHS, YOU WORRIED THAT YOUR FOOD WOULD RUN OUT BEFORE YOU GOT MONEY TO BUY MORE.: NEVER TRUE

## 2025-04-06 ASSESSMENT — PATIENT HEALTH QUESTIONNAIRE - PHQ9
SUM OF ALL RESPONSES TO PHQ QUESTIONS 1-9: 0
SUM OF ALL RESPONSES TO PHQ QUESTIONS 1-9: 0
2. FEELING DOWN, DEPRESSED OR HOPELESS: NOT AT ALL
1. LITTLE INTEREST OR PLEASURE IN DOING THINGS: NOT AT ALL
2. FEELING DOWN, DEPRESSED OR HOPELESS: NOT AT ALL
SUM OF ALL RESPONSES TO PHQ9 QUESTIONS 1 & 2: 0
SUM OF ALL RESPONSES TO PHQ QUESTIONS 1-9: 0
1. LITTLE INTEREST OR PLEASURE IN DOING THINGS: NOT AT ALL
SUM OF ALL RESPONSES TO PHQ QUESTIONS 1-9: 0

## 2025-04-09 ENCOUNTER — OFFICE VISIT (OUTPATIENT)
Dept: PRIMARY CARE CLINIC | Age: 60
End: 2025-04-09
Payer: COMMERCIAL

## 2025-04-09 VITALS
WEIGHT: 293 LBS | OXYGEN SATURATION: 99 % | DIASTOLIC BLOOD PRESSURE: 70 MMHG | HEART RATE: 92 BPM | SYSTOLIC BLOOD PRESSURE: 106 MMHG | HEIGHT: 62 IN | BODY MASS INDEX: 53.92 KG/M2

## 2025-04-09 DIAGNOSIS — Z79.4 TYPE 2 DIABETES MELLITUS TREATED WITH INSULIN (HCC): Primary | ICD-10-CM

## 2025-04-09 DIAGNOSIS — G89.29 CHRONIC BILATERAL LOW BACK PAIN, UNSPECIFIED WHETHER SCIATICA PRESENT: ICD-10-CM

## 2025-04-09 DIAGNOSIS — M54.50 CHRONIC BILATERAL LOW BACK PAIN, UNSPECIFIED WHETHER SCIATICA PRESENT: ICD-10-CM

## 2025-04-09 DIAGNOSIS — K59.03 DRUG-INDUCED CONSTIPATION: ICD-10-CM

## 2025-04-09 DIAGNOSIS — Z12.31 BREAST CANCER SCREENING BY MAMMOGRAM: ICD-10-CM

## 2025-04-09 DIAGNOSIS — E11.9 TYPE 2 DIABETES MELLITUS TREATED WITH INSULIN (HCC): Primary | ICD-10-CM

## 2025-04-09 LAB — HBA1C MFR BLD: 6.4 %

## 2025-04-09 PROCEDURE — 2022F DILAT RTA XM EVC RTNOPTHY: CPT | Performed by: FAMILY MEDICINE

## 2025-04-09 PROCEDURE — 3017F COLORECTAL CA SCREEN DOC REV: CPT | Performed by: FAMILY MEDICINE

## 2025-04-09 PROCEDURE — G8417 CALC BMI ABV UP PARAM F/U: HCPCS | Performed by: FAMILY MEDICINE

## 2025-04-09 PROCEDURE — G8427 DOCREV CUR MEDS BY ELIG CLIN: HCPCS | Performed by: FAMILY MEDICINE

## 2025-04-09 PROCEDURE — 3044F HG A1C LEVEL LT 7.0%: CPT | Performed by: FAMILY MEDICINE

## 2025-04-09 PROCEDURE — 83036 HEMOGLOBIN GLYCOSYLATED A1C: CPT | Performed by: FAMILY MEDICINE

## 2025-04-09 PROCEDURE — 3074F SYST BP LT 130 MM HG: CPT | Performed by: FAMILY MEDICINE

## 2025-04-09 PROCEDURE — 99214 OFFICE O/P EST MOD 30 MIN: CPT | Performed by: FAMILY MEDICINE

## 2025-04-09 PROCEDURE — 3078F DIAST BP <80 MM HG: CPT | Performed by: FAMILY MEDICINE

## 2025-04-09 PROCEDURE — 1036F TOBACCO NON-USER: CPT | Performed by: FAMILY MEDICINE

## 2025-04-09 RX ORDER — DOCUSATE SODIUM 100 MG/1
100 CAPSULE, LIQUID FILLED ORAL DAILY PRN
COMMUNITY
Start: 2025-04-09

## 2025-04-09 ASSESSMENT — ENCOUNTER SYMPTOMS: BACK PAIN: 1

## 2025-04-09 NOTE — PROGRESS NOTES
[facility]  [facility]  [address]  [place] [address]  Dept: [phone]    Yamini Barnhart is a 59 y.o. female Established patient, who presents today for her medical conditions/complaintsas noted below.      Chief Complaint   Patient presents with    Diabetes     Pt is here for a 3 Month f/u. Last A1c was 6.5.    Hypertension       HPI:     HPI    Reviewed prior notes Pulmonary  Reviewed previous Labs and Imaging  Down 13 #  Threw her back out again  Using her walker  Sugars have gone up to 300 at times in April  In Feb  she had low sugars and stopped insulin and metformin. She restarted some insulin last week when sugars were high off the ozempic.    Reviewed her home sugar log on her phone, checking sugars 3- 5  a day.     Constipation issues with the injection meds, worse with ozempic.     Uses Bipap nightly  No components found for: \"LDLCHOLESTEROL\", \"LDLCALC\"    (goal LDL is <100)   AST (U/L)   Date Value   2024 28     ALT (U/L)   Date Value   2024 24     BUN (mg/dL)   Date Value   2024 25 (H)     Hemoglobin A1C (%)   Date Value   2024 6.5     TSH (mIU/L)   Date Value   2022 0.54     BP Readings from Last 3 Encounters:   25 106/70   25 130/83   24 122/74          (goal 120/80)    Past Medical History:   Diagnosis Date    Acute left-sided low back pain with left-sided sciatica     Arthritis     BMI 70 and over, adult     Cancer (HCC)     endometrial    Diabetes mellitus (HCC)     High blood pressure     Hypertension     Obesity     Osteoarthritis     Sleep apnea     Type 2 diabetes mellitus without complication       Past Surgical History:   Procedure Laterality Date     SECTION      HYSTERECTOMY, TOTAL ABDOMINAL (CERVIX REMOVED)      HYSTERECTOMY, VAGINAL      OTHER SURGICAL HISTORY      [id number] multiple procedures to remove cancer    TONSILLECTOMY         Family History   Problem Relation Age of

## 2025-04-10 DIAGNOSIS — E11.9 TYPE 2 DIABETES MELLITUS WITHOUT COMPLICATION, WITH LONG-TERM CURRENT USE OF INSULIN: ICD-10-CM

## 2025-04-10 DIAGNOSIS — E11.69 TYPE 2 DIABETES MELLITUS WITH MORBID OBESITY: ICD-10-CM

## 2025-04-10 DIAGNOSIS — E11.9 TYPE 2 DIABETES MELLITUS TREATED WITH INSULIN (HCC): ICD-10-CM

## 2025-04-10 DIAGNOSIS — E66.01 TYPE 2 DIABETES MELLITUS WITH MORBID OBESITY: ICD-10-CM

## 2025-04-10 DIAGNOSIS — Z79.4 TYPE 2 DIABETES MELLITUS TREATED WITH INSULIN (HCC): ICD-10-CM

## 2025-04-10 DIAGNOSIS — Z79.4 TYPE 2 DIABETES MELLITUS WITHOUT COMPLICATION, WITH LONG-TERM CURRENT USE OF INSULIN: ICD-10-CM

## 2025-04-10 RX ORDER — SEMAGLUTIDE 2.68 MG/ML
2 INJECTION, SOLUTION SUBCUTANEOUS
Qty: 9 ML | Refills: 2 | Status: SHIPPED | OUTPATIENT
Start: 2025-04-10

## 2025-05-15 ENCOUNTER — PATIENT MESSAGE (OUTPATIENT)
Dept: PRIMARY CARE CLINIC | Age: 60
End: 2025-05-15

## 2025-05-15 RX ORDER — HYDROCHLOROTHIAZIDE 12.5 MG/1
CAPSULE ORAL
OUTPATIENT
Start: 2025-05-15

## 2025-05-15 RX ORDER — KETOROLAC TROMETHAMINE 30 MG/ML
INJECTION, SOLUTION INTRAMUSCULAR; INTRAVENOUS
OUTPATIENT
Start: 2025-05-15

## 2025-05-16 RX ORDER — ACYCLOVIR 800 MG/1
1 TABLET ORAL
Qty: 6 EACH | Refills: 3 | Status: SHIPPED | OUTPATIENT
Start: 2025-05-16

## 2025-05-16 RX ORDER — KETOROLAC TROMETHAMINE 30 MG/ML
1 INJECTION, SOLUTION INTRAMUSCULAR; INTRAVENOUS DAILY
Qty: 1 EACH | Refills: 0 | Status: SHIPPED | OUTPATIENT
Start: 2025-05-16

## 2025-06-12 DIAGNOSIS — E11.9 TYPE 2 DIABETES MELLITUS WITHOUT COMPLICATION, WITH LONG-TERM CURRENT USE OF INSULIN (HCC): ICD-10-CM

## 2025-06-12 DIAGNOSIS — Z79.4 TYPE 2 DIABETES MELLITUS TREATED WITH INSULIN (HCC): ICD-10-CM

## 2025-06-12 DIAGNOSIS — Z79.4 TYPE 2 DIABETES MELLITUS WITHOUT COMPLICATION, WITH LONG-TERM CURRENT USE OF INSULIN (HCC): ICD-10-CM

## 2025-06-12 DIAGNOSIS — E66.01 TYPE 2 DIABETES MELLITUS WITH MORBID OBESITY (HCC): ICD-10-CM

## 2025-06-12 DIAGNOSIS — E11.69 TYPE 2 DIABETES MELLITUS WITH MORBID OBESITY (HCC): ICD-10-CM

## 2025-06-12 DIAGNOSIS — E11.9 TYPE 2 DIABETES MELLITUS TREATED WITH INSULIN (HCC): ICD-10-CM

## 2025-06-12 RX ORDER — SEMAGLUTIDE 2.68 MG/ML
2 INJECTION, SOLUTION SUBCUTANEOUS
Qty: 9 ML | Refills: 2 | Status: SHIPPED | OUTPATIENT
Start: 2025-06-12

## 2025-07-26 DIAGNOSIS — J01.90 SUBACUTE SINUSITIS, UNSPECIFIED LOCATION: ICD-10-CM

## 2025-07-26 DIAGNOSIS — R09.81 NASAL CONGESTION: ICD-10-CM

## 2025-07-28 RX ORDER — FLUTICASONE PROPIONATE 50 MCG
2 SPRAY, SUSPENSION (ML) NASAL DAILY
Qty: 32 G | Refills: 1 | Status: SHIPPED | OUTPATIENT
Start: 2025-07-28

## 2025-08-08 ENCOUNTER — OFFICE VISIT (OUTPATIENT)
Dept: PRIMARY CARE CLINIC | Age: 60
End: 2025-08-08
Payer: COMMERCIAL

## 2025-08-08 VITALS
DIASTOLIC BLOOD PRESSURE: 76 MMHG | HEART RATE: 98 BPM | OXYGEN SATURATION: 98 % | SYSTOLIC BLOOD PRESSURE: 138 MMHG | WEIGHT: 293 LBS | BODY MASS INDEX: 61.37 KG/M2

## 2025-08-08 DIAGNOSIS — I10 ESSENTIAL HYPERTENSION: ICD-10-CM

## 2025-08-08 DIAGNOSIS — E66.01 MORBID OBESITY WITH BMI OF 60.0-69.9, ADULT (HCC): ICD-10-CM

## 2025-08-08 DIAGNOSIS — Z79.4 TYPE 2 DIABETES MELLITUS WITHOUT COMPLICATION, WITH LONG-TERM CURRENT USE OF INSULIN (HCC): ICD-10-CM

## 2025-08-08 DIAGNOSIS — Z76.89 ENCOUNTER TO ESTABLISH CARE WITH NEW DOCTOR: Primary | ICD-10-CM

## 2025-08-08 DIAGNOSIS — E11.9 TYPE 2 DIABETES MELLITUS WITHOUT COMPLICATION, WITH LONG-TERM CURRENT USE OF INSULIN (HCC): ICD-10-CM

## 2025-08-08 DIAGNOSIS — Z12.11 SCREENING FOR MALIGNANT NEOPLASM OF COLON: ICD-10-CM

## 2025-08-08 LAB — HBA1C MFR BLD: 6.3 %

## 2025-08-08 PROCEDURE — G8427 DOCREV CUR MEDS BY ELIG CLIN: HCPCS | Performed by: STUDENT IN AN ORGANIZED HEALTH CARE EDUCATION/TRAINING PROGRAM

## 2025-08-08 PROCEDURE — 3075F SYST BP GE 130 - 139MM HG: CPT | Performed by: STUDENT IN AN ORGANIZED HEALTH CARE EDUCATION/TRAINING PROGRAM

## 2025-08-08 PROCEDURE — 1036F TOBACCO NON-USER: CPT | Performed by: STUDENT IN AN ORGANIZED HEALTH CARE EDUCATION/TRAINING PROGRAM

## 2025-08-08 PROCEDURE — 83036 HEMOGLOBIN GLYCOSYLATED A1C: CPT | Performed by: STUDENT IN AN ORGANIZED HEALTH CARE EDUCATION/TRAINING PROGRAM

## 2025-08-08 PROCEDURE — 3017F COLORECTAL CA SCREEN DOC REV: CPT | Performed by: STUDENT IN AN ORGANIZED HEALTH CARE EDUCATION/TRAINING PROGRAM

## 2025-08-08 PROCEDURE — 3078F DIAST BP <80 MM HG: CPT | Performed by: STUDENT IN AN ORGANIZED HEALTH CARE EDUCATION/TRAINING PROGRAM

## 2025-08-08 PROCEDURE — G8417 CALC BMI ABV UP PARAM F/U: HCPCS | Performed by: STUDENT IN AN ORGANIZED HEALTH CARE EDUCATION/TRAINING PROGRAM

## 2025-08-08 PROCEDURE — 3044F HG A1C LEVEL LT 7.0%: CPT | Performed by: STUDENT IN AN ORGANIZED HEALTH CARE EDUCATION/TRAINING PROGRAM

## 2025-08-08 PROCEDURE — 2022F DILAT RTA XM EVC RTNOPTHY: CPT | Performed by: STUDENT IN AN ORGANIZED HEALTH CARE EDUCATION/TRAINING PROGRAM

## 2025-08-08 PROCEDURE — 99214 OFFICE O/P EST MOD 30 MIN: CPT | Performed by: STUDENT IN AN ORGANIZED HEALTH CARE EDUCATION/TRAINING PROGRAM

## 2025-08-08 RX ORDER — SEMAGLUTIDE 2.68 MG/ML
2 INJECTION, SOLUTION SUBCUTANEOUS
Qty: 9 ML | Refills: 2 | Status: SHIPPED | OUTPATIENT
Start: 2025-08-08

## 2025-08-08 RX ORDER — LISINOPRIL 10 MG/1
10 TABLET ORAL DAILY
Qty: 90 TABLET | Refills: 1 | Status: SHIPPED | OUTPATIENT
Start: 2025-08-08

## 2025-08-08 RX ORDER — TRIAMTERENE AND HYDROCHLOROTHIAZIDE 75; 50 MG/1; MG/1
TABLET ORAL
Qty: 90 TABLET | Refills: 1 | Status: SHIPPED | OUTPATIENT
Start: 2025-08-08

## 2025-08-08 RX ORDER — HYDROCHLOROTHIAZIDE 12.5 MG/1
CAPSULE ORAL
Qty: 6 EACH | Refills: 1 | Status: SHIPPED | OUTPATIENT
Start: 2025-08-08

## 2025-08-08 RX ORDER — METOPROLOL TARTRATE 25 MG/1
25 TABLET, FILM COATED ORAL 2 TIMES DAILY
Qty: 180 TABLET | Refills: 1 | Status: SHIPPED | OUTPATIENT
Start: 2025-08-08

## 2025-08-08 ASSESSMENT — ENCOUNTER SYMPTOMS
NAUSEA: 0
ABDOMINAL PAIN: 0
SHORTNESS OF BREATH: 0
VOMITING: 0